# Patient Record
Sex: MALE | Race: WHITE | NOT HISPANIC OR LATINO | Employment: OTHER | URBAN - NONMETROPOLITAN AREA
[De-identification: names, ages, dates, MRNs, and addresses within clinical notes are randomized per-mention and may not be internally consistent; named-entity substitution may affect disease eponyms.]

---

## 2020-06-27 ENCOUNTER — APPOINTMENT (EMERGENCY)
Dept: RADIOLOGY | Facility: HOSPITAL | Age: 80
DRG: 391 | End: 2020-06-27
Payer: MEDICARE

## 2020-06-27 ENCOUNTER — HOSPITAL ENCOUNTER (INPATIENT)
Facility: HOSPITAL | Age: 80
LOS: 12 days | Discharge: NURSING, REHAB OR LONG-TERM CARE | DRG: 391 | End: 2020-07-09
Attending: EMERGENCY MEDICINE | Admitting: FAMILY MEDICINE
Payer: MEDICARE

## 2020-06-27 DIAGNOSIS — E86.0 DEHYDRATION: Primary | ICD-10-CM

## 2020-06-27 DIAGNOSIS — T14.8XXA DEEP TISSUE INJURY: ICD-10-CM

## 2020-06-27 DIAGNOSIS — I42.9 CARDIOMYOPATHY, UNSPECIFIED TYPE (HCC): ICD-10-CM

## 2020-06-27 DIAGNOSIS — L89.301 PRESSURE INJURY OF BUTTOCK, STAGE 1, UNSPECIFIED LATERALITY: ICD-10-CM

## 2020-06-27 DIAGNOSIS — K59.00 CONSTIPATION, UNSPECIFIED CONSTIPATION TYPE: ICD-10-CM

## 2020-06-27 DIAGNOSIS — R62.7 FAILURE TO THRIVE IN ADULT: ICD-10-CM

## 2020-06-27 DIAGNOSIS — R45.851 SUICIDAL IDEATION: ICD-10-CM

## 2020-06-27 DIAGNOSIS — R63.0 ANOREXIA: ICD-10-CM

## 2020-06-27 DIAGNOSIS — E53.8 VITAMIN B12 DEFICIENCY: ICD-10-CM

## 2020-06-27 DIAGNOSIS — F41.9 ANXIETY: ICD-10-CM

## 2020-06-27 DIAGNOSIS — R13.12 OROPHARYNGEAL DYSPHAGIA: ICD-10-CM

## 2020-06-27 DIAGNOSIS — E43 PROTEIN-CALORIE MALNUTRITION, SEVERE (HCC): ICD-10-CM

## 2020-06-27 DIAGNOSIS — I51.3 LEFT VENTRICULAR APICAL THROMBUS: ICD-10-CM

## 2020-06-27 DIAGNOSIS — I48.21 PERMANENT ATRIAL FIBRILLATION (HCC): ICD-10-CM

## 2020-06-27 PROBLEM — R53.1 GENERALIZED WEAKNESS: Status: ACTIVE | Noted: 2020-06-27

## 2020-06-27 PROBLEM — F31.9 BIPOLAR 1 DISORDER, DEPRESSED (HCC): Status: ACTIVE | Noted: 2020-06-27

## 2020-06-27 PROBLEM — R53.81 PHYSICAL DECONDITIONING: Status: ACTIVE | Noted: 2020-06-27

## 2020-06-27 PROBLEM — R33.9 URINARY RETENTION: Status: ACTIVE | Noted: 2020-06-27

## 2020-06-27 PROBLEM — E11.40 TYPE 2 DIABETES MELLITUS WITH DIABETIC NEUROPATHY, WITHOUT LONG-TERM CURRENT USE OF INSULIN (HCC): Status: ACTIVE | Noted: 2020-06-27

## 2020-06-27 LAB
ALBUMIN SERPL BCP-MCNC: 3.3 G/DL (ref 3.5–5)
ALP SERPL-CCNC: 72 U/L (ref 46–116)
ALT SERPL W P-5'-P-CCNC: 14 U/L (ref 12–78)
ANION GAP SERPL CALCULATED.3IONS-SCNC: 2 MMOL/L (ref 4–13)
AST SERPL W P-5'-P-CCNC: 18 U/L (ref 5–45)
BASOPHILS # BLD AUTO: 0.01 THOUSANDS/ΜL (ref 0–0.1)
BASOPHILS NFR BLD AUTO: 0 % (ref 0–1)
BILIRUB SERPL-MCNC: 0.3 MG/DL (ref 0.2–1)
BILIRUB UR QL STRIP: NEGATIVE
BUN SERPL-MCNC: 22 MG/DL (ref 5–25)
CALCIUM SERPL-MCNC: 8.5 MG/DL (ref 8.3–10.1)
CHLORIDE SERPL-SCNC: 111 MMOL/L (ref 100–108)
CK SERPL-CCNC: 33 U/L (ref 39–308)
CLARITY UR: CLEAR
CO2 SERPL-SCNC: 31 MMOL/L (ref 21–32)
COLOR UR: YELLOW
CREAT SERPL-MCNC: 1.08 MG/DL (ref 0.6–1.3)
EOSINOPHIL # BLD AUTO: 0.02 THOUSAND/ΜL (ref 0–0.61)
EOSINOPHIL NFR BLD AUTO: 1 % (ref 0–6)
ERYTHROCYTE [DISTWIDTH] IN BLOOD BY AUTOMATED COUNT: 15.9 % (ref 11.6–15.1)
GFR SERPL CREATININE-BSD FRML MDRD: 65 ML/MIN/1.73SQ M
GLUCOSE SERPL-MCNC: 106 MG/DL (ref 65–140)
GLUCOSE SERPL-MCNC: 96 MG/DL (ref 65–140)
GLUCOSE UR STRIP-MCNC: NEGATIVE MG/DL
HCT VFR BLD AUTO: 37.3 % (ref 36.5–49.3)
HGB BLD-MCNC: 12.8 G/DL (ref 12–17)
HGB UR QL STRIP.AUTO: NEGATIVE
IMM GRANULOCYTES # BLD AUTO: 0.01 THOUSAND/UL (ref 0–0.2)
IMM GRANULOCYTES NFR BLD AUTO: 0 % (ref 0–2)
INR PPP: 1.84 (ref 0.84–1.19)
KETONES UR STRIP-MCNC: NEGATIVE MG/DL
LEUKOCYTE ESTERASE UR QL STRIP: NEGATIVE
LIPASE SERPL-CCNC: 128 U/L (ref 73–393)
LYMPHOCYTES # BLD AUTO: 0.89 THOUSANDS/ΜL (ref 0.6–4.47)
LYMPHOCYTES NFR BLD AUTO: 26 % (ref 14–44)
MAGNESIUM SERPL-MCNC: 1.9 MG/DL (ref 1.6–2.6)
MCH RBC QN AUTO: 32.3 PG (ref 26.8–34.3)
MCHC RBC AUTO-ENTMCNC: 34.3 G/DL (ref 31.4–37.4)
MCV RBC AUTO: 94 FL (ref 82–98)
MONOCYTES # BLD AUTO: 0.62 THOUSAND/ΜL (ref 0.17–1.22)
MONOCYTES NFR BLD AUTO: 18 % (ref 4–12)
NEUTROPHILS # BLD AUTO: 1.84 THOUSANDS/ΜL (ref 1.85–7.62)
NEUTS SEG NFR BLD AUTO: 55 % (ref 43–75)
NITRITE UR QL STRIP: NEGATIVE
NRBC BLD AUTO-RTO: 0 /100 WBCS
PH UR STRIP.AUTO: 7.5 [PH]
PLATELET # BLD AUTO: 111 THOUSANDS/UL (ref 149–390)
PMV BLD AUTO: 11.2 FL (ref 8.9–12.7)
POTASSIUM SERPL-SCNC: 4.9 MMOL/L (ref 3.5–5.3)
PROT SERPL-MCNC: 6.1 G/DL (ref 6.4–8.2)
PROT UR STRIP-MCNC: NEGATIVE MG/DL
PROTHROMBIN TIME: 21.4 SECONDS (ref 11.6–14.5)
RBC # BLD AUTO: 3.96 MILLION/UL (ref 3.88–5.62)
SODIUM SERPL-SCNC: 144 MMOL/L (ref 136–145)
SP GR UR STRIP.AUTO: 1.01 (ref 1–1.03)
TROPONIN I SERPL-MCNC: 0.04 NG/ML
TSH SERPL DL<=0.05 MIU/L-ACNC: 0.55 UIU/ML (ref 0.36–3.74)
UROBILINOGEN UR QL STRIP.AUTO: 0.2 E.U./DL
WBC # BLD AUTO: 3.39 THOUSAND/UL (ref 4.31–10.16)

## 2020-06-27 PROCEDURE — 94760 N-INVAS EAR/PLS OXIMETRY 1: CPT

## 2020-06-27 PROCEDURE — 82550 ASSAY OF CK (CPK): CPT | Performed by: EMERGENCY MEDICINE

## 2020-06-27 PROCEDURE — 36415 COLL VENOUS BLD VENIPUNCTURE: CPT | Performed by: EMERGENCY MEDICINE

## 2020-06-27 PROCEDURE — 96361 HYDRATE IV INFUSION ADD-ON: CPT

## 2020-06-27 PROCEDURE — 84443 ASSAY THYROID STIM HORMONE: CPT | Performed by: EMERGENCY MEDICINE

## 2020-06-27 PROCEDURE — 85610 PROTHROMBIN TIME: CPT | Performed by: EMERGENCY MEDICINE

## 2020-06-27 PROCEDURE — 83735 ASSAY OF MAGNESIUM: CPT | Performed by: EMERGENCY MEDICINE

## 2020-06-27 PROCEDURE — 93005 ELECTROCARDIOGRAM TRACING: CPT

## 2020-06-27 PROCEDURE — 84484 ASSAY OF TROPONIN QUANT: CPT | Performed by: EMERGENCY MEDICINE

## 2020-06-27 PROCEDURE — 82948 REAGENT STRIP/BLOOD GLUCOSE: CPT

## 2020-06-27 PROCEDURE — 80053 COMPREHEN METABOLIC PANEL: CPT | Performed by: EMERGENCY MEDICINE

## 2020-06-27 PROCEDURE — 99285 EMERGENCY DEPT VISIT HI MDM: CPT

## 2020-06-27 PROCEDURE — 99223 1ST HOSP IP/OBS HIGH 75: CPT | Performed by: FAMILY MEDICINE

## 2020-06-27 PROCEDURE — 96360 HYDRATION IV INFUSION INIT: CPT

## 2020-06-27 PROCEDURE — 71045 X-RAY EXAM CHEST 1 VIEW: CPT

## 2020-06-27 PROCEDURE — 83690 ASSAY OF LIPASE: CPT | Performed by: EMERGENCY MEDICINE

## 2020-06-27 PROCEDURE — 99285 EMERGENCY DEPT VISIT HI MDM: CPT | Performed by: EMERGENCY MEDICINE

## 2020-06-27 PROCEDURE — 85025 COMPLETE CBC W/AUTO DIFF WBC: CPT | Performed by: EMERGENCY MEDICINE

## 2020-06-27 PROCEDURE — 87081 CULTURE SCREEN ONLY: CPT | Performed by: FAMILY MEDICINE

## 2020-06-27 PROCEDURE — 81003 URINALYSIS AUTO W/O SCOPE: CPT | Performed by: EMERGENCY MEDICINE

## 2020-06-27 RX ORDER — MAGNESIUM HYDROXIDE/ALUMINUM HYDROXICE/SIMETHICONE 120; 1200; 1200 MG/30ML; MG/30ML; MG/30ML
30 SUSPENSION ORAL EVERY 6 HOURS PRN
Status: DISCONTINUED | OUTPATIENT
Start: 2020-06-27 | End: 2020-07-09 | Stop reason: HOSPADM

## 2020-06-27 RX ORDER — POTASSIUM CHLORIDE 750 MG/1
10 CAPSULE, EXTENDED RELEASE ORAL 2 TIMES DAILY
COMMUNITY
End: 2020-09-25 | Stop reason: HOSPADM

## 2020-06-27 RX ORDER — MIRTAZAPINE 15 MG/1
15 TABLET, FILM COATED ORAL
Status: DISCONTINUED | OUTPATIENT
Start: 2020-06-27 | End: 2020-07-08

## 2020-06-27 RX ORDER — METOPROLOL TARTRATE 50 MG/1
50 TABLET, FILM COATED ORAL EVERY MORNING
COMMUNITY
End: 2020-07-09 | Stop reason: HOSPADM

## 2020-06-27 RX ORDER — ERGOCALCIFEROL 1.25 MG/1
50000 CAPSULE ORAL WEEKLY
Status: DISCONTINUED | OUTPATIENT
Start: 2020-06-27 | End: 2020-07-09 | Stop reason: HOSPADM

## 2020-06-27 RX ORDER — ROPINIROLE 0.25 MG/1
0.5 TABLET, FILM COATED ORAL
Status: DISCONTINUED | OUTPATIENT
Start: 2020-06-27 | End: 2020-07-09 | Stop reason: HOSPADM

## 2020-06-27 RX ORDER — MIRTAZAPINE 15 MG/1
15 TABLET, FILM COATED ORAL
COMMUNITY
End: 2020-07-09 | Stop reason: HOSPADM

## 2020-06-27 RX ORDER — SODIUM CHLORIDE 9 MG/ML
3 INJECTION INTRAVENOUS
Status: DISCONTINUED | OUTPATIENT
Start: 2020-06-27 | End: 2020-07-09 | Stop reason: HOSPADM

## 2020-06-27 RX ORDER — SERTRALINE HYDROCHLORIDE 25 MG/1
25 TABLET, FILM COATED ORAL EVERY MORNING
Status: DISCONTINUED | OUTPATIENT
Start: 2020-06-28 | End: 2020-07-09 | Stop reason: HOSPADM

## 2020-06-27 RX ORDER — ATORVASTATIN CALCIUM 10 MG/1
10 TABLET, FILM COATED ORAL DAILY
COMMUNITY
End: 2020-09-25 | Stop reason: HOSPADM

## 2020-06-27 RX ORDER — QUETIAPINE FUMARATE 50 MG/1
50 TABLET, FILM COATED ORAL
COMMUNITY
End: 2020-09-25 | Stop reason: HOSPADM

## 2020-06-27 RX ORDER — ACETAMINOPHEN 325 MG/1
650 TABLET ORAL EVERY 6 HOURS PRN
COMMUNITY
End: 2020-09-25 | Stop reason: HOSPADM

## 2020-06-27 RX ORDER — SUCRALFATE 1 G/1
1 TABLET ORAL 2 TIMES DAILY
Status: DISCONTINUED | OUTPATIENT
Start: 2020-06-27 | End: 2020-07-09 | Stop reason: HOSPADM

## 2020-06-27 RX ORDER — POTASSIUM CHLORIDE 20MEQ/15ML
20 LIQUID (ML) ORAL DAILY
Status: DISCONTINUED | OUTPATIENT
Start: 2020-06-28 | End: 2020-07-09 | Stop reason: HOSPADM

## 2020-06-27 RX ORDER — ROPINIROLE 0.5 MG/1
0.5 TABLET, FILM COATED ORAL
COMMUNITY
End: 2020-09-25 | Stop reason: HOSPADM

## 2020-06-27 RX ORDER — SUCRALFATE 1 G/1
1 TABLET ORAL 2 TIMES DAILY
COMMUNITY
End: 2020-09-25 | Stop reason: HOSPADM

## 2020-06-27 RX ORDER — ACETAMINOPHEN 325 MG/1
650 TABLET ORAL EVERY 6 HOURS PRN
Status: DISCONTINUED | OUTPATIENT
Start: 2020-06-27 | End: 2020-07-09 | Stop reason: HOSPADM

## 2020-06-27 RX ORDER — LEVOTHYROXINE SODIUM 0.05 MG/1
50 TABLET ORAL DAILY
COMMUNITY
End: 2020-09-25 | Stop reason: HOSPADM

## 2020-06-27 RX ORDER — WARFARIN SODIUM 4 MG/1
4 TABLET ORAL
Status: DISCONTINUED | OUTPATIENT
Start: 2020-06-27 | End: 2020-07-02

## 2020-06-27 RX ORDER — QUETIAPINE FUMARATE 25 MG/1
50 TABLET, FILM COATED ORAL
Status: DISCONTINUED | OUTPATIENT
Start: 2020-06-27 | End: 2020-07-09 | Stop reason: HOSPADM

## 2020-06-27 RX ORDER — NITROGLYCERIN 0.4 MG/1
0.4 TABLET SUBLINGUAL
COMMUNITY
End: 2020-09-25 | Stop reason: HOSPADM

## 2020-06-27 RX ORDER — RAMIPRIL 10 MG/1
10 CAPSULE ORAL DAILY
COMMUNITY
End: 2020-09-25 | Stop reason: HOSPADM

## 2020-06-27 RX ORDER — GABAPENTIN 100 MG/1
100 CAPSULE ORAL
COMMUNITY
End: 2020-09-25 | Stop reason: HOSPADM

## 2020-06-27 RX ORDER — PANTOPRAZOLE SODIUM 40 MG/1
40 TABLET, DELAYED RELEASE ORAL DAILY
COMMUNITY
End: 2020-09-25 | Stop reason: HOSPADM

## 2020-06-27 RX ORDER — DOCUSATE SODIUM 100 MG/1
100 CAPSULE, LIQUID FILLED ORAL 2 TIMES DAILY
Status: DISCONTINUED | OUTPATIENT
Start: 2020-06-27 | End: 2020-07-09 | Stop reason: HOSPADM

## 2020-06-27 RX ORDER — GABAPENTIN 100 MG/1
100 CAPSULE ORAL
Status: DISCONTINUED | OUTPATIENT
Start: 2020-06-27 | End: 2020-07-09 | Stop reason: HOSPADM

## 2020-06-27 RX ORDER — TAMSULOSIN HYDROCHLORIDE 0.4 MG/1
0.4 CAPSULE ORAL
Status: DISCONTINUED | OUTPATIENT
Start: 2020-06-27 | End: 2020-07-09 | Stop reason: HOSPADM

## 2020-06-27 RX ORDER — PANTOPRAZOLE SODIUM 40 MG/1
40 TABLET, DELAYED RELEASE ORAL DAILY
Status: DISCONTINUED | OUTPATIENT
Start: 2020-06-28 | End: 2020-07-09 | Stop reason: HOSPADM

## 2020-06-27 RX ORDER — ATORVASTATIN CALCIUM 10 MG/1
10 TABLET, FILM COATED ORAL DAILY
Status: DISCONTINUED | OUTPATIENT
Start: 2020-06-28 | End: 2020-07-09 | Stop reason: HOSPADM

## 2020-06-27 RX ORDER — ALPRAZOLAM 0.25 MG/1
TABLET ORAL 2 TIMES DAILY PRN
COMMUNITY
End: 2020-07-09 | Stop reason: HOSPADM

## 2020-06-27 RX ORDER — ALPRAZOLAM 0.25 MG/1
0.25 TABLET ORAL 2 TIMES DAILY PRN
Status: DISCONTINUED | OUTPATIENT
Start: 2020-06-27 | End: 2020-07-01

## 2020-06-27 RX ORDER — ERGOCALCIFEROL 1.25 MG/1
50000 CAPSULE ORAL WEEKLY
COMMUNITY
End: 2020-09-25 | Stop reason: HOSPADM

## 2020-06-27 RX ORDER — METOPROLOL TARTRATE 50 MG/1
50 TABLET, FILM COATED ORAL EVERY MORNING
Status: DISCONTINUED | OUTPATIENT
Start: 2020-06-28 | End: 2020-06-27

## 2020-06-27 RX ORDER — LEVOTHYROXINE SODIUM 0.05 MG/1
50 TABLET ORAL DAILY
Status: DISCONTINUED | OUTPATIENT
Start: 2020-06-28 | End: 2020-07-09 | Stop reason: HOSPADM

## 2020-06-27 RX ORDER — TAMSULOSIN HYDROCHLORIDE 0.4 MG/1
0.4 CAPSULE ORAL
COMMUNITY

## 2020-06-27 RX ORDER — ASPIRIN 81 MG/1
81 TABLET ORAL DAILY
Status: DISCONTINUED | OUTPATIENT
Start: 2020-06-28 | End: 2020-07-09 | Stop reason: HOSPADM

## 2020-06-27 RX ORDER — WARFARIN SODIUM 3 MG/1
TABLET ORAL
COMMUNITY
End: 2020-07-09 | Stop reason: HOSPADM

## 2020-06-27 RX ORDER — ONDANSETRON 2 MG/ML
4 INJECTION INTRAMUSCULAR; INTRAVENOUS EVERY 6 HOURS PRN
Status: DISCONTINUED | OUTPATIENT
Start: 2020-06-27 | End: 2020-07-09 | Stop reason: HOSPADM

## 2020-06-27 RX ORDER — HYDRALAZINE HYDROCHLORIDE 20 MG/ML
10 INJECTION INTRAMUSCULAR; INTRAVENOUS ONCE
Status: DISCONTINUED | OUTPATIENT
Start: 2020-06-27 | End: 2020-06-30

## 2020-06-27 RX ORDER — SERTRALINE HYDROCHLORIDE 25 MG/1
25 TABLET, FILM COATED ORAL EVERY MORNING
COMMUNITY
End: 2020-09-25 | Stop reason: HOSPADM

## 2020-06-27 RX ORDER — NITROGLYCERIN 0.4 MG/1
0.4 TABLET SUBLINGUAL
Status: DISCONTINUED | OUTPATIENT
Start: 2020-06-27 | End: 2020-07-09 | Stop reason: HOSPADM

## 2020-06-27 RX ORDER — ASPIRIN 81 MG/1
81 TABLET ORAL DAILY
COMMUNITY
End: 2020-09-25 | Stop reason: HOSPADM

## 2020-06-27 RX ORDER — SODIUM CHLORIDE 9 MG/ML
75 INJECTION, SOLUTION INTRAVENOUS CONTINUOUS
Status: DISCONTINUED | OUTPATIENT
Start: 2020-06-27 | End: 2020-06-28

## 2020-06-27 RX ORDER — LISINOPRIL 10 MG/1
10 TABLET ORAL DAILY
Status: DISCONTINUED | OUTPATIENT
Start: 2020-06-28 | End: 2020-07-01

## 2020-06-27 RX ADMIN — ROPINIROLE HYDROCHLORIDE 0.5 MG: 0.25 TABLET, FILM COATED ORAL at 21:45

## 2020-06-27 RX ADMIN — QUETIAPINE FUMARATE 50 MG: 25 TABLET ORAL at 21:45

## 2020-06-27 RX ADMIN — SUCRALFATE 1 G: 1 TABLET ORAL at 18:37

## 2020-06-27 RX ADMIN — ERGOCALCIFEROL 50000 UNITS: 1.25 CAPSULE ORAL at 18:38

## 2020-06-27 RX ADMIN — SODIUM CHLORIDE 75 ML/HR: 0.9 INJECTION, SOLUTION INTRAVENOUS at 18:34

## 2020-06-27 RX ADMIN — WARFARIN SODIUM 4 MG: 4 TABLET ORAL at 18:38

## 2020-06-27 RX ADMIN — GABAPENTIN 100 MG: 100 CAPSULE ORAL at 21:47

## 2020-06-27 RX ADMIN — SODIUM CHLORIDE 500 ML: 0.9 INJECTION, SOLUTION INTRAVENOUS at 18:34

## 2020-06-27 RX ADMIN — MIRTAZAPINE 15 MG: 15 TABLET, FILM COATED ORAL at 21:45

## 2020-06-27 RX ADMIN — TAMSULOSIN HYDROCHLORIDE 0.4 MG: 0.4 CAPSULE ORAL at 18:38

## 2020-06-27 RX ADMIN — DOCUSATE SODIUM 100 MG: 100 CAPSULE, LIQUID FILLED ORAL at 18:38

## 2020-06-27 RX ADMIN — SODIUM CHLORIDE 1000 ML: 0.9 INJECTION, SOLUTION INTRAVENOUS at 13:37

## 2020-06-27 NOTE — ED NOTES
Attempted to contact floor to notify pt transport to floor, no answer by telephone      Laila Barbosa, PAULINA  06/27/20 R Kwabena Weinstein, PAULINA  06/27/20 [id number]

## 2020-06-27 NOTE — ASSESSMENT & PLAN NOTE
Rate control  Continue metoprolol  Continue Coumadin-patient was taking 3 mg, will increase the dose to 4 mg  INR is 1 84

## 2020-06-27 NOTE — H&P
H&P- Adelina Prasad 1940, 68 y o  male MRN: [id number]    Unit/Bed#: -01 Encounter: [id number]    Primary Care Provider: Dilma Jackson MD   Date and time admitted to hospital: 6/27/2020 12:53 PM        Permanent atrial fibrillation ([place] [address] )  Assessment & Plan  Rate control  Continue metoprolol  Continue Coumadin-patient was taking 3 mg, will increase the dose to 4 mg  INR is 1 84      * Oropharyngeal dysphagia  Assessment & Plan  Unknown etiology  Follow dysphagia diet  Follow aspiration precaution  Will consider GI consult    Cardiomyopathy [facility])  Assessment & Plan  As per chart review  Status post pacemaker placement  Continue home medication    Bipolar 1 disorder, depressed ([place] [address] )  Assessment & Plan  Continue home medication      Type 2 diabetes mellitus with diabetic neuropathy, without long-term current use of insulin ([place] [address] )  Assessment & Plan  No results found for: HGBA1C    No results for input(s): POCGLU in the last 72 hours  Blood Sugar Average: Last 72 hrs:   sliding scale  Continue gabapentin      Urinary retention  Assessment & Plan  History of urinary retention  Follow urinary retention protocol    Anxiety  Assessment & Plan  Continue home medication  Monitoring mental status    Generalized weakness  Assessment & Plan  Secondary to multiple factors  Continue nutritional supplement  Continue done  Will bolus 1 time  Monitor vital  Follow PT OT, speech evaluation    Failure to thrive in adult  Assessment & Plan  TSH is normal  Continue nutritional supplement  Continue diet  Follow nutritional consult  Follow speech and swallow  Follow-up PT OT recommendation        VTE Prophylaxis: Warfarin (Coumadin)  / sequential compression device   Code Status:  DNR DNI  POLST: There is no POLST form on file for this patient (pre-hospital)  Discussion with family:  None    Anticipated Length of Stay:  Patient will be admitted on an Inpatient basis with an anticipated length of stay of  > 2 midnights  Justification for Hospital Stay:  Above condition    Total Time for Visit, including Counseling / Coordination of Care: 45 minutes  Greater than 50% of this total time spent on direct patient counseling and coordination of care  Chief Complaint:   Failure to thrive, unable to eat    History of Present Illness:    Nayeli Moralez is a 68 y o  male who presents with failure to thrive, difficulty swallowing  Patient is from [facility]  Patient was seen by his primary care in nursing home and found the patient was having dehydration, anxiety and dysphagia for that reason patient was sent to the hospital   Patient reports he is unable to eat something whenever he tried to eat it seems something stuck in the throat  Denies any chest pain, nausea, vomiting, diarrhea  Patient also reports he thinks he is going to die and this thinking process is coming back and back in his mind  But denies any suicidal ideation  Denies any hallucination, any auditory or visual hallucination  Review of Systems:    Review of Systems   Constitutional: Positive for activity change, appetite change, fatigue and unexpected weight change  Negative for chills, diaphoresis and fever  HENT: Positive for dental problem and trouble swallowing  Negative for congestion, drooling, hearing loss and voice change  Eyes: Negative for pain, discharge and itching  Respiratory: Negative for apnea, chest tightness, shortness of breath and stridor  Cardiovascular: Negative for chest pain, palpitations and leg swelling  Gastrointestinal: Negative for abdominal distention, abdominal pain, anal bleeding, diarrhea and nausea  Difficulty in swallowing   Genitourinary: Negative for dysuria and enuresis  Musculoskeletal: Negative for arthralgias, back pain and gait problem  Skin: Negative for color change and pallor  Neurological: Negative for dizziness, facial asymmetry, light-headedness and headaches     Hematological: Negative for adenopathy  Psychiatric/Behavioral: Negative for agitation  All other systems reviewed and are negative  Past Medical and Surgical History:     Past Medical History:   Diagnosis Date    A-fib ([place] [address] )     Anemia     Bipolar 1 disorder ([place] [address] )     Cardiomegaly     Depression     Diabetes mellitus ([place] [address] )     Disease of thyroid gland     Epigastric pain     MI (myocardial infarction) ([place] [address] )     Panic disorder (episodic paroxysmal anxiety)     [facility]     Urinary retention     Vertigo        History reviewed  No pertinent surgical history  Meds/Allergies:    Prior to Admission medications    Medication Sig Start Date End Date Taking?  Authorizing Provider   acetaminophen (TYLENOL) 325 mg tablet Take 650 mg by mouth every 6 (six) hours as needed for mild pain   Yes Historical Provider, MD   ALPRAZolam (XANAX) 0 25 mg tablet Take by mouth 2 (two) times a day as needed for anxiety   Yes Historical Provider, MD   aspirin (ECOTRIN LOW STRENGTH) 81 mg EC tablet Take 81 mg by mouth daily   Yes Historical Provider, MD   atorvastatin (LIPITOR) 10 mg tablet Take 10 mg by mouth daily   Yes Historical Provider, MD   ergocalciferol (VITAMIN D2) 50,000 units Take 50,000 Units by mouth once a week TUESDAY   Yes Historical Provider, MD   gabapentin (NEURONTIN) 100 mg capsule Take 100 mg by mouth daily at bedtime   Yes Historical Provider, MD   levothyroxine 50 mcg tablet Take 50 mcg by mouth daily   Yes Historical Provider, MD   metoprolol tartrate (LOPRESSOR) 50 mg tablet Take 50 mg by mouth every morning   Yes Historical Provider, MD   mirtazapine (REMERON) 15 mg tablet Take 15 mg by mouth daily at bedtime   Yes Historical Provider, MD   nitroglycerin (NITROSTAT) 0 4 mg SL tablet Place 0 4 mg under the tongue every 5 (five) minutes as needed for chest pain   Yes Historical Provider, MD   pantoprazole (PROTONIX) 40 mg tablet Take 40 mg by mouth daily   Yes Historical Provider, MD   potassium chloride (MICRO-K) 10 MEQ CR capsule Take 10 mEq by mouth 2 (two) times a day   Yes Historical Provider, MD   QUEtiapine (SEROquel) 50 mg tablet Take 50 mg by mouth daily at bedtime   Yes Historical Provider, MD   ramipril (ALTACE) 10 MG capsule Take 10 mg by mouth daily   Yes Historical Provider, MD   rOPINIRole (REQUIP) 0 5 mg tablet Take 0 5 mg by mouth daily at bedtime   Yes Historical Provider, MD   sertraline (ZOLOFT) 25 mg tablet Take 25 mg by mouth every morning   Yes Historical Provider, MD   sucralfate (CARAFATE) 1 g tablet Take 1 g by mouth 2 (two) times a day   Yes Historical Provider, MD   tamsulosin (FLOMAX) 0 4 mg Take 0 4 mg by mouth daily with dinner   Yes Historical Provider, MD   warfarin (COUMADIN) 3 mg tablet Take by mouth daily   Yes Historical Provider, MD     I have reviewed home medications with patient personally  Allergies: No Known Allergies    Social History:     Marital Status: Single   Occupation:  Unknown  Patient Pre-hospital Living Situation: In nursing  Patient Pre-hospital Level of Mobility:  With assistance  Patient Pre-hospital Diet Restrictions:  Dysphagia diet  Substance Use History:   Social History     Substance and Sexual Activity   Alcohol Use Never    Frequency: Never    Binge frequency: Never     Social History     Tobacco Use   Smoking Status Never Smoker   Smokeless Tobacco Never Used     Social History     Substance and Sexual Activity   Drug Use Never       Family History:    non-contributory    Physical Exam:     Vitals:   Blood Pressure: 139/68 (06/27/20 1720)  Pulse: (!) 50 (06/27/20 1720)  Temperature: 98 1 °F (36 7 °C) (06/27/20 1720)  Respirations: 16 (06/27/20 1720)  Height: 5' 10" (177 8 cm) (06/27/20 1630)  Weight - Scale: 54 7 kg (120 lb 9 5 oz) (06/27/20 1254)  SpO2: 99 % (06/27/20 1720)    Physical Exam   Constitutional: He is oriented to person, place, and time  He appears cachectic     Patient is malnourished and very frail HENT:   Head: Atraumatic  Mouth/Throat: Oropharynx is clear and moist  No oropharyngeal exudate  Eyes: Pupils are equal, round, and reactive to light  Conjunctivae and EOM are normal  No scleral icterus  Neck: Normal range of motion  Neck supple  No JVD present  Cardiovascular: S2 normal and normal heart sounds  Bradycardia present  Pulmonary/Chest: Effort normal and breath sounds normal  No stridor  He has no wheezes  Abdominal: Soft  Bowel sounds are normal  He exhibits no distension and no mass  There is no tenderness  There is no guarding  Musculoskeletal: He exhibits no edema  Neurological: He is alert and oriented to person, place, and time  He displays normal reflexes  No cranial nerve deficit  He exhibits normal muscle tone  Coordination normal    Skin: Skin is warm  Capillary refill takes less than 2 seconds  Psychiatric: He is withdrawn  He exhibits a depressed mood  Nursing note and vitals reviewed  Additional Data:     Lab Results: I have personally reviewed pertinent reports  Results from last 7 days   Lab Units 06/27/20  1324   WBC Thousand/uL 3 39*   HEMOGLOBIN g/dL 12 8   HEMATOCRIT % 37 3   PLATELETS Thousands/uL 111*   NEUTROS PCT % 55   LYMPHS PCT % 26   MONOS PCT % 18*   EOS PCT % 1     Results from last 7 days   Lab Units 06/27/20  1520   SODIUM mmol/L 144   POTASSIUM mmol/L 4 9   CHLORIDE mmol/L 111*   CO2 mmol/L 31   BUN mg/dL 22   CREATININE mg/dL 1 08   ANION GAP mmol/L 2*   CALCIUM mg/dL 8 5   ALBUMIN g/dL 3 3*   TOTAL BILIRUBIN mg/dL 0 30   ALK PHOS U/L 72   ALT U/L 14   AST U/L 18   GLUCOSE RANDOM mg/dL 96     Results from last 7 days   Lab Units 06/27/20  1520   INR  1 84*                   Imaging: I have personally reviewed pertinent reports  X-ray chest 1 view portable   Final Result by Arty Dandy, MD (06/27 1027)      No acute cardiopulmonary disease              Workstation performed: [id number]             EKG, Pathology, and Other Studies Reviewed on Admission:   · EKG:  Reviewed    Allscripts / Epic Records Reviewed: Yes     ** Please Note: This note has been constructed using a voice recognition system   **

## 2020-06-27 NOTE — RESPIRATORY THERAPY NOTE
RT Protocol Note  Carlito Painter 68 y o  male MRN: [id number]  Unit/Bed#: -01 Encounter: [id number]    Assessment    Principal Problem:    Oropharyngeal dysphagia  Active Problems:    Failure to thrive in adult    Generalized weakness    Permanent atrial fibrillation (HCC)    Anxiety    Urinary retention    Type 2 diabetes mellitus with diabetic neuropathy, without long-term current use of insulin (HCC)    Bipolar 1 disorder, depressed ([facility])    Cardiomyopathy ([facility])    Physical deconditioning      Home Pulmonary Medications:  None       Past Medical History:   Diagnosis Date    A-fib ([place] [address] )     Anemia     Bipolar 1 disorder ([place] [address] )     Cardiomegaly     Depression     Diabetes mellitus ([place] [address] )     Disease of thyroid gland     Epigastric pain     MI (myocardial infarction) ([place] [address] )     Panic disorder (episodic paroxysmal anxiety)     SV[facility]ra[facility] tachycardia) ([facility])     Urinary retention     Vertigo      Social History     Socioeconomic History    Marital status: Single     Spouse name: None    Number of children: None    Years of education: None    Highest education level: None   Occupational History    None   Social Needs    Financial resource strain: None    Food insecurity:     Worry: None     Inability: None    Transportation needs:     Medical: None     Non-medical: None   Tobacco Use    Smoking status: Never Smoker    Smokeless tobacco: Never Used   Substance and Sexual Activity    Alcohol use: Never     Frequency: Never     Binge frequency: Never    Drug use: Never    Sexual activity: Not Currently   Lifestyle    Physical activity:     Days per week: None     Minutes per session: None    Stress: None   Relationships    Social connections:     Talks on phone: None     Gets together: None     Attends Scientologist service: None     Active member of club or organization: None     Attends meetings of clubs or organizations: None     Relationship status: None    Intimate partner violence:     Fear of current or ex partner: None     Emotionally abused: None     Physically abused: None     Forced sexual activity: None   Other Topics Concern    None   Social History Narrative    None       Subjective         Objective    Physical Exam:   Assessment Type: Assess only  General Appearance: Alert, Awake  Respiratory Pattern: Normal  Chest Assessment: Chest expansion symmetrical  Bilateral Breath Sounds: Diminished  Cough: None  O2 Device: RA    Vitals:  Blood pressure 139/68, pulse (!) 50, temperature 98 1 °F (36 7 °C), resp  rate 16, height 5' 10" (1 778 m), weight 54 7 kg (120 lb 9 5 oz), SpO2 99 %  Imaging and other studies: I have personally reviewed pertinent reports  O2 Device: RA     Plan    Respiratory Plan: Discontinue Protocol        Resp Comments: Pt here for dysphagia, no respiratory distress, no SOB, lungs are CTA B/L and CXR shows clear lungs  Pt never a smoker, does not take any respiratory meds at home  Protocol D/C at this time

## 2020-06-27 NOTE — ASSESSMENT & PLAN NOTE
TSH is normal  Continue nutritional supplement  Continue diet  Follow nutritional consult  Follow speech and swallow  Follow-up PT OT recommendation

## 2020-06-27 NOTE — ASSESSMENT & PLAN NOTE
Secondary to multiple factors  Continue nutritional supplement  Continue done  Will bolus 1 time  Monitor vital  Follow PT OT, speech evaluation

## 2020-06-27 NOTE — ED NOTES
Attempted to contact floor to notify pt is being transported to floor and requires one to one observation, no answer on floor      Peter Smith RN  06/27/20 [address] Minor Santos  06/27/20 3881

## 2020-06-27 NOTE — ED PROVIDER NOTES
History  Chief Complaint   Patient presents with    Dehydration     Patient sent from [address] [facility] because patient is anxious and not eating PCP wanted him evaluated for dehydration  77-year-old male with a past medical history of anemia, panic disorder, bipolar disorder, thyroid disease, vertigo, atrial fibrillation, SVT, urinary retention, myocardial infarction, depression, diabetes, cardiomegaly presents from [facility] with anorexia and dehydration  Patient is alert and oriented and appears very anxious and is stating that he is anxious about eating food because of the big parts and the little parts  Patient denies nausea or abdominal pain  Patient is on warfarin  Review of hospital transfer she states that patient is not eating and has increased anxiety  Physician is concerned for dehydration  Patient states that food does not taste good but denies loss of sense of smell or taste  Patient denies difficulty swallowing  Denies headache, neck pain, neck stiffness, fever, chills, nausea, vomiting, chest pain, shortness of breath, rash, back pain, urinary symptoms or diarrhea  Patient also states he is worried about changing his own depends because he might urinate on himself  Patient also admits that he has been having thoughts of dying and wishing that he was dead  Denies specific plan  Patient denies previous history of suicidal ideation  Patient denies homicidal ideation or hallucinations  Patient is a DNR        History provided by:  Nursing home and patient   used: No    Malaise - 7 years or greater   Severity:  Mild  Onset quality:  Unable to specify  Timing:  Unable to specify  Progression:  Unchanged  Context: dehydration    Context: not alcohol use, not allergies, not change in medication, not decreased sleep, not drug use, not increased activity, not pinched nerve, not recent infection, not stress and not urinary tract infection    Relieved by:  Nothing  Worsened by:  Nothing  Ineffective treatments:  None tried  Associated symptoms: anorexia    Associated symptoms: no abdominal pain, no aphasia, no arthralgias, no ataxia, no chest pain, no cough, no diarrhea, no dizziness, no drooling, no dysuria, no numbness in extremities, no falls, no fever, no foul-smelling urine, no frequency, no headaches, no hematochezia, no myalgias, no nausea, no near-syncope, no seizures, no sensory-motor deficit, no shortness of breath, no stroke symptoms, no syncope, no urgency and no vomiting        None       Past Medical History:   Diagnosis Date    A-fib ([place] [address] )     Anemia     Bipolar 1 disorder ([place] [address] )     Cardiomegaly     Depression     Diabetes mellitus ([place] [address] )     Disease of thyroid gland     Epigastric pain     MI (myocardial infarction) ([place] [address] )     Panic disorder (episodic paroxysmal anxiety)     [facility]     Urinary retention     Vertigo        History reviewed  No pertinent surgical history  History reviewed  No pertinent family history  I have reviewed and agree with the history as documented  E-Cigarette/Vaping    E-Cigarette Use Never User      E-Cigarette/Vaping Substances     Social History     Tobacco Use    Smoking status: Never Smoker    Smokeless tobacco: Never Used   Substance Use Topics    Alcohol use: Never     Frequency: Never    Drug use: Never       Review of Systems   Constitutional: Positive for appetite change  Negative for chills, diaphoresis, fatigue, fever and unexpected weight change  HENT: Negative for congestion, drooling, facial swelling, nosebleeds, postnasal drip, rhinorrhea, sinus pressure, sinus pain, sneezing, sore throat, tinnitus, trouble swallowing and voice change  Eyes: Negative for photophobia, pain and visual disturbance  Respiratory: Negative for cough, chest tightness, shortness of breath and wheezing      Cardiovascular: Negative for chest pain, palpitations, leg swelling, syncope and near-syncope  Gastrointestinal: Positive for anorexia  Negative for abdominal pain, blood in stool, constipation, diarrhea, hematochezia, nausea and vomiting  Genitourinary: Negative for decreased urine volume, difficulty urinating, discharge, dysuria, flank pain, frequency, hematuria, penile pain, penile swelling, scrotal swelling, testicular pain and urgency  Musculoskeletal: Negative for arthralgias, back pain, falls, joint swelling, myalgias, neck pain and neck stiffness  Skin: Negative for color change, pallor, rash and wound  Allergic/Immunologic: Negative for immunocompromised state  Neurological: Negative for dizziness, tremors, seizures, syncope, facial asymmetry, speech difficulty, weakness, light-headedness, numbness and headaches  Hematological: Negative for adenopathy  Psychiatric/Behavioral: Positive for suicidal ideas  Negative for agitation, behavioral problems, confusion, decreased concentration, dysphoric mood, hallucinations, self-injury and sleep disturbance  The patient is nervous/anxious  The patient is not hyperactive  Physical Exam  Physical Exam   Constitutional: He is oriented to person, place, and time  He appears well-developed and well-nourished  He appears cachectic  He is cooperative  Non-toxic appearance  He does not have a sickly appearance  He appears ill (Chronically ill-appearing, frail and elderly)  No distress  HENT:   Head: Normocephalic and atraumatic  Right Ear: Hearing, tympanic membrane, external ear and ear canal normal    Left Ear: Hearing, tympanic membrane, external ear and ear canal normal    Nose: Nose normal  Right sinus exhibits no maxillary sinus tenderness and no frontal sinus tenderness  Left sinus exhibits no maxillary sinus tenderness and no frontal sinus tenderness     Mouth/Throat: Uvula is midline, oropharynx is clear and moist and mucous membranes are normal  No oropharyngeal exudate, posterior oropharyngeal edema, posterior oropharyngeal erythema or tonsillar abscesses  Eyes: Pupils are equal, round, and reactive to light  Conjunctivae, EOM and lids are normal    Neck: Trachea normal, normal range of motion, full passive range of motion without pain and phonation normal  Neck supple  No thyroid mass and no thyromegaly present  Cardiovascular: Normal rate, regular rhythm, S1 normal, S2 normal, normal heart sounds, intact distal pulses and normal pulses  Pulses:       Radial pulses are 2+ on the right side, and 2+ on the left side  Dorsalis pedis pulses are 2+ on the right side, and 2+ on the left side  Pulmonary/Chest: Effort normal and breath sounds normal  No accessory muscle usage or stridor  No tachypnea  No respiratory distress  He has no decreased breath sounds  He has no wheezes  He has no rhonchi  He has no rales  He exhibits no mass, no tenderness, no deformity and no retraction  Abdominal: Soft  Bowel sounds are normal  He exhibits no distension, no ascites and no mass  There is no hepatosplenomegaly  There is no tenderness  There is no rigidity, no rebound, no guarding, no CVA tenderness, no tenderness at McBurney's point and negative Braswell's sign  No hernia  Genitourinary: Penis normal  No penile tenderness  Musculoskeletal: Normal range of motion  He exhibits no edema, tenderness or deformity  Lymphadenopathy:     He has no cervical adenopathy  Neurological: He is alert and oriented to person, place, and time  He has normal strength  He is not disoriented  He displays no atrophy and no tremor  No cranial nerve deficit or sensory deficit  He exhibits normal muscle tone  He displays a negative Romberg sign  He displays no seizure activity  Coordination and gait normal  GCS eye subscore is 4  GCS verbal subscore is 5  GCS motor subscore is 6     Patient is AAOx4, GCS 15; speaking clearly and appropriately; motor and sensation intact; visual fields intact; cranial nerves II-XII grossly intact; no facial droop, slurred speech or arm drift   Skin: Skin is warm, dry and intact  Capillary refill takes less than 2 seconds  No abrasion, no bruising, no burn, no ecchymosis, no laceration, no lesion, no petechiae and no rash noted  Rash is not maculopapular  He is not diaphoretic  No erythema  There is pallor  Psychiatric: His speech is normal  His mood appears anxious  He is slowed and withdrawn  He is not actively hallucinating  Thought content is not paranoid and not delusional  Cognition and memory are normal  He expresses inappropriate judgment  He expresses suicidal ideation  He expresses no homicidal ideation  He expresses no suicidal plans and no homicidal plans  He is attentive  Nursing note and vitals reviewed  Vital Signs  ED Triage Vitals [06/27/20 1254]   Temperature Pulse Respirations Blood Pressure SpO2   (!) 97 1 °F (36 2 °C) 67 20 (!) 174/84 98 %      Temp src Heart Rate Source Patient Position - Orthostatic VS BP Location FiO2 (%)   -- Monitor Lying Left arm --      Pain Score       No Pain           Vitals:    06/27/20 1254 06/27/20 1521 06/27/20 1630   BP: (!) 174/84 134/79 144/78   Pulse: 67 (!) 49 (!) 54   Patient Position - Orthostatic VS: Lying Lying Lying         Visual Acuity      ED Medications  Medications   sodium chloride (PF) 0 9 % injection 3 mL (has no administration in time range)   hydrALAZINE (APRESOLINE) injection 10 mg (0 mg Intravenous Hold 6/27/20 1528)   sodium chloride 0 9 % bolus 1,000 mL (0 mL Intravenous Stopped 6/27/20 1519)       Diagnostic Studies  Results Reviewed     Procedure Component Value Units Date/Time    TSH, 3rd generation [[id number]]  (Normal) Collected:  06/27/20 1520    Lab Status:  Final result Specimen:  Blood from Arm, Right Updated:  06/27/20 1558     TSH 3RD GENERATON 0 552 uIU/mL     Narrative:       Patients undergoing fluorescein dye angiography may retain small amounts of fluorescein in the body for 48-72 hours post procedure  Samples containing fluorescein can produce falsely depressed TSH values  If the patient had this procedure,a specimen should be resubmitted post fluorescein clearance        Comprehensive metabolic panel [[id number]]  (Abnormal) Collected:  06/27/20 1520    Lab Status:  Final result Specimen:  Blood from Arm, Right Updated:  06/27/20 1547     Sodium 144 mmol/L      Potassium 4 9 mmol/L      Chloride 111 mmol/L      CO2 31 mmol/L      ANION GAP 2 mmol/L      BUN 22 mg/dL      Creatinine 1 08 mg/dL      Glucose 96 mg/dL      Calcium 8 5 mg/dL      AST 18 U/L      ALT 14 U/L      Alkaline Phosphatase 72 U/L      Total Protein 6 1 g/dL      Albumin 3 3 g/dL      Total Bilirubin 0 30 mg/dL      eGFR 65 ml/min/1 73sq m     Narrative:       [place] guidelines for Chronic Kidney Disease (CKD):     Stage 1 with normal or high GFR (GFR > 90 mL/min/1 73 square meters)    Stage 2 Mild CKD (GFR = 60-89 mL/min/1 73 square meters)    Stage 3A Moderate CKD (GFR = 45-59 mL/min/1 73 square meters)    Stage 3B Moderate CKD (GFR = 30-44 mL/min/1 73 square meters)    Stage 4 Severe CKD (GFR = 15-29 mL/min/1 73 square meters)    Stage 5 End Stage CKD (GFR <15 mL/min/1 73 square meters)  Note: GFR calculation is accurate only with a steady state creatinine    Lipase [[id number]]  (Normal) Collected:  06/27/20 1520    Lab Status:  Final result Specimen:  Blood from Arm, Right Updated:  06/27/20 1547     Lipase 128 u/L     Magnesium [[id number]]  (Normal) Collected:  06/27/20 1520    Lab Status:  Final result Specimen:  Blood from Arm, Right Updated:  06/27/20 1547     Magnesium 1 9 mg/dL     CK (with reflex to MB) [[id number]]  (Abnormal) Collected:  06/27/20 1520    Lab Status:  Final result Specimen:  Blood from Arm, Right Updated:  06/27/20 1547     Total CK 33 U/L     Protime-INR [[id number]]  (Abnormal) Collected:  06/27/20 1520    Lab Status:  Final result Specimen:  Blood from Arm, Right Updated:  06/27/20 1546     Protime 21 4 seconds      INR 1 84    UA w Reflex to Microscopic w Reflex to Culture [[id number]] Collected:  06/27/20 1520    Lab Status:  Final result Specimen:  Urine, Clean Catch Updated:  06/27/20 1531     Color, UA Yellow     Clarity, UA Clear     Specific Gravity, UA 1 015     pH, UA 7 5     Leukocytes, UA Negative     Nitrite, UA Negative     Protein, UA Negative mg/dl      Glucose, UA Negative mg/dl      Ketones, UA Negative mg/dl      Urobilinogen, UA 0 2 E U /dl      Bilirubin, UA Negative     Blood, UA Negative    Troponin I [[id number]]  (Normal) Collected:  06/27/20 1324    Lab Status:  Final result Specimen:  Blood from Arm, Right Updated:  06/27/20 1457     Troponin I 0 04 ng/mL     CBC and differential [[id number]]  (Abnormal) Collected:  06/27/20 1324    Lab Status:  Final result Specimen:  Blood from Arm, Right Updated:  06/27/20 1437     WBC 3 39 Thousand/uL      RBC 3 96 Million/uL      Hemoglobin 12 8 g/dL      Hematocrit 37 3 %      MCV 94 fL      MCH 32 3 pg      MCHC 34 3 g/dL      RDW 15 9 %      MPV 11 2 fL      Platelets 060 Thousands/uL      nRBC 0 /100 WBCs      Neutrophils Relative 55 %      Immat GRANS % 0 %      Lymphocytes Relative 26 %      Monocytes Relative 18 %      Eosinophils Relative 1 %      Basophils Relative 0 %      Neutrophils Absolute 1 84 Thousands/µL      Immature Grans Absolute 0 01 Thousand/uL      Lymphocytes Absolute 0 89 Thousands/µL      Monocytes Absolute 0 62 Thousand/µL      Eosinophils Absolute 0 02 Thousand/µL      Basophils Absolute 0 01 Thousands/µL                  X-ray chest 1 view portable   Final Result by Johanny Montana MD (06/27 1436)      No acute cardiopulmonary disease  Workstation performed: [id number]                    Procedures  ECG 12 Lead Documentation Only  Date/Time: 6/27/2020 1:45 PM  Performed by: Mik Grier MD  Authorized by:  Mik Grier MD     ECG reviewed by me, the ED Provider: yes    Patient location:  ED  Previous ECG:     Comparison to cardiac monitor: Yes    Interpretation:     Interpretation: abnormal    Rate:     ECG rate:  60bpm    ECG rate assessment: normal    Rhythm:     Rhythm: sinus rhythm    Ectopy:     Ectopy: none    QRS:     QRS axis:  Normal  Conduction:     Conduction: normal    ST segments:     ST segments:  Abnormal    Elevation:  V2    Depression:  V6  T waves:     T waves: flattening and inverted      Flattening:  I, aVR, V4, V3 and V5    Inverted:  AVL  Comments:      No STEMI  CT 162ms  QRS 94ms  QT 436ms             ED Course  ED Course as of Jun 27 1703   Sat Jun 27, 2020   1354 CXR:  Where read interpreted by me  Negative for pneumothorax, mediastinal widening, focal consolidation or pleural effusion  [address] with Tete Randolphyer at nursing home who knows patient well  She states that patient is able to eat without difficulty but the patient thinks that he has difficulty and that his anxiety makes him very anxious when he is about to eat  He had a swallow study a year ago which was normal   No stroke history  He eats PBJ sandwiches, Ensure and cookies usually  Tete Mata states that patient's primary care physician, Dr Josefina Gallagher was at nursing home this morning and instructed her to send patient to the ER for dehydration after he evaluated patient  She states that patient has had significant weight loss over the last three months due to his anorexia and failure to thrive  She admits that patient stated to her that he wanted to die this morning which was the first time he has ever said that  When she stated to patient when he was leaving with EMS that she would "see him when he came back, "the patient turned around and said "no you won't, I am going to die now "      [phone] Reassessed patient  He is requesting something to eat  Updated him on his labs and plan for admission for failure to thrive and suicidal ideation   Requested nurse to have sitter due to [place]       [phone]  Margy Dhaliwal accepts patient to [facility] inpatient for failure to thrive, dehydration, decreased oral intake and suicidal ideation  Patient will require psychiatric evaluation during hospital admission  US AUDIT      Most Recent Value   Initial Alcohol Screen: US AUDIT-C    1  How often do you have a drink containing alcohol?  0 Filed at: 06/27/2020 1337   2  How many drinks containing alcohol do you have on a typical day you are drinking? 0 Filed at: 06/27/2020 1337   3a  Male UNDER 65: How often do you have five or more drinks on one occasion? 0 Filed at: 06/27/2020 1337   3b  FEMALE Any Age, or MALE 65+: How often do you have 4 or more drinks on one occassion? 0 Filed at: 06/27/2020 1337   Audit-C Score  0 Filed at: 06/27/2020 1337                  GLENN/DAST-10      Most Recent Value   How many times in the past year have you    Used an illegal drug or used a prescription medication for non-medical reasons?   Never Filed at: 06/27/2020 1256        MDM  Number of Diagnoses or Management Options  Anorexia:   Anxiety:   Dehydration:   Failure to thrive in adult:   Suicidal ideation:      Amount and/or Complexity of Data Reviewed  Clinical lab tests: reviewed and ordered  Tests in the radiology section of CPT®: reviewed and ordered  Tests in the medicine section of CPT®: reviewed and ordered  Obtain history from someone other than the patient: yes (Staff at nursing home)  Review and summarize past medical records: yes  Discuss the patient with other providers: yes (Hospitalist, Dr Barrera)  Independent visualization of images, tracings, or specimens: yes (Chest x-ray, EKG)    Risk of Complications, Morbidity, and/or Mortality  Presenting problems: moderate  Diagnostic procedures: moderate  Management options: moderate    Patient Progress  Patient progress: stable        Disposition  Final diagnoses:   Dehydration   Failure to thrive in adult   Anorexia   Anxiety   Suicidal ideation     Time reflects when diagnosis was documented in both MDM as applicable and the Disposition within this note     Time User Action Codes Description Comment    6/27/2020  1:47 PM Wylene Bina Add [E86 0] Dehydration     6/27/2020  1:48 PM Wylene Bina Add [R62 7] Failure to thrive in adult     6/27/2020  1:48 PM Wylene Bina Add [R63 0] Anorexia     6/27/2020  1:48 PM Wylene Bina Add [F41 9] Anxiety     6/27/2020  1:48 PM Wylene Bina Add [X44 594] Suicidal ideation       ED Disposition     ED Disposition Condition Date/Time Comment    Admit Stable Sat Jun 27, 2020  3:51 PM Case was discussed with Dr Ernestina Falk and the patient's admission status was agreed to be Admission Status: inpatient status to the service of Dr Ernestina Falk    Follow-up Information    None         Patient's Medications    No medications on file     No discharge procedures on file      PDMP Review     None          ED Provider  Electronically Signed by    MD Alma Welch MD  06/27/20 10 [facility] MD Aurelia  06/27/20 4039

## 2020-06-27 NOTE — ASSESSMENT & PLAN NOTE
Patient is very malnourished and friable  High risk for pressure ulcer  Follow-up PT OT  Change position every 2 hours  Follow dietitian recommendation

## 2020-06-27 NOTE — ASSESSMENT & PLAN NOTE
No results found for: HGBA1C    No results for input(s): POCGLU in the last 72 hours      Blood Sugar Average: Last 72 hrs:   sliding scale  Continue gabapentin

## 2020-06-28 PROBLEM — F42.2 MIXED OBSESSIONAL THOUGHTS AND ACTS: Status: ACTIVE | Noted: 2020-06-28

## 2020-06-28 LAB
ALBUMIN SERPL BCP-MCNC: 3 G/DL (ref 3.5–5)
ALP SERPL-CCNC: 62 U/L (ref 46–116)
ALT SERPL W P-5'-P-CCNC: 11 U/L (ref 12–78)
ANION GAP SERPL CALCULATED.3IONS-SCNC: 4 MMOL/L (ref 4–13)
AST SERPL W P-5'-P-CCNC: 16 U/L (ref 5–45)
BILIRUB SERPL-MCNC: 0.2 MG/DL (ref 0.2–1)
BUN SERPL-MCNC: 20 MG/DL (ref 5–25)
CALCIUM SERPL-MCNC: 8.6 MG/DL (ref 8.3–10.1)
CHLORIDE SERPL-SCNC: 112 MMOL/L (ref 100–108)
CO2 SERPL-SCNC: 28 MMOL/L (ref 21–32)
CREAT SERPL-MCNC: 0.95 MG/DL (ref 0.6–1.3)
GFR SERPL CREATININE-BSD FRML MDRD: 76 ML/MIN/1.73SQ M
GLUCOSE SERPL-MCNC: 100 MG/DL (ref 65–140)
GLUCOSE SERPL-MCNC: 113 MG/DL (ref 65–140)
GLUCOSE SERPL-MCNC: 83 MG/DL (ref 65–140)
GLUCOSE SERPL-MCNC: 83 MG/DL (ref 65–140)
GLUCOSE SERPL-MCNC: 94 MG/DL (ref 65–140)
INR PPP: 1.73 (ref 0.84–1.19)
MAGNESIUM SERPL-MCNC: 1.8 MG/DL (ref 1.6–2.6)
PHOSPHATE SERPL-MCNC: 3.2 MG/DL (ref 2.3–4.1)
POTASSIUM SERPL-SCNC: 4.3 MMOL/L (ref 3.5–5.3)
PROT SERPL-MCNC: 5.5 G/DL (ref 6.4–8.2)
PROTHROMBIN TIME: 20.4 SECONDS (ref 11.6–14.5)
SODIUM SERPL-SCNC: 144 MMOL/L (ref 136–145)

## 2020-06-28 PROCEDURE — 85610 PROTHROMBIN TIME: CPT | Performed by: FAMILY MEDICINE

## 2020-06-28 PROCEDURE — 83735 ASSAY OF MAGNESIUM: CPT | Performed by: FAMILY MEDICINE

## 2020-06-28 PROCEDURE — 99232 SBSQ HOSP IP/OBS MODERATE 35: CPT | Performed by: FAMILY MEDICINE

## 2020-06-28 PROCEDURE — 82948 REAGENT STRIP/BLOOD GLUCOSE: CPT

## 2020-06-28 PROCEDURE — 80053 COMPREHEN METABOLIC PANEL: CPT | Performed by: FAMILY MEDICINE

## 2020-06-28 PROCEDURE — 84100 ASSAY OF PHOSPHORUS: CPT | Performed by: FAMILY MEDICINE

## 2020-06-28 RX ADMIN — SUCRALFATE 1 G: 1 TABLET ORAL at 16:54

## 2020-06-28 RX ADMIN — ROPINIROLE HYDROCHLORIDE 0.5 MG: 0.25 TABLET, FILM COATED ORAL at 22:20

## 2020-06-28 RX ADMIN — METOPROLOL TARTRATE 25 MG: 25 TABLET, FILM COATED ORAL at 08:11

## 2020-06-28 RX ADMIN — SODIUM CHLORIDE 75 ML/HR: 0.9 INJECTION, SOLUTION INTRAVENOUS at 08:11

## 2020-06-28 RX ADMIN — MIRTAZAPINE 15 MG: 15 TABLET, FILM COATED ORAL at 22:20

## 2020-06-28 RX ADMIN — WARFARIN SODIUM 4 MG: 4 TABLET ORAL at 16:54

## 2020-06-28 RX ADMIN — QUETIAPINE FUMARATE 50 MG: 25 TABLET ORAL at 22:20

## 2020-06-28 RX ADMIN — TAMSULOSIN HYDROCHLORIDE 0.4 MG: 0.4 CAPSULE ORAL at 16:53

## 2020-06-28 RX ADMIN — GABAPENTIN 100 MG: 100 CAPSULE ORAL at 22:20

## 2020-06-28 NOTE — NURSING NOTE
Patient refused morning medication stating the medication provided is his "old medication" and that he doesn't take them anymore  I explained each medication and what they help with  Patient stated  " I don't think any of you know what you're doing  I don't take these medications"  Then continued to explain that he doesn't know what he takes at the nursing home  He opted to not take any medication and refused his breakfast because he feels like he is unable to eat d/t the feeling in his throat

## 2020-06-28 NOTE — ASSESSMENT & PLAN NOTE
As per chart review  Status post pacemaker placement  Continue home medication  As per son, patient had history of triple bypass surgery in [facility] long time ago as well as recently change pacemaker battery in [place] few months ago

## 2020-06-28 NOTE — ASSESSMENT & PLAN NOTE
Patient was running bradycardic  Continue metoprolol-dose decreased to 25 mg  Continue Coumadin-patient was taking 3 mg, will increase the dose to 4 mg  INR is 1 73  Recheck INR  Stress test in 2008: EF 43%

## 2020-06-28 NOTE — ASSESSMENT & PLAN NOTE
Unknown etiology  Follow dysphagia diet  Follow aspiration precaution  Follow GI consult  Follow a speech and swallow recommendation  S/P esophagogram on 11/19:Limited examination revealing no gross hypopharyngeal or esophageal  abnormalities

## 2020-06-28 NOTE — PLAN OF CARE
Problem: Potential for Falls  Goal: Patient will remain free of falls  Description  INTERVENTIONS:  - Assess patient frequently for physical needs  -  Identify cognitive and physical deficits and behaviors that affect risk of falls  -  [place] fall precautions as indicated by assessment   - Educate patient/family on patient safety including physical limitations  - Instruct patient to call for assistance with activity based on assessment  - Modify environment to reduce risk of injury  - Consider OT/PT consult to assist with strengthening/mobility  Outcome: Not Progressing     Problem: Prexisting or High Potential for Compromised Skin Integrity  Goal: Skin integrity is maintained or improved  Description  INTERVENTIONS:  - Identify patients at risk for skin breakdown  - Assess and monitor skin integrity  - Assess and monitor nutrition and hydration status  - Monitor labs   - Assess for incontinence   - Turn and reposition patient  - Assist with mobility/ambulation  - Relieve pressure over bony prominences  - Avoid friction and shearing  - Provide appropriate hygiene as needed including keeping skin clean and dry  - Evaluate need for skin moisturizer/barrier cream  - Collaborate with interdisciplinary team   - Patient/family teaching  - Consider wound care consult   Outcome: Not Progressing     Problem: Nutrition/Hydration-ADULT  Goal: Nutrient/Hydration intake appropriate for improving, restoring or maintaining nutritional needs  Description  Monitor and assess patient's nutrition/hydration status for malnutrition  Collaborate with interdisciplinary team and initiate plan and interventions as ordered  Monitor patient's weight and dietary intake as ordered or per policy  Utilize nutrition screening tool and intervene as necessary  Determine patient's food preferences and provide high-protein, high-caloric foods as appropriate       INTERVENTIONS:  - Monitor oral intake, urinary output, labs, and treatment plans  - Assess nutrition and hydration status and recommend course of action  - Evaluate amount of meals eaten  - Assist patient with eating if necessary   - Allow adequate time for meals  - Recommend/ encourage appropriate diets, oral nutritional supplements, and vitamin/mineral supplements  - Order, calculate, and assess calorie counts as needed  - Recommend, monitor, and adjust tube feedings and TPN/PPN based on assessed needs  - Assess need for intravenous fluids  - Provide specific nutrition/hydration education as appropriate  - Include patient/family/caregiver in decisions related to nutrition  Outcome: Not Progressing

## 2020-06-28 NOTE — ASSESSMENT & PLAN NOTE
No results found for: HGBA1C    Recent Labs     06/27/20  1831 06/28/20  0739 06/28/20  1032   POCGLU 106 83 113       Blood Sugar Average: Last 72 hrs:  (P) 285 [id number] sliding scale  Continue gabapentin

## 2020-06-28 NOTE — PROGRESS NOTES
Progress Note - Kyara Chino 1940, 68 y o  male MRN: [id number]    Unit/Bed#: -[facility] Encounter: [id number]    Primary Care Provider: Chantelle Packer MD   Date and time admitted to hospital: 6/27/2020 12:53 PM        Permanent atrial fibrillation [facility])  Assessment & Plan  Patient was running bradycardic  Continue metoprolol-dose decreased to 25 mg  Continue Coumadin-patient was taking 3 mg, will increase the dose to 4 mg  INR is 1 73  Recheck INR  Stress test in 2008: EF 43%      * Oropharyngeal dysphagia  Assessment & Plan  Unknown etiology  Follow dysphagia diet  Follow aspiration precaution  Follow GI consult  Follow a speech and swallow recommendation  S/P esophagogram on 11/19:Limited examination revealing no gross hypopharyngeal or esophageal  abnormalities      Mixed obsessional thoughts and acts  Assessment & Plan  Severe nature  Continue current treatment and management    Physical deconditioning  Assessment & Plan  Patient is very malnourished and friable  High risk for pressure ulcer  Follow-up PT OT  Change position every 2 hours  Follow dietitian recommendation    Cardiomyopathy [facility])  Assessment & Plan  As per chart review  Status post pacemaker placement  Continue home medication  As per son, patient had history of triple bypass surgery in [facility] long time ago as well as recently change pacemaker battery in [place] few months ago      Bipolar 1 disorder, depressed ([address] )  Assessment & Plan  Continue home medication      Type 2 diabetes mellitus with diabetic neuropathy, without long-term current use of insulin [facility])  Assessment & Plan  No results found for: HGBA1C    Recent Labs     06/27/20  1831 06/28/20  0739 06/28/20  1032   POCGLU 106 83 113       Blood Sugar Average: Last 72 hrs:  (P) 214 [id number] sliding scale  Continue gabapentin      Urinary retention  Assessment & Plan  History of urinary retention  Follow urinary retention protocol    Anxiety  Assessment & Plan  Continue home medication  Monitoring mental status    Generalized weakness  Assessment & Plan  Secondary to multiple factors  Continue nutritional supplement  Continue done  Will bolus 1 time  Monitor vital  Follow PT OT, speech evaluation    Failure to thrive in adult  Assessment & Plan  TSH is normal  Continue nutritional supplement  Continue diet  Follow nutritional consult  Follow speech and swallow  Follow-up PT OT recommendation        VTE Pharmacologic Prophylaxis:   Pharmacologic: Warfarin (Coumadin)  Mechanical VTE Prophylaxis in Place: Yes    Patient Centered Rounds: I have performed bedside rounds with nursing staff today  Education and Discussions with Family / Patient:  Yes with patient and his son on bedside    Time Spent for Care: 30 minutes  More than 50% of total time spent on counseling and coordination of care as described above  Current Length of Stay: 1 day(s)    Current Patient Status: Inpatient   Certification Statement: The patient will continue to require additional inpatient hospital stay due to Dysphagia, AFib    Discharge Plan / Estimated Discharge Date: To be determined      Code Status: Level 3 - DNAR and DNI      Subjective:   Seen and evaluated during  No overnight issues  As per nurse, patient was refusing the medications  Patient's son, POA on bedside  As per son, patient had history of triple bypass surgery long time ago in [facility] and recently patient had evaluated by cardiologist at [facility] where the battery was changed few months ago  As per son, patient was evaluated for dysphagia before and was told there was no problem  As per son, patient has severe OCD especially regarding patient seems everything started    As per patient, he things that we are not giving him medication correctly and we not following the up-to-date medication list   Denies any suicidal or homicidal ideation    Objective:     Vitals:   Temp (24hrs), Av 4 °F (36 9 °C), Min:97 1 °F (36 2 °C), Max:99 3 °F (37 4 °C)    Temp:  [97 1 °F (36 2 °C)-99 3 °F (37 4 °C)] 99 3 °F (37 4 °C)  HR:  [47-76] 66  Resp:  [12-20] 12  BP: ()/(50-89) 148/89  SpO2:  [97 %-100 %] 97 %  Body mass index is 17 65 kg/m²  Input and Output Summary (last 24 hours): Intake/Output Summary (Last 24 hours) at 6/28/2020 1122  Last data filed at 6/28/2020 7548  Gross per 24 hour   Intake 2141 25 ml   Output 1025 ml   Net 1116 25 ml       Physical Exam:     Physical Exam   Constitutional: He is oriented to person, place, and time  He appears cachectic  HENT:   Mouth/Throat: Oropharynx is clear and moist  No oropharyngeal exudate  Eyes: Pupils are equal, round, and reactive to light  Conjunctivae and EOM are normal  No scleral icterus  Neck: Normal range of motion  Neck supple  No JVD present  Cardiovascular: Normal rate  Exam reveals no gallop and no friction rub  Pulmonary/Chest: Effort normal and breath sounds normal  No stridor  No respiratory distress  Abdominal: Soft  Bowel sounds are normal  He exhibits no distension  There is no tenderness  There is no guarding  Musculoskeletal: Normal range of motion  Neurological: He is alert and oriented to person, place, and time  No cranial nerve deficit  Coordination normal    Skin: Skin is warm  Psychiatric: His behavior is normal  His mood appears anxious  Thought content is not paranoid  He expresses no homicidal and no suicidal ideation  He expresses no suicidal plans  Vitals reviewed        Additional Data:     Labs:    Results from last 7 days   Lab Units 06/27/20  1324   WBC Thousand/uL 3 39*   HEMOGLOBIN g/dL 12 8   HEMATOCRIT % 37 3   PLATELETS Thousands/uL 111*   NEUTROS PCT % 55   LYMPHS PCT % 26   MONOS PCT % 18*   EOS PCT % 1     Results from last 7 days   Lab Units 06/28/20  0520   POTASSIUM mmol/L 4 3   CHLORIDE mmol/L 112*   CO2 mmol/L 28   BUN mg/dL 20   CREATININE mg/dL 0 95   CALCIUM mg/dL 8 6   ALK PHOS U/L 62 ALT U/L 11*   AST U/L 16     Results from last 7 days   Lab Units 06/28/20  0520   INR  1 73*       * I Have Reviewed All Lab Data Listed Above  * Additional Pertinent Lab Tests Reviewed:  All Labs Within Last 24 Hours Reviewed      Last 24 Hours Medication List:     Current Facility-Administered Medications:  acetaminophen 650 mg Oral Q6H PRN Fredrick Mckinnon MD    ALPRAZolam 0 25 mg Oral BID PRN Yoly Mims MD    aluminum-magnesium hydroxide-simethicone 30 mL Oral Q6H PRN Yoly Mims MD    aspirin 81 mg Oral Daily Yoly Mims MD    atorvastatin 10 mg Oral Daily Genevieve Mckinnon MD    docusate sodium 100 mg Oral BID Yoly Mims MD    ergocalciferol 50,000 Units Oral Weekly Yoly Mims MD    gabapentin 100 mg Oral HS Yoly Mims MD    hydrALAZINE 10 mg Intravenous Once Ayush Wing MD    insulin lispro 1-5 Units Subcutaneous TID AC Yoly Mims MD    insulin lispro 1-5 Units Subcutaneous HS Yloy Mims MD    levothyroxine 50 mcg Oral Daily Yoly Mims MD    lisinopril 10 mg Oral Daily Yoly Mims MD    metoprolol tartrate 25 mg Oral QAM Genevieve Mckinnon MD    mirtazapine 15 mg Oral HS Genevieve Mckinnon MD    nitroglycerin 0 4 mg Sublingual Q5 Min PRN Genevieve Mckinnon MD    ondansetron 4 mg Intravenous Q6H PRN Yoly Mims MD    pantoprazole 40 mg Oral Daily Yoly Mims MD    potassium chloride 20 mEq Oral Daily Yoly Mims MD    QUEtiapine 50 mg Oral HS Yoly iMms MD    rOPINIRole 0 5 mg Oral HS Yoly Mims MD    sertraline 25 mg Oral QAM Yoly Mism MD    sodium chloride (PF) 3 mL Intravenous Q1H PRN Ayush Wing MD    sodium chloride 75 mL/hr Intravenous Continuous Ayush Wing MD Last Rate: 75 mL/hr (06/28/20 0811)   sucralfate 1 g Oral BID Yoly Mims MD    tamsulosin 0 4 mg Oral Daily With Linden Griggs MD    warfarin 4 mg Oral Daily (warfarin) Yoly Mims MD Today, Patient Was Seen By: Андрей Parisi MD    ** Please Note: Dragon 360 Dictation voice to text software may have been used in the creation of this document   **

## 2020-06-29 ENCOUNTER — APPOINTMENT (INPATIENT)
Dept: NON INVASIVE DIAGNOSTICS | Facility: HOSPITAL | Age: 80
DRG: 391 | End: 2020-06-29
Payer: MEDICARE

## 2020-06-29 PROBLEM — E43 SEVERE PROTEIN-CALORIE MALNUTRITION (HCC): Status: ACTIVE | Noted: 2020-06-29

## 2020-06-29 PROBLEM — I51.3 LEFT VENTRICULAR APICAL THROMBUS: Status: ACTIVE | Noted: 2020-06-29

## 2020-06-29 LAB
ALBUMIN SERPL BCP-MCNC: 3.5 G/DL (ref 3.5–5)
ALP SERPL-CCNC: 82 U/L (ref 46–116)
ALT SERPL W P-5'-P-CCNC: 13 U/L (ref 12–78)
ANION GAP SERPL CALCULATED.3IONS-SCNC: 6 MMOL/L (ref 4–13)
APTT PPP: 29 SECONDS (ref 23–37)
AST SERPL W P-5'-P-CCNC: 18 U/L (ref 5–45)
ATRIAL RATE: 60 BPM
BILIRUB SERPL-MCNC: 0.46 MG/DL (ref 0.2–1)
BUN SERPL-MCNC: 17 MG/DL (ref 5–25)
CALCIUM SERPL-MCNC: 9.1 MG/DL (ref 8.3–10.1)
CHLORIDE SERPL-SCNC: 109 MMOL/L (ref 100–108)
CO2 SERPL-SCNC: 29 MMOL/L (ref 21–32)
CREAT SERPL-MCNC: 1.05 MG/DL (ref 0.6–1.3)
ERYTHROCYTE [DISTWIDTH] IN BLOOD BY AUTOMATED COUNT: 15.7 % (ref 11.6–15.1)
GFR SERPL CREATININE-BSD FRML MDRD: 68 ML/MIN/1.73SQ M
GLUCOSE SERPL-MCNC: 100 MG/DL (ref 65–140)
GLUCOSE SERPL-MCNC: 108 MG/DL (ref 65–140)
GLUCOSE SERPL-MCNC: 84 MG/DL (ref 65–140)
GLUCOSE SERPL-MCNC: 85 MG/DL (ref 65–140)
GLUCOSE SERPL-MCNC: 97 MG/DL (ref 65–140)
HCT VFR BLD AUTO: 37.3 % (ref 36.5–49.3)
HGB BLD-MCNC: 12.6 G/DL (ref 12–17)
INR PPP: 1.75 (ref 0.84–1.19)
INR PPP: 1.79 (ref 0.84–1.19)
MCH RBC QN AUTO: 32.4 PG (ref 26.8–34.3)
MCHC RBC AUTO-ENTMCNC: 33.8 G/DL (ref 31.4–37.4)
MCV RBC AUTO: 96 FL (ref 82–98)
MRSA NOSE QL CULT: NORMAL
P AXIS: 85 DEGREES
PLATELET # BLD AUTO: 115 THOUSANDS/UL (ref 149–390)
PMV BLD AUTO: 9.2 FL (ref 8.9–12.7)
POTASSIUM SERPL-SCNC: 3.9 MMOL/L (ref 3.5–5.3)
PR INTERVAL: 162 MS
PROT SERPL-MCNC: 6.6 G/DL (ref 6.4–8.2)
PROTHROMBIN TIME: 20.6 SECONDS (ref 11.6–14.5)
PROTHROMBIN TIME: 20.9 SECONDS (ref 11.6–14.5)
QRS AXIS: 76 DEGREES
QRSD INTERVAL: 94 MS
QT INTERVAL: 436 MS
QTC INTERVAL: 436 MS
RBC # BLD AUTO: 3.89 MILLION/UL (ref 3.88–5.62)
SODIUM SERPL-SCNC: 144 MMOL/L (ref 136–145)
T WAVE AXIS: 11 DEGREES
VENTRICULAR RATE: 60 BPM
WBC # BLD AUTO: 5.93 THOUSAND/UL (ref 4.31–10.16)

## 2020-06-29 PROCEDURE — 92610 EVALUATE SWALLOWING FUNCTION: CPT

## 2020-06-29 PROCEDURE — 97116 GAIT TRAINING THERAPY: CPT

## 2020-06-29 PROCEDURE — 97163 PT EVAL HIGH COMPLEX 45 MIN: CPT

## 2020-06-29 PROCEDURE — 85027 COMPLETE CBC AUTOMATED: CPT | Performed by: FAMILY MEDICINE

## 2020-06-29 PROCEDURE — C8929 TTE W OR WO FOL WCON,DOPPLER: HCPCS

## 2020-06-29 PROCEDURE — 97167 OT EVAL HIGH COMPLEX 60 MIN: CPT

## 2020-06-29 PROCEDURE — 85730 THROMBOPLASTIN TIME PARTIAL: CPT | Performed by: FAMILY MEDICINE

## 2020-06-29 PROCEDURE — 82948 REAGENT STRIP/BLOOD GLUCOSE: CPT

## 2020-06-29 PROCEDURE — 97535 SELF CARE MNGMENT TRAINING: CPT

## 2020-06-29 PROCEDURE — 99233 SBSQ HOSP IP/OBS HIGH 50: CPT | Performed by: FAMILY MEDICINE

## 2020-06-29 PROCEDURE — 85610 PROTHROMBIN TIME: CPT | Performed by: FAMILY MEDICINE

## 2020-06-29 PROCEDURE — 80053 COMPREHEN METABOLIC PANEL: CPT | Performed by: FAMILY MEDICINE

## 2020-06-29 RX ORDER — HEPARIN SODIUM 10000 [USP'U]/100ML
3-20 INJECTION, SOLUTION INTRAVENOUS
Status: DISCONTINUED | OUTPATIENT
Start: 2020-06-29 | End: 2020-07-04

## 2020-06-29 RX ORDER — HEPARIN SODIUM 1000 [USP'U]/ML
1500 INJECTION, SOLUTION INTRAVENOUS; SUBCUTANEOUS
Status: DISCONTINUED | OUTPATIENT
Start: 2020-06-29 | End: 2020-07-04

## 2020-06-29 RX ORDER — HEPARIN SODIUM 1000 [USP'U]/ML
3000 INJECTION, SOLUTION INTRAVENOUS; SUBCUTANEOUS ONCE
Status: COMPLETED | OUTPATIENT
Start: 2020-06-29 | End: 2020-06-29

## 2020-06-29 RX ORDER — HEPARIN SODIUM 1000 [USP'U]/ML
3000 INJECTION, SOLUTION INTRAVENOUS; SUBCUTANEOUS
Status: DISCONTINUED | OUTPATIENT
Start: 2020-06-29 | End: 2020-07-04

## 2020-06-29 RX ADMIN — ALPRAZOLAM 0.25 MG: 0.25 TABLET ORAL at 17:41

## 2020-06-29 RX ADMIN — SERTRALINE HYDROCHLORIDE 25 MG: 25 TABLET ORAL at 09:03

## 2020-06-29 RX ADMIN — HEPARIN SODIUM AND DEXTROSE 12 UNITS/KG/HR: 10000; 5 INJECTION INTRAVENOUS at 18:11

## 2020-06-29 RX ADMIN — ATORVASTATIN CALCIUM 10 MG: 10 TABLET, FILM COATED ORAL at 09:03

## 2020-06-29 RX ADMIN — GABAPENTIN 100 MG: 100 CAPSULE ORAL at 21:55

## 2020-06-29 RX ADMIN — MIRTAZAPINE 15 MG: 15 TABLET, FILM COATED ORAL at 21:54

## 2020-06-29 RX ADMIN — LISINOPRIL 10 MG: 10 TABLET ORAL at 09:03

## 2020-06-29 RX ADMIN — ASPIRIN 81 MG: 81 TABLET, COATED ORAL at 09:03

## 2020-06-29 RX ADMIN — TAMSULOSIN HYDROCHLORIDE 0.4 MG: 0.4 CAPSULE ORAL at 17:33

## 2020-06-29 RX ADMIN — METOPROLOL TARTRATE 25 MG: 25 TABLET, FILM COATED ORAL at 09:03

## 2020-06-29 RX ADMIN — PERFLUTREN 0.6 ML/MIN: 6.52 INJECTION, SUSPENSION INTRAVENOUS at 09:19

## 2020-06-29 RX ADMIN — PANTOPRAZOLE SODIUM 40 MG: 40 TABLET, DELAYED RELEASE ORAL at 09:03

## 2020-06-29 RX ADMIN — SUCRALFATE 1 G: 1 TABLET ORAL at 17:33

## 2020-06-29 RX ADMIN — DOCUSATE SODIUM 100 MG: 100 CAPSULE, LIQUID FILLED ORAL at 09:03

## 2020-06-29 RX ADMIN — SUCRALFATE 1 G: 1 TABLET ORAL at 09:03

## 2020-06-29 RX ADMIN — LEVOTHYROXINE SODIUM 50 MCG: 50 TABLET ORAL at 09:03

## 2020-06-29 RX ADMIN — POTASSIUM CHLORIDE 20 MEQ: 20 SOLUTION ORAL at 09:02

## 2020-06-29 RX ADMIN — ROPINIROLE HYDROCHLORIDE 0.5 MG: 0.25 TABLET, FILM COATED ORAL at 21:55

## 2020-06-29 RX ADMIN — QUETIAPINE FUMARATE 50 MG: 25 TABLET ORAL at 21:54

## 2020-06-29 RX ADMIN — WARFARIN SODIUM 4 MG: 4 TABLET ORAL at 17:33

## 2020-06-29 RX ADMIN — HEPARIN SODIUM 3000 UNITS: 1000 INJECTION INTRAVENOUS; SUBCUTANEOUS at 18:01

## 2020-06-29 NOTE — ASSESSMENT & PLAN NOTE
As per echocardiogram:  LEFT VENTRICLE:  There is a large size, 1x1 cm, apical LV thrombus  There is large size anterior, inferior, lateral and septal apical wall motion abnormality with dyskinesis of the LV segments  Size was at the upper limits of normal   Ejection fraction was estimated in the range of 35 % to 40 %  There was mild diffuse hypokinesis with regional variations  There was dyskinesis of the apical wall(s)  Doppler parameters were consistent with a reversible restrictive pattern, indicative of decreased left ventricular diastolic compliance and/or increased left atrial pressure (grade 3 diastolic dysfunction)      RIGHT VENTRICLE:  There is a pacemaker wire in the RV  The size was normal   Systolic function was normal      MITRAL VALVE:  There was mild regurgitation      TRICUSPID VALVE:  There was mild regurgitation      Follow cardiology consult  Continue Coumadin  Patient is high risk for embolic event

## 2020-06-29 NOTE — ASSESSMENT & PLAN NOTE
No results found for: HGBA1C    Recent Labs     06/28/20  2124 06/29/20  0729 06/29/20  1047 06/29/20  1619   POCGLU 94 97 100 85       Blood Sugar Average: Last 72 hrs:  (P) 97 25 sliding scale  Continue gabapentin

## 2020-06-29 NOTE — PHYSICAL THERAPY NOTE
PHYSICAL THERAPY EVALUATION  NAME:  Koffi Albrecht  DATE: 06/29/20    AGE:   68 y o  Mrn:   [id number]  ADMIT DX:  Anorexia [R63 0]  Dehydration [E86 0]  Suicidal ideation [R45 851]  Anxiety [F41 9]  Weakness [R53 1]  Failure to thrive in adult [R62 7]    Past Medical History:   Diagnosis Date    A-fib ([place] [address] )     Anemia     Bipolar 1 disorder ([place] [address] )     Cardiomegaly     Depression     Diabetes mellitus ([place] [address] )     Disease of thyroid gland     Epigastric pain     MI (myocardial infarction) ([place] [address] )     Panic disorder (episodic paroxysmal anxiety)     SVT (supraventricular tachycardia) ([place] [address] )     Urinary retention     Vertigo      Length Of Stay: 2  Performed at least 2 patient identifiers during session: Name and Birthday  PHYSICAL THERAPY EVALUATION :      06/29/20 1100   Note Type   Note type Eval/Treat   Pain Assessment   Pain Assessment Tool 0-10   Pain Score No Pain   Home Living   Type of Home Assisted living   [address]   Prior Function   Level of [place] Needs assistance with ADLs and functional mobility   Lives With Facility staff   Receives Help From Personal care attendant   ADL Assistance Needs assistance   IADLs Needs assistance   Falls in the last 6 months 0   Comments Pt offering inconsistent information regarding PLOF  Reported he was independent wiht wheelchair and with taking himself to the bathroom at times, then reported he required assistance  Questionable historian  Restrictions/Precautions   Other Precautions Cognitive; Chair Alarm; Bed Alarm; Fall Risk   Cognition   Orientation Level Oriented to person;Oriented to place;Oriented to time;Disoriented to situation   Following Commands Follows one step commands with increased time or repetition   RLE Assessment   RLE Assessment WFL  (3+/5)   LLE Assessment   LLE Assessment WFL  (3+/5)   Bed Mobility   Supine to Sit 5  Supervision   Additional items Increased time required;Verbal cues   Additional Comments HOB flat without bedrail  increased time to complete  Transfers   Sit to Stand 4  Minimal assistance   Additional items Assist x 1; Increased time required;Verbal cues   Stand to Sit 4  Minimal assistance   Additional items Assist x 1; Increased time required;Verbal cues   Stand pivot 4  Minimal assistance   Additional items Assist x 1;Verbal cues; Impulsive   Additional Comments completed sit<>stand with minAx1 wiht min cues for hand placement and safety with mobility  spt with min/steadying assistance with verbal cues for safety and impulsiveness  stand to sit wiht minAx1 with cues for controlled descent  Ambulation/Elevation   Gait pattern Narrow KATLYN; Decreased foot clearance   Gait Assistance   (min to steadying assistance)   Additional items Verbal cues   Assistive Device Rolling walker   Distance 12'x1 with RW with steadying to minAx1 with NBOS, decreased step length and min cues to stay wihtin KATLYN of RW and increase step length  Balance   Static Sitting Good   Dynamic Sitting Fair +   Static Standing Fair   Dynamic Standing Fair -   Ambulatory Fair -   Activity Tolerance   Activity Tolerance Patient tolerated treatment well   Medical Staff Made Aware Stephanie REID   Nurse Made Aware RNJean Pierre   Assessment   Prognosis Good   Problem List Decreased strength;Decreased endurance; Impaired balance;Decreased mobility; Impaired judgement;Decreased safety awareness;Decreased cognition   Goals   Patient Goals "Go poop "   STG Expiration Date 07/09/20   PT Treatment Day 1   Plan   Treatment/Interventions Functional transfer training;LE strengthening/ROM; Therapeutic exercise; Endurance training;Patient/family training;Equipment eval/education; Bed mobility;Gait training; Compensatory technique education;Spoke to nursing;Spoke to case management;OT   PT Frequency Other (Comment)  (3-5x/week)   Recommendation   PT Discharge Recommendation   (return to [facility] with skilled therapy)   Equipment Recommended (walker/wheelchair-pt has)   Additional Comments [facility] 6 clicks 83/85     (Please find full objective findings from PT assessment regarding body systems outlined above)  Assessment: Pt is a 68 y o  male seen for PT evaluation s/p admission to [address] [place] [address] on 6/27/2020 with Oropharyngeal dysphagia  Order placed for PT services  Upon evaluation: Pt is presenting with impaired functional mobility due to decreased strength, decreased endurance, impaired balance, gait deviations, impaired cognition, decreased safety awareness, impaired judgment and fall risk requiring supervision assistance for bed mobility, minimal assistance for transfers and minimal assistance for ambulation with RW  Pt's clinical presentation is currently unstable/unpredictable given the functional mobility deficits above, especially weakness, decreased endurance, gait deviations, decreased activity tolerance, decreased functional mobility tolerance, decreased safety awareness, impaired judgement and decreased cognition, coupled with fall risks as indicated by -PAC 6-Clicks: 47/87 as well as impulsivity, impaired balance, polypharmacy, impaired judgement, decreased safety awareness and decreased cognition and combined with medical complications of abnormal WBCs, impulsivity during admission and need for input for mobility technique/safety  Pt's PMHx and comorbidities that may affect physical performance and progress include: A fib and bipolar, anemia, cardiomegaly, h/o MI, panic d/o, vertigo  Personal factors affecting pt at time of IE include: anxiety, impulsivity, inability to perform ADLs, inability to navigate level surfaces without external assistance, inability to ambulate household distances and limited insight into impairments  Pt will benefit from continued skilled PT services to address deficits as defined above and to maximize level of functional mobility to facilitate return toward PLOF and improved QOL   From PT/mobility standpoint, recommendation at time of d/c would be with walker and return to intermediate with therapy pending progress in order to reduce fall risk and maximize pt's functional independence and consistency with mobility in order to facilitate return to PLOF  Recommend ther ex next 1-2 sessions  Goals: Pt will: Perform bed mobility tasks with modified I to reposition in bed and prepare for transfers  Pt will perform transfers with modified I to increase Indep in home environment, decrease burden of care, decrease risk for falls and improve activity tolerance and prepare for ambulation  Pt will ambulate with RW for >/=  with  Supervision  to increase Indep in home environment, decrease burden of care, decrease risk for falls, improve activity tolerance and improve gait quality and to access home environment  Pt will increase B LE strength >/= 1/2 MMT grade to facilitate functional mobility  Ledy Ahn PT,DPT         PHYSICAL THERAPY TREATMENT NOTE  Time In:1115  Time AJS:3210  Total Time: 5'      S:  "What happens if I poop?"  O:  Sit<>stand with RW with steadying assistance  Min cues for safety with use of RW  Spt with RW with steadying assistance  Standing reaching outside KATLYN without UE support steadying assistance  Ambulated 45'x1 with RW with steadying assistance with decreased step length, foot clearance and min cues for increased step length and safety with use of RW   A:  Pt requires minimal cues for safety with mobility due to impulsiveness  He was able to ambulate increased distance with slight decreased assistance, but requires cues for safety  He is limited by decreased strength, balance, endurance  He will continue to benefit from PT services to maximize LOF     P:  Recommend addition of therapeutic exercises to current functional mobility program      Ledy Ahn PT, DPT

## 2020-06-29 NOTE — ASSESSMENT & PLAN NOTE
Malnutrition Findings:   Malnutrition type: Chronic illness  Degree of Malnutrition: Other severe protein calorie malnutrition(related to oropharyngeal dysphagia as evidenced by < 75% energy intake > 1 mo, severe muscle wasting noted at pectoralis, deltoid, interosseous muscles, moderate fat loss at orbitals and thoracic region, 16 1% wt loss x 8 mo (10/1/19 143#, 6/27/20 120#))    BMI Findings:  BMI Classifications: Underweight < 18 5     Body mass index is 17 11 kg/m²

## 2020-06-29 NOTE — PLAN OF CARE
Problem: PHYSICAL THERAPY ADULT  Goal: Performs mobility at highest level of function for planned discharge setting  See evaluation for individualized goals  Description  Treatment/Interventions: Functional transfer training, LE strengthening/ROM, Therapeutic exercise, Endurance training, Patient/family training, Equipment eval/education, Bed mobility, Gait training, Compensatory technique education, Spoke to nursing, Spoke to case management, OT  Equipment Recommended: (walker/wheelchair-pt has)       See flowsheet documentation for full assessment, interventions and recommendations  Note:   Prognosis: Good  Problem List: Decreased strength, Decreased endurance, Impaired balance, Decreased mobility, Impaired judgement, Decreased safety awareness, Decreased cognition  Assessment: Pt is a 68 y o  male seen for PT evaluation s/p admission to [address] [place] [address] on 6/27/2020 with Oropharyngeal dysphagia  Order placed for PT services    Upon evaluation: Pt is presenting with impaired functional mobility due to decreased strength, decreased endurance, impaired balance, gait deviations, impaired cognition, decreased safety awareness, impaired judgment and fall risk requiring supervision assistance for bed mobility, minimal assistance for transfers and minimal assistance for ambulation with RW  Pt's clinical presentation is currently unstable/unpredictable given the functional mobility deficits above, especially weakness, decreased endurance, gait deviations, decreased activity tolerance, decreased functional mobility tolerance, decreased safety awareness, impaired judgement and decreased cognition, coupled with fall risks as indicated by AM-PAC 6-Clicks: 36/39 as well as impulsivity, impaired balance, polypharmacy, impaired judgement, decreased safety awareness and decreased cognition and combined with medical complications of abnormal WBCs, impulsivity during admission and need for input for mobility technique/safety  Pt's PMHx and comorbidities that may affect physical performance and progress include: A fib and bipolar, anemia, cardiomegaly, h/o MI, panic d/o, vertigo  Personal factors affecting pt at time of IE include: anxiety, impulsivity, inability to perform ADLs, inability to navigate level surfaces without external assistance, inability to ambulate household distances and limited insight into impairments  Pt will benefit from continued skilled PT services to address deficits as defined above and to maximize level of functional mobility to facilitate return toward PLOF and improved QOL  From PT/mobility standpoint, recommendation at time of d/c would be with walker and return to long term with therapy pending progress in order to reduce fall risk and maximize pt's functional independence and consistency with mobility in order to facilitate return to PLOF  Recommend ther ex next 1-2 sessions  PT Discharge Recommendation: (return to [facility] with skilled therapy)          See flowsheet documentation for full assessment

## 2020-06-29 NOTE — PLAN OF CARE
Problem: PHYSICAL THERAPY ADULT  Goal: Performs mobility at highest level of function for planned discharge setting  See evaluation for individualized goals  Description  Treatment/Interventions: Functional transfer training, LE strengthening/ROM, Therapeutic exercise, Endurance training, Patient/family training, Equipment eval/education, Bed mobility, Gait training, Compensatory technique education, Spoke to nursing, Spoke to case management, OT  Equipment Recommended: (walker/wheelchair-pt has)       See flowsheet documentation for full assessment, interventions and recommendations  3/78/3482 1890 by Remigio Hodges PT  Outcome: Progressing  Note:   Prognosis: Good  Problem List: Decreased strength, Decreased endurance, Impaired balance, Decreased mobility, Impaired judgement, Decreased safety awareness, Decreased cognition  Pt requires minimal cues for safety with mobility due to impulsiveness  He was able to ambulate increased distance with slight decreased assistance, but requires cues for safety  He is limited by decreased strength, balance, endurance  He will continue to benefit from PT services to maximize LOF  PT [facility]charge [facility]ation[facility]          See flowsheet documentation for full assessment

## 2020-06-29 NOTE — OCCUPATIONAL THERAPY NOTE
Occupational Therapy Evaluation     Patient Name: Dave Hung  LJUMR'N Date: 6/29/2020  Problem List  Principal Problem:    Oropharyngeal dysphagia  Active Problems:    Failure to thrive in adult    Generalized weakness    Permanent atrial fibrillation ([facility])    Anxiety    Urinary retention    Type 2 diabetes mellitus with diabetic neuropathy, without long-term current use of insulin ([facility])    Bipolar 1 disorder, depressed ([facility])    Cardiomyopathy ([place] [address] )    Physical deconditioning    Mixed obsessional thoughts and acts    Past Medical History  Past Medical History:   Diagnosis Date    A-fib ([place] [address] )     Anemia     Bipolar 1 disorder ([place] [address] )     Cardiomegaly     Depression     Diabetes mellitus ([place] [address] )     Disease of thyroid gland     Epigastric pain     MI (myocardial infarction) ([facility])     Panic disorder (episodic paroxysmal anxiety)     SVT (supraventricular tachycardia) ([facility])     Urinary retention     Vertigo      Past Surgical History  History reviewed  No pertinent surgical history  06/29/20 1101   Note Type   Note type Eval/Treat   Restrictions/Precautions   Other Precautions Cognitive; Chair Alarm; Bed Alarm; Fall Risk   Pain Assessment   Pain Assessment Tool 0-10   Pain Score No Pain   Home Living   Type of Home Assisted living   Prior Function   Level of [place] Needs assistance with ADLs and functional mobility   Lives With Facility staff   Receives Help From Personal care attendant   ADL Assistance Needs assistance   IADLs Needs assistance   Falls in the last 6 months 0   Comments Pt with inconsistant reporting, unable to provide accurate information at this time  Pt hyperfocused on "what happens if I poop" throughout entirity of session  ADL   Where Assessed Edge of bed   Grooming Assistance   (Steadying Assist - standing at sinkside)   Grooming Deficit Steadying;Verbal cueing;Supervision/safety; Increased time to complete;Wash/dry hands   UB Dressing Assistance 5  Supervision/Setup UB Dressing Deficit Verbal cueing;Supervision/safety; Increased time to complete   LB Dressing Assistance 4  Minimal Assistance   LB Dressing Deficit Steadying; Requires assistive device for steadying;Verbal cueing;Supervision/safety; Increased time to complete; Don/doff R sock; Don/doff L sock; Thread RLE into pants; Thread LLE into pants;Pull up over hips   Toileting Assistance  4  Minimal Assistance   Toileting Deficit Steadying;Supervison/safety; Increased time to complete;Grab bar use;Clothing management up;Clothing management down;Perineal hygiene   Additional Comments Pt completing ADL tasks with use of RW for UB support PRN  Pt completing LB dressing @ Min A for donning around feet and CM around waist  Toileting tasks @ Min A for CM and pericare (Mod A for thorough posterior pericare, Min A for frontal pericare with Max encouragement for Pt to complete on own)  Bed Mobility   Supine to Sit 5  Supervision   Additional items Increased time required;Verbal cues   Transfers   Sit to Stand 4  Minimal assistance   Additional items Assist x 1; Increased time required;Verbal cues   Stand to Sit 4  Minimal assistance   Additional items Assist x 1; Increased time required;Verbal cues   Stand pivot 4  Minimal assistance  (Steadying-Min A for safety and impulsiveness)   Additional items Assist x 1; Increased time required;Verbal cues   Additional Comments Pt completing functional transfers with use of RW for UB support  vc'ing for safety and balance, Pt with occasional impulsiveness  consistant redirection required to remain attentive to task     Balance   Static Sitting Good   Dynamic Sitting Fair +   Static Standing Fair   Dynamic Standing Fair -   Activity Tolerance   Activity Tolerance Patient tolerated treatment well   Medical Staff Made Aware Spoke with PT, Prasanth Anton   Nurse Made Aware Spoke with RN, Taya [place] Assessment   RUE Assessment WFL   LUE Assessment   LUE Assessment WFL   Hand Function   Gross Motor Coordination Functional   Fine Motor Coordination Functional   Sensation   Light Touch No apparent deficits   Cognition   Overall Cognitive Status Impaired   Arousal/Participation Alert; Responsive; Cooperative   Attention Difficulty attending to directions   Orientation Level Oriented to person;Oriented to place;Oriented to time;Disoriented to situation   Following Commands Follows one step commands with increased time or repetition   Comments Pt with inconsistant reporting regarding PLOF and home set-up  Pt hyperfocused on BM's and requiring consistant redirection  Assessment   Limitation Decreased ADL status; Decreased UE strength;Decreased Safe judgement during ADL;Decreased cognition;Decreased endurance;Decreased self-care trans;Decreased high-level ADLs   Prognosis Good;Fair   Assessment Pt is a 67 y/o M admitted [facility]/2020 d/t experiencing difficulty swallowing and failure to thrive  Dx: oropharyngeal dysphagia  Pt with significant PMHx impacting performance during functional tasks including: a-fib, anemia, bipolar 1 disorder, cardiomegaly, depression, DM II, MI, panic disorder, urinary retention, vertigo  Pt with inconstant reporting regarding PLOF and home set-up  Pt reprots he lives at an [facility] and gets around via manual w/c  Uncertain of Pt's performance during ADLs at this time  Will consult with CM regarding PLOF and home set-up as able  On evaluation, A&Ox3  Pt completing supine>sit @ S  Functional transfers @ Min A with use of RW for UB support  Decreased safety awareness and impulsiveness noted  UB dressing @ S after set-up  LB Dressing @ Min A with increased time  BUE ROM and MS WFL  Pt scoring 40/100 on Barthel Index  Pt presents with decrease activity tolerance, decrease standing balance, decrease performance during ADL tasks, decrease cognition, decrease safety awareness , decrease UB MS, decrease generalized strength, decrease activity engagement and decrease performance during functional transfers   Pt would benefit from continued acute OT services to address deficits as well as HHOT and return to skilled nursing with continued assistance/support upon d/c from [address] [place] [address]  Plan   Treatment Interventions ADL retraining;Functional transfer training;UE strengthening/ROM; Endurance training;Cognitive reorientation;Patient/family training;Equipment evaluation/education; Neuromuscular reeducation; Compensatory technique education;Continued evaluation; Energy conservation; Activityengagement   Goal Expiration Date 07/09/20   OT Treatment Day 1   OT Frequency 3-5x/wk   Additional Treatment Session   Start Time 9809   End Time 4339   Treatment Assessment Pt seen for treatment session #1 this date  Pt alert and agreeable to participate at this time  Pt completing functional transfers with use of RW @ Min A  Short distance ambulation with RW @ Steadying-Min A with vc'ing for safety and RW management  Toileting tasks @ Min A for balance, CM and thoroughness  Pt standing at sinkside while completing hand hygiene @ Steadying Assist  Pt returning to recliner chair with call bell in reach, chair alarm intact and all needs met at this time  Additional Treatment Day 1   Recommendation   Recommendation   (return to skilled nursing with continued assistance)   OT Discharge Recommendation Home with skilled therapy   Barthel Index   Feeding 10   Bathing 0   Grooming Score 5   Dressing Score 5   Bladder Score 0   Bowels Score 5   Toilet Use Score 5   Transfers (Bed/Chair) Score 10   Mobility (Level Surface) Score 0   Stairs Score 0   Barthel Index Score 40     Pt goals to be met by 7/9/2020    1  Pt will demonstrate ability to complete LB dressing @ S in order to increase safety and independence during meaningful tasks  2  Pt will demonstrate ability to complete toileting tasks including CM and pericare @ S in order to increase safety and independence during meaningful tasks    3  Pt will demonstrate ability to complete EOB, chair, toilet/commode transfers @ S in order to increase performance and participation during functional tasks  4  Pt will demonstrate ability to stand for 5-6 minutes while maintaining G balance with use of RW for UB support PRN  5  Pt will demonstrate ability to tolerate 30-35 minute OT session with no vc'ing for deep breathing or use of energy conservation techniques in order to increase activity tolerance during functional tasks  6  Pt will demonstrate Good carryover of use of energy conservation/compensatory strategies during ADLs and functional tasks in order to increase safety and reduce risk for falls  7  Pt will demonstrate Good attention and participation in continued evaluation of functional ambulation house hold distances in order to assist with safe d/c planning  8  Pt will attend to continued cognitive assessments 100% of the time in order to provide most appropriate d/c recommendations  9  Pt will follow 100% simple 1-step commands and be A&O x4 consistently with environmental cues to increase participation in functional activities  10  Pt will identify 3 areas of interest/hobbies and 1 intervention on how to incorporate into daily life in order to increase interaction with environment and peers as well as increase participation in meaningful tasks  11  Pt will demonstrate 100% carryover of BUE HEP in order to increase BUE MS and increase performance during functional tasks upon d/c home      Anny Corral, OTR/L

## 2020-06-29 NOTE — CONSULTS
Consultation - GI   Jacquie Jiménez 68 y o  male MRN: [id number]  Unit/Bed#: -01 Encounter: [id number]      Assessment/Plan     Assessment:  Dysphagia; most likely oropharyngeal dysphagia, possibly related to poor mastication because of lack of teeth and dentures, compounded by underlying anxiety disorder  Underlying stricture less likely, but need to be ruled out as well as oropharyngeal muscular weakness and aspiration also need to be ruled  Plan:  EGD; patient declined to have an EGD as he does not want to undergo any procedure with anesthesia  Modified barium swallow  Barium esophagogram    Further management based on the results of above ordered tests  Also need control of his anxiety disorder  Need to get dentures  Otherwise only pureed diet  May benefit from speech therapy if abnormalities shown on modified barium swallow  May benefit from EGD if he changes mind in future  All of the above discussed and he agreed with above  History of Present Illness   Physician Requesting Consult: Bang Davis MD  Reason for Consult / Principal Problem:  Oropharyngeal dysphagia  Hx and PE limited by:   HPI: Jacquie Jiménez is a 68y o  year old male who presents with malnutrition dysphagia  Symptoms are chronic and had underwent investigation in November last year, when he had barium swallow but the study was incomplete because of poor cooperation from the patient and the study was terminated within less than a minute  Denies odynophagia  No vomiting after eating, no nasal regurgitation, no cough or choking on eating  Consults    Review of Systems     All ten systems are negative except GI as discussed under HPI        Historical Information   Past Medical History:   Diagnosis Date    [facility] )     Anemia     Bipolar 1 disorder ([facility] )     Cardiomegaly     Depression     Diabetes mellitus ([facility] )     Disease of thyroid gland     Epigastric pain     MI (myocardial infarction) ([facility] )     Panic disorder (episodic paroxysmal anxiety)     SVT (supraventricular tachycardia) ([facility])     Urinary retention     Vertigo      History reviewed  No pertinent surgical history  Social History   Social History     Substance and Sexual Activity   Alcohol Use Never    Frequency: Never    Binge frequency: Never     Social History     Substance and Sexual Activity   Drug Use Never     E-Cigarette/Vaping    E-Cigarette Use Never User      E-Cigarette/Vaping Substances    Nicotine No     THC No     CBD No     Flavoring No     Other No     Unknown No      Social History     Tobacco Use   Smoking Status Never Smoker   Smokeless Tobacco Never Used     Family History:  Noncontributory    Meds/Allergies   all current active meds have been reviewed    No Known Allergies    Objective       Intake/Output Summary (Last 24 hours) at 6/29/2020 1419  Last data filed at 6/29/2020 1226  Gross per 24 hour   Intake 240 ml   Output 575 ml   Net -335 ml       Invasive Devices:   Peripheral IV 06/27/20 Right Antecubital (Active)   Site Assessment Clean;Dry; Intact 6/28/2020  9:00 AM   Dressing Type Transparent 6/28/2020  9:00 AM   Line Status Infusing 6/28/2020  9:00 AM   Dressing Status Intact;Dry;Clean 6/28/2020  9:00 AM   Dressing Change Due 07/01/20 6/28/2020  9:00 AM   Reason Not Rotated Not due 6/28/2020  9:00 AM       Physical Exam    Alert , pale , normal BP and R and HR  Skin; pale, no rash  HEENT: normal pinna  Normal hearing  No stridor  PUL; lungs clear bilaterally  Heart: S1S2  Abdomen: soft BS +, slight tenderness at epigastrium  Legs: no edema  Joints: no swelling  Neuro: normal speech, face symmetrical   Psych: normal affect  Normal memory  Eyes; normal sclera  Normal lids  Lab Results: I have personally reviewed pertinent reports  Imaging Studies: I have personally reviewed pertinent reports  EKG, Pathology, and Other Studies: I have personally reviewed pertinent reports        VTE Prophylaxis: Sequential compression device (Venodyne)     Counseling / Coordination of Care  Total floor / unit time spent today 30 minutes  Greater than 50% of total time was spent with the patient and / or family counseling and / or coordination of care

## 2020-06-29 NOTE — PROGRESS NOTES
Progress Note - Marilee Costello 1940, 68 y o  male MRN: [id number]    Unit/Bed#: -01 Encounter: [id number]    Primary Care Provider: Laura Washington MD   Date and time admitted to hospital: 6/27/2020 12:53 PM        Left ventricular apical thrombus  Assessment & Plan  As per echocardiogram:  LEFT VENTRICLE:  There is a large size, 1x1 cm, apical LV thrombus  There is large size anterior, inferior, lateral and septal apical wall motion abnormality with dyskinesis of the LV segments  Size was at the upper limits of normal   Ejection fraction was estimated in the range of 35 % to 40 %  There was mild diffuse hypokinesis with regional variations  There was dyskinesis of the apical wall(s)  Doppler parameters were consistent with a reversible restrictive pattern, indicative of decreased left ventricular diastolic compliance and/or increased left atrial pressure (grade 3 diastolic dysfunction)      RIGHT VENTRICLE:  There is a pacemaker wire in the RV  The size was normal   Systolic function was normal      MITRAL VALVE:  There was mild regurgitation      TRICUSPID VALVE:  There was mild regurgitation  Follow cardiology consult  Continue Coumadin  Patient is high risk for embolic event    Permanent atrial fibrillation [facility])  Assessment & Plan  Patient was running bradycardic  Continue metoprolol-dose decreased to 25 mg  Continue Coumadin-patient was taking 3 mg, will increase the dose to 4 mg  INR is 1 73  Recheck INR  Stress test in 2008: EF 43%  As per recent echo:  Patient has apical thrombous  LEFT VENTRICLE:  There is a large size, 1x1 cm, apical LV thrombus  There is large size anterior, inferior, lateral and septal apical wall motion abnormality with dyskinesis of the LV segments  Size was at the upper limits of normal   Ejection fraction was estimated in the range of 35 % to 40 %  There was mild diffuse hypokinesis with regional variations    There was dyskinesis of the apical wall(s)  Doppler parameters were consistent with a reversible restrictive pattern, indicative of decreased left ventricular diastolic compliance and/or increased left atrial pressure (grade 3 diastolic dysfunction)      RIGHT VENTRICLE:  There is a pacemaker wire in the RV  The size was normal   Systolic function was normal      MITRAL VALVE:  There was mild regurgitation      TRICUSPID VALVE:  There was mild regurgitation  * Oropharyngeal dysphagia  Assessment & Plan  Unknown etiology  Follow dysphagia diet  Follow aspiration precaution  GI, speech and swallow recommendation appreciated-follow video swallow evaluation  As per GI note, patient does not want EGD  S/P esophagogram on 11/19:Limited examination revealing no gross hypopharyngeal or esophageal  abnormalities  Severe protein-calorie malnutrition (HCC)  Assessment & Plan  Malnutrition Findings:   Malnutrition type: Chronic illness  Degree of Malnutrition: Other severe protein calorie malnutrition(related to oropharyngeal dysphagia as evidenced by < 75% energy intake > 1 mo, severe muscle wasting noted at pectoralis, deltoid, interosseous muscles, moderate fat loss at orbitals and thoracic region, 16 1% wt loss x 8 mo (10/1/19 143#, 6/27/20 120#))    BMI Findings:  BMI Classifications: Underweight < 18 5     Body mass index is 17 11 kg/m²         Mixed obsessional thoughts and acts  Assessment & Plan  Severe nature  Continue current treatment and management    Physical deconditioning  Assessment & Plan  Patient is very malnourished and friable  High risk for pressure ulcer  Follow-up PT OT  Change position every 2 hours  Follow dietitian recommendation    Cardiomyopathy [facility])  Assessment & Plan  As per chart review  Status post pacemaker placement  Continue home medication  As per son, patient had history of triple bypass surgery in [facility] long time ago as well as recently change pacemaker battery in [place] few months ago  ECHO:  LEFT VENTRICLE:  There is a large size, 1x1 cm, apical LV thrombus  There is large size anterior, inferior, lateral and septal apical wall motion abnormality with dyskinesis of the LV segments  Size was at the upper limits of normal   Ejection fraction was estimated in the range of 35 % to 40 %  There was mild diffuse hypokinesis with regional variations  There was dyskinesis of the apical wall(s)  Doppler parameters were consistent with a reversible restrictive pattern, indicative of decreased left ventricular diastolic compliance and/or increased left atrial pressure (grade 3 diastolic dysfunction)      RIGHT VENTRICLE:  There is a pacemaker wire in the RV  The size was normal   Systolic function was normal      MITRAL VALVE:  There was mild regurgitation      TRICUSPID VALVE:  There was mild regurgitation      Bipolar 1 disorder, depressed ([address] )  Assessment & Plan  Continue home medication      Type 2 diabetes mellitus with diabetic neuropathy, without long-term current use of insulin [facility])  Assessment & Plan  No results found for: HGBA1C    Recent Labs     06/28/20  2124 06/29/20  0729 06/29/20  1047 06/29/20  1619   POCGLU 94 97 100 85       Blood Sugar Average: Last 72 hrs:  (P) 97 25 sliding scale  Continue gabapentin      Urinary retention  Assessment & Plan  History of urinary retention  Follow urinary retention protocol    Anxiety  Assessment & Plan  Continue home medication  Monitoring mental status    Generalized weakness  Assessment & Plan  Secondary to multiple factors  Continue nutritional supplement  Continue done  Will bolus 1 time  Monitor vital  Follow PT OT, speech evaluation    Failure to thrive in adult  Assessment & Plan  TSH is normal  Continue nutritional supplement  Continue diet  Speech and swallow, nutritional consult appreciated  Follow-up PT OT recommendation        VTE Pharmacologic Prophylaxis:   Pharmacologic: Warfarin (Coumadin)  Mechanical VTE Prophylaxis in Place: Yes    Patient Centered Rounds: I have performed bedside rounds with nursing staff today  Discussions with Specialists or Other Care Team Provider:  Yes with gastroenterology    Education and Discussions with Family / Patient:  Yes with patient    Time Spent for Care: 30 minutes  More than 50% of total time spent on counseling and coordination of care as described above  Current Length of Stay: 2 day(s)    Current Patient Status: Inpatient   Certification Statement: The patient will continue to require additional inpatient hospital stay due to Dysphagia, apical thrombus, AFib, cardiomyopathy    Discharge Plan / Estimated Discharge Date: To be determined      Code Status: Level 3 - DNAR and DNI      Subjective:   Seen and evaluated during the round  Patient denies any significant complaint  Objective:     Vitals:   Temp (24hrs), Av 7 °F (37 1 °C), Min:97 8 °F (36 6 °C), Max:99 4 °F (37 4 °C)    Temp:  [97 8 °F (36 6 °C)-99 4 °F (37 4 °C)] 99 3 °F (37 4 °C)  HR:  [64-91] 64  Resp:  [16-20] 20  BP: (125-160)/(71-85) 125/71  SpO2:  [97 %-99 %] 99 %  Body mass index is 17 11 kg/m²  Input and Output Summary (last 24 hours): Intake/Output Summary (Last 24 hours) at 2020 1653  Last data filed at 2020 1226  Gross per 24 hour   Intake 240 ml   Output 575 ml   Net -335 ml       Physical Exam:     Physical Exam   Constitutional: He is oriented to person, place, and time  He appears well-developed  No distress  HENT:   Mouth/Throat: Oropharynx is clear and moist  No oropharyngeal exudate  Eyes: Pupils are equal, round, and reactive to light  Conjunctivae and EOM are normal  No scleral icterus  Neck: Normal range of motion  Neck supple  No JVD present  Cardiovascular: Normal rate  Exam reveals no friction rub  Pulmonary/Chest: Effort normal and breath sounds normal  No stridor  No respiratory distress  He has no wheezes  Abdominal: Soft  Bowel sounds are normal  He exhibits no distension   There is no tenderness  Musculoskeletal: Normal range of motion  He exhibits no edema  Neurological: He is alert and oriented to person, place, and time  He displays normal reflexes  No cranial nerve deficit  Coordination normal    Skin: Skin is warm  Nursing note and vitals reviewed  Additional Data:     Labs:    Results from last 7 days   Lab Units 06/27/20  1324   WBC Thousand/uL 3 39*   HEMOGLOBIN g/dL 12 8   HEMATOCRIT % 37 3   PLATELETS Thousands/uL 111*   NEUTROS PCT % 55   LYMPHS PCT % 26   MONOS PCT % 18*   EOS PCT % 1     Results from last 7 days   Lab Units 06/29/20  1002   POTASSIUM mmol/L 3 9   CHLORIDE mmol/L 109*   CO2 mmol/L 29   BUN mg/dL 17   CREATININE mg/dL 1 05   CALCIUM mg/dL 9 1   ALK PHOS U/L 82   ALT U/L 13   AST U/L 18     Results from last 7 days   Lab Units 06/29/20  1002   INR  1 75*       * I Have Reviewed All Lab Data Listed Above  * Additional Pertinent Lab Tests Reviewed:  All Labs Within Last 24 Hours Reviewed      Last 24 Hours Medication List:     Current Facility-Administered Medications:  acetaminophen 650 mg Oral Q6H PRN Ag Mckinnon MD   ALPRAZolam 0 25 mg Oral BID PRN Miguel Rainey MD   aluminum-magnesium hydroxide-simethicone 30 mL Oral Q6H PRN Miguel Rainey MD   aspirin 81 mg Oral Daily Miguel Rainey MD   atorvastatin 10 mg Oral Daily Genevieve Mckinnon MD   docusate sodium 100 mg Oral BID Miguel Rainey MD   ergocalciferol 50,000 Units Oral Weekly Miguel Rainey MD   gabapentin 100 mg Oral HS Miguel Rainey MD   hydrALAZINE 10 mg Intravenous Once Maximilian Montemayor MD   insulin lispro 1-5 Units Subcutaneous TID Rob Stephenson MD   insulin lispro 1-5 Units Subcutaneous HS Miguel Rainey MD   levothyroxine 50 mcg Oral Daily Miguel Rainey MD   lisinopril 10 mg Oral Daily Miguel Rainey MD   metoprolol tartrate 25 mg Oral QAM Miguel Rainey MD   mirtazapine 15 mg Oral HS Genevieve Mckinnon MD   nitroglycerin 0 4 mg Sublingual Q5 Min PRN Evangelista Currie MD   ondansetron 4 mg Intravenous Q6H PRN Evangelista Currie MD   pantoprazole 40 mg Oral Daily Genevieve Mckinnon MD   potassium chloride 20 mEq Oral Daily Evangelista Currie MD   QUEtiapine 50 mg Oral HS Evangelista Currie MD   rOPINIRole 0 5 mg Oral HS Evangelista Currie MD   sertraline 25 mg Oral QAM Genevieve Mckinnon MD   sodium chloride (PF) 3 mL Intravenous Q1H PRN Cheryle Gutting, MD   sucralfate 1 g Oral BID Evangelista Currie MD   tamsulosin 0 4 mg Oral Daily With Dedra Valle MD   warfarin 4 mg Oral Daily (warfarin) Evangelista Currie MD        Today, Patient Was Seen By: Evangelista Currie MD    ** Please Note: Dragon 360 Dictation voice to text software may have been used in the creation of this document   **

## 2020-06-29 NOTE — ASSESSMENT & PLAN NOTE
TSH is normal  Continue nutritional supplement  Continue diet  Speech and swallow, nutritional consult appreciated  Follow-up PT OT recommendation

## 2020-06-29 NOTE — MALNUTRITION/BMI
This medical record reflects one or more clinical indicators suggestive of malnutrition  Malnutrition Findings:   Malnutrition type: Chronic illness  Degree of Malnutrition: Other severe protein calorie malnutrition(related to oropharyngeal dysphagia as evidenced by < 75% energy intake > 1 mo, severe muscle wasting noted at pectoralis, deltoid, interosseous muscles, moderate fat loss at orbitals and thoracic region, 16 1% wt loss x 8 mo (10/1/19 143#, 6/27/20 120#))  Malnutrition Characteristics: Fat loss, Muscle loss, Inadequate energy, Weight loss(Treated with: continuation of current diet order, adjust ensure enlive TID to vanilla per pt preference  Will provide diet ed to increase kcal and PRO intake as appropriate, pending ST evaluation )    BMI Findings:  BMI Classifications: Underweight < 18 5     Body mass index is 17 11 kg/m²  See Nutrition note dated 6/29/20 for additional details  Completed nutrition assessment is viewable in the nutrition documentation

## 2020-06-29 NOTE — DISCHARGE INSTR - OTHER ORDERS
Skin care plans:  1-Allevyn [employer] silicone bordered foam to the sacrum, uyen with P, date, peel back daily for skin assessment, reapply, change every 3 days or PRN with soilage or dislodgement  2-Elevate heels to offload pressure  3-Ehob cushion in chair when out of bed    4-Moisturize skin daily with skin nourishing cream   5-Turn/reposition q2h or when medically stable for pressure re-distribution on skin

## 2020-06-29 NOTE — SPEECH THERAPY NOTE
Speech-Language Pathology Bedside Swallow Evaluation    Patient Name: Dominic De La Torre    [id number] Date: 6/29/2020     Problem List  Principal Problem:    Oropharyngeal dysphagia  Active Problems:    Failure to thrive in adult    Generalized weakness    Permanent atrial fibrillation ([facility])    Anxiety    Urinary retention    Type 2 diabetes mellitus with diabetic neuropathy, without long-term current use of insulin ([facility])    Bipolar 1 disorder, depressed ([facility])    Cardiomyopathy ([facility])    Physical deconditioning    Mixed obsessional thoughts and acts      Past Medical History  Past Medical History:   Diagnosis Date    A-fib ([place] [address] )     Anemia     Bipolar 1 disorder ([place] [address] )     Cardiomegaly     Depression     Diabetes mellitus ([place] [address] )     Disease of thyroid gland     Epigastric pain     MI (myocardial infarction) ([facility]     Panic disorder (episodic paroxysmal anxiety)     SVT (supraventricular tachycardia) ([facility])     Urinary retention     Vertigo        Summary   Pt presented with s/s suggestive of mild-moderate oropharyngeal dysphagia  Symptoms or concerns included decreased bolus acceptance and decreased bolus propulsion as well as suspected pharyngeal swallow delay  Pt reports fear of choking, as well as fear of "taking in too much " He stated "It feels like the opening isn't big enough " Somewhat limited assessment due to pt participation  He had ample reasons to not want to eat/drink (not wanting to go to the bathroom, not liking water, not wanting to spoil his appetite), and question psychological component to his dysphagia  GI in to see pt, and following discussion pt declined EGD but agreed to video barium swallow study with speech, which can be arranged for tomorrow morning       Risk/s for Aspiration: Suspect low    Recommended Diet: puree/level 1 diet and thin liquids   Recommended Form of Meds: as tolerated   Aspiration precautions and swallowing strategies: upright posture, small bites/sips and alternating bites and sips      Current Medical Status per Dr Bj Vera H&P 6/27/2020  Gerardo Menendez is a 68 y o  male who presents with failure to thrive, difficulty swallowing  Patient is from [facility]  Patient was seen by his primary care in nursing home and found the patient was having dehydration, anxiety and dysphagia for that reason patient was sent to the hospital   Patient reports he is unable to eat something whenever he tried to eat it seems something stuck in the throat  Denies any chest pain, nausea, vomiting, diarrhea  Patient also reports he thinks he is going to die and this thinking process is coming back and back in his mind  But denies any suicidal ideation  Denies any hallucination, any auditory or visual hallucination  Current Precautions:  Fall, Aspiration     Special Studies:  CXR 6/27/2020 IMPRESSION:  No acute cardiopulmonary disease  Social/Education/Vocational Hx:  Pt lives in SNF/ECF    Swallow Information   Current Risks for Dysphagia & Aspiration: report of dysphagia  Current Diet: puree/level 1 diet and thin liquids   Baseline Diet: puree/level 1 diet and thin liquids      Baseline Assessment   Behavior/Cognition: alert, anxious with PO trials  Speech/Language Status: able to participate in conversation and able to follow commands  Patient Positioning: upright in bed  Pain Status/Interventions/Response to Interventions:  No report of or nonverbal indications of pain  Swallow Mechanism Exam  Facial: symmetrical  Labial: WFL  Lingual: WFL  Velum: limited elevation  Mandible: adequate ROM  Dentition: edentulous  Vocal quality:clear/adequate   Volitional Cough: strong/productive     Consistencies Assessed and Performance   Consistencies Administered: thin liquids, nectar thick and puree    Oral Stage: mild  Reduced bolus acceptance, prolonged A-P transfer  Suspect mildly reduced control of thin liquids      Pharyngeal Stage: mild  Swallow Mechanics: Swallowing initiation appeared mildly delayed  Laryngeal rise was palpated and judged to be within functional limits  No coughing, throat clearing, or change in respiratory status noted today  Transient vocal wetness noted with thin trials  Esophageal Concerns: question UES tightness    Summary and Recommendations (see above)    Results Reviewed with: patient, RN, MD and GI service       Treatment Recommended: to be determined following VBS    Patient Stated Goal:    Dysphagia LTG per SLP  -Patient will demonstrate optimum safety and efficacy of oral intake and swallowing function without overt s/sx of penetration or aspiration for the highest appropriate diet level  Short Term Goals:  -Pt will tolerate Dysphagia 1/pureed diet and thin liquid with no significant s/s oral or pharyngeal dysphagia across 1-3 diagnostic sessions     -Patient will comply with a Video/Modified Barium Swallow /instrumental swallow study for more complete assessment of anatomy and physiology of the swallowing skills, to assess efficacy of treatment techniques so as to best guide treatment plan

## 2020-06-29 NOTE — SOCIAL WORK
FRANCIS placed call to [facility]  to inquire of pts functional status prior to admission  As per [facility], pt occasionally needs assistance with ADL's or incontinence issues but pt is "stubborn" and often refuses help  Pt has received rehab services while at their facility in the past   As per [facility], they will accept pt back at time of d/c     Pt will need a referral for OP PT at time of d/c      CM left message for pts son, Vitaly Simental, to discuss discharge plan

## 2020-06-29 NOTE — ASSESSMENT & PLAN NOTE
Unknown etiology  Follow dysphagia diet  Follow aspiration precaution  GI, speech and swallow recommendation appreciated-follow video swallow evaluation  As per GI note, patient does not want EGD  S/P esophagogram on 11/19:Limited examination revealing no gross hypopharyngeal or esophageal  abnormalities

## 2020-06-29 NOTE — PLAN OF CARE
Problem: Potential for Falls  Goal: Patient will remain free of falls  Description  INTERVENTIONS:  - Assess patient frequently for physical needs  -  Identify cognitive and physical deficits and behaviors that affect risk of falls  -  [place] fall precautions as indicated by assessment   - Educate patient/family on patient safety including physical limitations  - Instruct patient to call for assistance with activity based on assessment  - Modify environment to reduce risk of injury  - Consider OT/PT consult to assist with strengthening/mobility  Outcome: Not Progressing     Problem: Prexisting or High Potential for Compromised Skin Integrity  Goal: Skin integrity is maintained or improved  Description  INTERVENTIONS:  - Identify patients at risk for skin breakdown  - Assess and monitor skin integrity  - Assess and monitor nutrition and hydration status  - Monitor labs   - Assess for incontinence   - Turn and reposition patient  - Assist with mobility/ambulation  - Relieve pressure over bony prominences  - Avoid friction and shearing  - Provide appropriate hygiene as needed including keeping skin clean and dry  - Evaluate need for skin moisturizer/barrier cream  - Collaborate with interdisciplinary team   - Patient/family teaching  - Consider wound care consult   Outcome: Not Progressing     Problem: Nutrition/Hydration-ADULT  Goal: Nutrient/Hydration intake appropriate for improving, restoring or maintaining nutritional needs  Description  Monitor and assess patient's nutrition/hydration status for malnutrition  Collaborate with interdisciplinary team and initiate plan and interventions as ordered  Monitor patient's weight and dietary intake as ordered or per policy  Utilize nutrition screening tool and intervene as necessary  Determine patient's food preferences and provide high-protein, high-caloric foods as appropriate       INTERVENTIONS:  - Monitor oral intake, urinary output, labs, and treatment plans  - Assess nutrition and hydration status and recommend course of action  - Evaluate amount of meals eaten  - Assist patient with eating if necessary   - Allow adequate time for meals  - Recommend/ encourage appropriate diets, oral nutritional supplements, and vitamin/mineral supplements  - Order, calculate, and assess calorie counts as needed  - Recommend, monitor, and adjust tube feedings and TPN/PPN based on assessed needs  - Assess need for intravenous fluids  - Provide specific nutrition/hydration education as appropriate  - Include patient/family/caregiver in decisions related to nutrition  Outcome: Not Progressing

## 2020-06-29 NOTE — ASSESSMENT & PLAN NOTE
Patient was running bradycardic  Continue metoprolol-dose decreased to 25 mg  Continue Coumadin-patient was taking 3 mg, will increase the dose to 4 mg  INR is 1 73  Recheck INR  Stress test in 2008: EF 43%  As per recent echo:  Patient has apical thrombous  LEFT VENTRICLE:  There is a large size, 1x1 cm, apical LV thrombus  There is large size anterior, inferior, lateral and septal apical wall motion abnormality with dyskinesis of the LV segments  Size was at the upper limits of normal   Ejection fraction was estimated in the range of 35 % to 40 %  There was mild diffuse hypokinesis with regional variations  There was dyskinesis of the apical wall(s)  Doppler parameters were consistent with a reversible restrictive pattern, indicative of decreased left ventricular diastolic compliance and/or increased left atrial pressure (grade 3 diastolic dysfunction)      RIGHT VENTRICLE:  There is a pacemaker wire in the RV  The size was normal   Systolic function was normal      MITRAL VALVE:  There was mild regurgitation      TRICUSPID VALVE:  There was mild regurgitation

## 2020-06-29 NOTE — ASSESSMENT & PLAN NOTE
As per chart review  Status post pacemaker placement  Continue home medication  As per son, patient had history of triple bypass surgery in [facility] long time ago as well as recently change pacemaker battery in [place] few months ago  ECHO:  LEFT VENTRICLE:  There is a large size, 1x1 cm, apical LV thrombus  There is large size anterior, inferior, lateral and septal apical wall motion abnormality with dyskinesis of the LV segments  Size was at the upper limits of normal   Ejection fraction was estimated in the range of 35 % to 40 %  There was mild diffuse hypokinesis with regional variations  There was dyskinesis of the apical wall(s)  Doppler parameters were consistent with a reversible restrictive pattern, indicative of decreased left ventricular diastolic compliance and/or increased left atrial pressure (grade 3 diastolic dysfunction)      RIGHT VENTRICLE:  There is a pacemaker wire in the RV  The size was normal   Systolic function was normal      MITRAL VALVE:  There was mild regurgitation      TRICUSPID VALVE:  There was mild regurgitation

## 2020-06-29 NOTE — CONSULTS
Consult Note - Wound   Edward Sox 68 y o  male MRN: [id number]  Unit/Bed#: -01 Encounter: [id number]      History and Present Illness: Patient has noted stage 1 area on the sacral present on admission   Called and dicussed with the RN and he is incontinent at times   Bilateral heels dry and intact   Please note the area in media of area pictured on admission  Discussed the plan of care for the area and orders to be placed  No other noted wounds   Assessment Findings:     1  Stage 1 - present on admission as noted in the chart           Skin care plans:  1-Hydraguard to bilateral sacrum, buttock and heels BID and PRN  2-Elevate heels to offload pressure  3-Ehob cushion in chair when out of bed  4-Moisturize skin daily with skin nourishing cream   5-Turn/reposition q2h or when medically stable for pressure re-distribution on skin  Vitals: Blood pressure 148/80, pulse 80, temperature 98 8 °F (37 1 °C), resp  rate 16, height 5' 10" (1 778 m), weight 54 1 kg (119 lb 4 3 oz), SpO2 99 %  ,Body mass index is 17 11 kg/m²  Wound 06/27/20 Pressure Injury Sacrum (Active)   Wound Image   6/27/2020  5:34 PM   Wound Description Pink 6/29/2020  9:03 AM   Staging Stage I 6/29/2020  9:03 AM   Carola-wound Assessment Clean;Dry; Intact 6/29/2020  9:03 AM   Wound Length (cm) 1 cm 6/27/2020  5:38 PM   Wound Width (cm) 1 cm 6/27/2020  5:38 PM   Calculated Wound Area (cm^2) 1 cm^2 6/27/2020  5:38 PM   Drainage Amount None 6/28/2020  9:00 AM   Dressing ( 6/29/2020  9:03 AM   Patient Tolerance Tolerated well 6/27/2020  5:38 PM   Dressing Status Clean;Dry; Intact 6/28/2020  9:00 PM     Wound care will follow weekly     Angelita Jaime RN BSN [facility] [place]

## 2020-06-29 NOTE — PLAN OF CARE
Problem: OCCUPATIONAL THERAPY ADULT  Goal: Performs self-care activities at highest level of function for planned discharge setting  See evaluation for individualized goals  Description  Treatment Interventions: ADL retraining, Functional transfer training, UE strengthening/ROM, Endurance training, Cognitive reorientation, Patient/family training, Equipment evaluation/education, Neuromuscular reeducation, Compensatory technique education, Continued evaluation, Energy conservation, Activityengagement          See flowsheet documentation for full assessment, interventions and recommendations  Note:   Limitation: Decreased ADL status, Decreased UE strength, Decreased Safe judgement during ADL, Decreased cognition, Decreased endurance, Decreased self-care trans, Decreased high-level ADLs  Prognosis: Good, Fair  Assessment: Pt is a 69 y/o M admitted to [address] [place] [address] 6/27/2020 d/t experiencing difficulty swallowing and failure to thrive  Dx: oropharyngeal dysphagia  Pt with significant PMHx impacting performance during functional tasks including: a-fib, anemia, bipolar 1 disorder, cardiomegaly, depression, DM II, MI, panic disorder, urinary retention, vertigo  Pt with inconstant reporting regarding PLOF and home set-up  Pt reprots he lives at an [facility] and gets around via manual w/c  Uncertain of Pt's performance during ADLs at this time  Will consult with CM regarding PLOF and home set-up as able  On evaluation, A&Ox3  Pt completing supine>sit @ S  Functional transfers @ Min A with use of RW for UB support  Decreased safety awareness and impulsiveness noted  UB dressing @ S after set-up  LB Dressing @ Min A with increased time  BUE ROM and MS WFL  Pt scoring 40/100 on Barthel Index   Pt presents with decrease activity tolerance, decrease standing balance, decrease performance during ADL tasks, decrease cognition, decrease safety awareness , decrease UB MS, decrease generalized strength, decrease activity engagement and decrease performance during functional transfers  Pt would benefit from continued acute OT services to address deficits as well as HHOT and return to [facility] with continued assistance/support upon d/c from [address] [place] [address]    Recommendation: (return to [facility] with continued assistance)  OT Discharge Recommendation: Home with skilled therapy

## 2020-06-30 ENCOUNTER — APPOINTMENT (INPATIENT)
Dept: RADIOLOGY | Facility: HOSPITAL | Age: 80
DRG: 391 | End: 2020-06-30
Payer: MEDICARE

## 2020-06-30 PROBLEM — I50.42 CHRONIC COMBINED SYSTOLIC AND DIASTOLIC CONGESTIVE HEART FAILURE (HCC): Status: ACTIVE | Noted: 2020-06-27

## 2020-06-30 LAB
APTT PPP: 124 SECONDS (ref 23–37)
APTT PPP: 58 SECONDS (ref 23–37)
APTT PPP: 61 SECONDS (ref 23–37)
APTT PPP: 91 SECONDS (ref 23–37)
GLUCOSE SERPL-MCNC: 100 MG/DL (ref 65–140)
GLUCOSE SERPL-MCNC: 103 MG/DL (ref 65–140)
GLUCOSE SERPL-MCNC: 119 MG/DL (ref 65–140)
GLUCOSE SERPL-MCNC: 83 MG/DL (ref 65–140)
INR PPP: 1.94 (ref 0.84–1.19)
PROTHROMBIN TIME: 22.3 SECONDS (ref 11.6–14.5)

## 2020-06-30 PROCEDURE — 85610 PROTHROMBIN TIME: CPT | Performed by: FAMILY MEDICINE

## 2020-06-30 PROCEDURE — 82948 REAGENT STRIP/BLOOD GLUCOSE: CPT

## 2020-06-30 PROCEDURE — 99232 SBSQ HOSP IP/OBS MODERATE 35: CPT | Performed by: FAMILY MEDICINE

## 2020-06-30 PROCEDURE — 74220 X-RAY XM ESOPHAGUS 1CNTRST: CPT

## 2020-06-30 PROCEDURE — 92611 MOTION FLUOROSCOPY/SWALLOW: CPT

## 2020-06-30 PROCEDURE — 85730 THROMBOPLASTIN TIME PARTIAL: CPT | Performed by: FAMILY MEDICINE

## 2020-06-30 PROCEDURE — 74230 X-RAY XM SWLNG FUNCJ C+: CPT

## 2020-06-30 RX ADMIN — ALPRAZOLAM 0.25 MG: 0.25 TABLET ORAL at 21:50

## 2020-06-30 RX ADMIN — DOCUSATE SODIUM 100 MG: 100 CAPSULE, LIQUID FILLED ORAL at 09:50

## 2020-06-30 RX ADMIN — HEPARIN SODIUM 1500 UNITS: 1000 INJECTION INTRAVENOUS; SUBCUTANEOUS at 09:42

## 2020-06-30 RX ADMIN — LISINOPRIL 10 MG: 10 TABLET ORAL at 09:53

## 2020-06-30 RX ADMIN — GABAPENTIN 100 MG: 100 CAPSULE ORAL at 21:50

## 2020-06-30 RX ADMIN — SERTRALINE HYDROCHLORIDE 25 MG: 25 TABLET ORAL at 09:50

## 2020-06-30 RX ADMIN — WARFARIN SODIUM 4 MG: 4 TABLET ORAL at 17:10

## 2020-06-30 RX ADMIN — ATORVASTATIN CALCIUM 10 MG: 10 TABLET, FILM COATED ORAL at 09:51

## 2020-06-30 RX ADMIN — TAMSULOSIN HYDROCHLORIDE 0.4 MG: 0.4 CAPSULE ORAL at 17:10

## 2020-06-30 RX ADMIN — ALPRAZOLAM 0.25 MG: 0.25 TABLET ORAL at 09:55

## 2020-06-30 RX ADMIN — SUCRALFATE 1 G: 1 TABLET ORAL at 09:50

## 2020-06-30 RX ADMIN — ROPINIROLE HYDROCHLORIDE 0.5 MG: 0.25 TABLET, FILM COATED ORAL at 21:50

## 2020-06-30 RX ADMIN — MIRTAZAPINE 15 MG: 15 TABLET, FILM COATED ORAL at 21:50

## 2020-06-30 RX ADMIN — PANTOPRAZOLE SODIUM 40 MG: 40 TABLET, DELAYED RELEASE ORAL at 09:50

## 2020-06-30 RX ADMIN — LEVOTHYROXINE SODIUM 50 MCG: 50 TABLET ORAL at 09:50

## 2020-06-30 RX ADMIN — POTASSIUM CHLORIDE 20 MEQ: 20 SOLUTION ORAL at 09:50

## 2020-06-30 RX ADMIN — QUETIAPINE FUMARATE 50 MG: 25 TABLET ORAL at 21:50

## 2020-06-30 RX ADMIN — SUCRALFATE 1 G: 1 TABLET ORAL at 17:10

## 2020-06-30 RX ADMIN — METOPROLOL TARTRATE 25 MG: 25 TABLET, FILM COATED ORAL at 09:52

## 2020-06-30 RX ADMIN — ASPIRIN 81 MG: 81 TABLET, COATED ORAL at 09:50

## 2020-06-30 NOTE — ASSESSMENT & PLAN NOTE
Secondary to multiple factors  Continue nutritional supplement  He did okay with breakfast he was supposed to get lunch when I saw him discussed with nursing there is no issues with dysphagia there is no physical findings a for now as he also refused EGD but the barium swallow esophagram was normal he is to continue puree diet  Suspect all this is secondary to his anxieties as he is scared to eat    He will need some rehab  Follow PT OT, speech evaluation

## 2020-06-30 NOTE — ASSESSMENT & PLAN NOTE
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

## 2020-06-30 NOTE — ASSESSMENT & PLAN NOTE
· I cannot find any records revealing that this is a chronic thrombus as discussed with Cardiology possibly had in the past   Had a 2D echo he does have an LV thrombus yesterday recommendation for at least for the thrombus 3 months anticoagulation but he does have atrial fibrillation  He is currently on heparin Coumadin bridge INR yesterday was 1 7 await INR today to see if echo discontinue heparin

## 2020-06-30 NOTE — PLAN OF CARE
Problem: Potential for Falls  Goal: Patient will remain free of falls  Description  INTERVENTIONS:  - Assess patient frequently for physical needs  -  Identify cognitive and physical deficits and behaviors that affect risk of falls  -  [place] fall precautions as indicated by assessment   - Educate patient/family on patient safety including physical limitations  - Instruct patient to call for assistance with activity based on assessment  - Modify environment to reduce risk of injury  - Consider OT/PT consult to assist with strengthening/mobility  Outcome: Progressing     Problem: Prexisting or High Potential for Compromised Skin Integrity  Goal: Skin integrity is maintained or improved  Description  INTERVENTIONS:  - Identify patients at risk for skin breakdown  - Assess and monitor skin integrity  - Assess and monitor nutrition and hydration status  - Monitor labs   - Assess for incontinence   - Turn and reposition patient  - Assist with mobility/ambulation  - Relieve pressure over bony prominences  - Avoid friction and shearing  - Provide appropriate hygiene as needed including keeping skin clean and dry  - Evaluate need for skin moisturizer/barrier cream  - Collaborate with interdisciplinary team   - Patient/family teaching  - Consider wound care consult   Outcome: Progressing     Problem: Nutrition/Hydration-ADULT  Goal: Nutrient/Hydration intake appropriate for improving, restoring or maintaining nutritional needs  Description  Monitor and assess patient's nutrition/hydration status for malnutrition  Collaborate with interdisciplinary team and initiate plan and interventions as ordered  Monitor patient's weight and dietary intake as ordered or per policy  Utilize nutrition screening tool and intervene as necessary  Determine patient's food preferences and provide high-protein, high-caloric foods as appropriate       INTERVENTIONS:  - Monitor oral intake, urinary output, labs, and treatment plans  - Assess nutrition and hydration status and recommend course of action  - Evaluate amount of meals eaten  - Assist patient with eating if necessary   - Allow adequate time for meals  - Recommend/ encourage appropriate diets, oral nutritional supplements, and vitamin/mineral supplements  - Order, calculate, and assess calorie counts as needed  - Recommend, monitor, and adjust tube feedings and TPN/PPN based on assessed needs  - Assess need for intravenous fluids  - Provide specific nutrition/hydration education as appropriate  - Include patient/family/caregiver in decisions related to nutrition  Outcome: Progressing

## 2020-06-30 NOTE — ASSESSMENT & PLAN NOTE
I suspect secondary to his increased anxiety as he is scared to eat but there is no with seeing of dysphagia he did okay with breakfast   Also he does not have any teeth so he will continue modified diet puree  I did ask Psychiatry to evaluate the patient as well  Follow dysphagia diet  Follow aspiration precaution  GI, speech and swallow recommendation appreciated-follow video swallow evaluation  As per GI note, patient does not want EGD  S/P esophagogram on 11/19:Limited examination revealing no gross hypopharyngeal or esophageal  abnormalities  He had another 1 today did not show any physical abnormalities

## 2020-06-30 NOTE — PROGRESS NOTES
Progress Note - [place] 1940, 68 y o  male MRN: [id number]    Unit/Bed#: -01 Encounter: [id number]    Primary Care Provider: Johanna Patel MD   Date and time admitted to hospital: 6/27/2020 12:53 PM        Left ventricular apical thrombus  Assessment & Plan  · I cannot find any records revealing that this is a chronic thrombus as discussed with Cardiology possibly had in the past   Had a 2D echo he does have an LV thrombus yesterday recommendation for at least for the thrombus 3 months anticoagulation but he does have atrial fibrillation  He is currently on heparin Coumadin bridge INR yesterday was 1 7 await INR today to see if echo discontinue heparin  Severe protein-calorie malnutrition (HCC)  Assessment & Plan  Malnutrition Findings:   Malnutrition type: Chronic illness  Degree of Malnutrition: Other severe protein calorie malnutrition(related to oropharyngeal dysphagia as evidenced by < 75% energy intake > 1 mo, severe muscle wasting noted at pectoralis, deltoid, interosseous muscles, moderate fat loss at orbitals and thoracic region, 16 1% wt loss x 8 mo (10/1/19 143#, 6/27/20 120#))    BMI Findings:  BMI Classifications: Underweight < 18 5     Body mass index is 17 94 kg/m²  Mixed obsessional thoughts and acts  Assessment & Plan  Severe nature  Continue current treatment and management    Physical deconditioning  Assessment & Plan  [facility] with skilled [facility] therapy  High risk for pressure ulcer  Follow-up PT OT  Change position every 2 hours  Follow dietitian recommendation    Chronic combined systolic and diastolic congestive heart failure ([address] )  Assessment & Plan  · 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)  Assessment & Plan  No results found for: HGBA1C    Recent Labs     06/29/20  1619 06/29/20  2153 06/30/20  0734 06/30/20  1139   POCGLU 85 84 83 119       Blood Sugar Average: Last 72 hrs:  (P) 96 [id number] sliding scale  Continue gabapentin      Urinary retention  Assessment & Plan  History of urinary retention  Follow urinary retention protocol    Anxiety  Assessment & Plan  Continue home medication  Uncontrolled that is causing him not T eat much as he is scared to eat  Consulted Psychiatry to see if any medication needs to be adjusted  Permanent atrial fibrillation 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  Secondary to LV thrombus he is currently on heparin to Coumadin bridge INR should be between 2-3 discussed with Cardiology as well  Await INR repeat today to see if echo discontinue the heparin currently Coumadin at 4 mg  Generalized weakness  Assessment & Plan  Secondary to multiple factors  Continue nutritional supplement  He did okay with breakfast he was supposed to get lunch when I saw him discussed with nursing there is no issues with dysphagia there is no physical findings a for now as he also refused EGD but the barium swallow esophagram was normal he is to continue puree diet  Suspect all this is secondary to his anxieties as he is scared to eat  He will need some rehab  Follow PT OT, speech evaluation    Failure to thrive in adult  Assessment & Plan  TSH is normal  Continue nutritional supplement  Continue diet  Speech and swallow, nutritional consult appreciated  Follow-up PT OT recommendation  Patient's poor p o   Intake with feeling of dysphagia suspect secondary to increased anxiety he did okay with breakfast today will continue to monitor    * Oropharyngeal dysphagia  Assessment & Plan  I suspect secondary to his increased anxiety as he is scared to eat but there is no with seeing of dysphagia he did okay with breakfast   Also he does not have any teeth so he will continue modified diet puree  I did ask Psychiatry to evaluate the patient as well  Follow dysphagia diet  Follow aspiration precaution  GI, speech and swallow recommendation appreciated-follow video swallow evaluation  As per GI note, patient does not want EGD  S/P esophagogram on :Limited examination revealing no gross hypopharyngeal or esophageal  abnormalities  He had another 1 today did not show any physical abnormalities  VTE Pharmacologic Prophylaxis:   Pharmacologic: Heparin Drip  Mechanical VTE Prophylaxis in Place: Yes    Patient Centered Rounds: I have performed bedside rounds with nursing staff today  Discussions with Specialists or Other Care Team Provider:  Discussed with Cardiology    Education and Discussions with Family / Patient:  Patient I will update family    Time Spent for Care: 30 minutes  More than 50% of total time spent on counseling and coordination of care as described above  Current Length of Stay: 3 day(s)    Current Patient Status: Inpatient   Certification Statement: The patient will continue to require additional inpatient hospital stay due to Therapeutic INR failure to thrive    Discharge Plan:  Rehab when medically cleared    Code Status: Level 3 - DNAR and DNI      Subjective:   Patient seen and examined he says he feels terrible today discussed with nursing he ate better today in the morning without any dysphagia  Also he stated he had some diarrhea on his left lower quadrant pain he does not have diarrhea he has loose stools  He does not have any dysuria  He states he is scared to eat       Objective:     Vitals:   Temp (24hrs), Av 2 °F (36 8 °C), Min:97 6 °F (36 4 °C), Max:99 3 °F (37 4 °C)    Temp:  [97 6 °F (36 4 °C)-99 3 °F (37 4 °C)] 98 1 °F (36 7 °C)  HR:  [57-72] 67  Resp: [16-20] 16  BP: (100-148)/(59-71) 139/62  SpO2:  [97 %-100 %] 100 %  Body mass index is 17 94 kg/m²  Input and Output Summary (last 24 hours): Intake/Output Summary (Last 24 hours) at 6/30/2020 1342  Last data filed at 6/30/2020 1235  Gross per 24 hour   Intake 640 ml   Output 325 ml   Net 315 ml       Physical Exam:     Physical Exam   Constitutional: He is oriented to person, place, and time  Malnourished   HENT:   Head: Normocephalic and atraumatic  Eyes: Pupils are equal, round, and reactive to light  EOM are normal    Neck: Normal range of motion  Cardiovascular: Normal rate, regular rhythm and normal heart sounds  Pulmonary/Chest: Effort normal and breath sounds normal  No stridor  No respiratory distress  He has no wheezes  Abdominal: Soft  Bowel sounds are normal  There is tenderness (Left pelvic area)  Musculoskeletal: Normal range of motion  He exhibits no edema  Neurological: He is alert and oriented to person, place, and time  He has normal reflexes  cranial nerve 2-12 are normal   Normal neurological exam   Skin: Skin is warm  Psychiatric: He has a normal mood and affect           Additional Data:     Labs:    Results from last 7 days   Lab Units 06/29/20  1752 06/27/20  1324   WBC Thousand/uL 5 93 3 39*   HEMOGLOBIN g/dL 12 6 12 8   HEMATOCRIT % 37 3 37 3   PLATELETS Thousands/uL 115* 111*   NEUTROS PCT %  --  55   LYMPHS PCT %  --  26   MONOS PCT %  --  18*   EOS PCT %  --  1     Results from last 7 days   Lab Units 06/29/20  1002   SODIUM mmol/L 144   POTASSIUM mmol/L 3 9   CHLORIDE mmol/L 109*   CO2 mmol/L 29   BUN mg/dL 17   CREATININE mg/dL 1 05   ANION GAP mmol/L 6   CALCIUM mg/dL 9 1   ALBUMIN g/dL 3 5   TOTAL BILIRUBIN mg/dL 0 46   ALK PHOS U/L 82   ALT U/L 13   AST U/L 18   GLUCOSE RANDOM mg/dL 108     Results from last 7 days   Lab Units 06/29/20  1752   INR  1 79*     Results from last 7 days   Lab Units 06/30/20  1139 06/30/20  0734 06/29/20  2153 06/29/20  1619 06/29/20  1047 06/29/20  0729 06/28/20  2124 06/28/20  1552 06/28/20  1032 06/28/20  0739 06/27/20  1831   POC GLUCOSE mg/dl 119 83 84 85 100 97 94 100 113 83 106                   * I Have Reviewed All Lab Data Listed Above    * Additional Pertinent Lab Tests Reviewed: No New Labs Available For Today    Imaging:    Imaging Reports Reviewed Today Include: esophagram   Imaging Personally Reviewed by Myself Includes:      Recent Cultures (last 7 days):           Last 24 Hours Medication List:     Current Facility-Administered Medications:  acetaminophen 650 mg Oral Q6H PRN Leoncio Mckinnon MD    ALPRAZolam 0 25 mg Oral BID PRN Madelon Pair, MD    aluminum-magnesium hydroxide-simethicone 30 mL Oral Q6H PRN Madelon Pair, MD    aspirin 81 mg Oral Daily Madelon Pair, MD    atorvastatin 10 mg Oral Daily Madelon Pair, MD    docusate sodium 100 mg Oral BID Madelon Pair, MD    ergocalciferol 50,000 Units Oral Weekly Madelon Pair, MD    gabapentin 100 mg Oral HS Madelon Pair, MD    heparin (porcine) 3-20 Units/kg/hr (Order-Specific) Intravenous Titrated Leandro Acosta MD Last Rate: 11 Units/kg/hr (06/30/20 0946)   heparin (porcine) 1,500 Units Intravenous Q1H PRN Madelon Pair, MD    heparin (porcine) 3,000 Units Intravenous Q1H PRN Madelon Pair, MD    insulin lispro 1-5 Units Subcutaneous HS Madelon MD Dave    insulin lispro 1-5 Units Subcutaneous TID AC Leandro Acosta, MD    levothyroxine 50 mcg Oral Daily Madelon Pair, MD    lisinopril 10 mg Oral Daily Madelon Pair, MD    metoprolol tartrate 25 mg Oral QAM Madelon Pair, MD    mirtazapine 15 mg Oral HS Madelon Pair, MD    nitroglycerin 0 4 mg Sublingual Q5 Min PRN Genevieve Mckinnon MD    ondansetron 4 mg Intravenous Q6H PRN Madelon Pair, MD    pantoprazole 40 mg Oral Daily Madelon Pair, MD    potassium chloride 20 mEq Oral Daily Madelon Pair, MD    QUEtiapine 50 mg Oral HS Madelon Pair, MD rOPINIRole 0 5 mg Oral HS Genevieve Mckinnon MD    sertraline 25 mg Oral QAM Genevieve Mckinnon MD    sodium chloride (PF) 3 mL Intravenous Q1H PRN Edward Lewis MD    sucralfate 1 g Oral BID Helder Grace MD    tamsulosin 0 4 mg Oral Daily With Thomas Hodge MD    warfarin 4 mg Oral Daily (warfarin) Helder Grace MD         Today, Patient Was Seen By: Kalli Restrepo MD    ** Please Note: Dictation voice to text software may have been used in the creation of this document   **

## 2020-06-30 NOTE — ASSESSMENT & PLAN NOTE
Continue home medication  Uncontrolled that is causing him not T eat much as he is scared to eat  Consulted Psychiatry to see if any medication needs to be adjusted

## 2020-06-30 NOTE — ASSESSMENT & PLAN NOTE
Malnutrition Findings:   Malnutrition type: Chronic illness  Degree of Malnutrition: Other severe protein calorie malnutrition(related to oropharyngeal dysphagia as evidenced by < 75% energy intake > 1 mo, severe muscle wasting noted at pectoralis, deltoid, interosseous muscles, moderate fat loss at orbitals and thoracic region, 16 1% wt loss x 8 mo (10/1/19 143#, 6/27/20 120#))    BMI Findings:  BMI Classifications: Underweight < 18 5     Body mass index is 17 94 kg/m²

## 2020-06-30 NOTE — PLAN OF CARE
Problem: Potential for Falls  Goal: Patient will remain free of falls  Description  INTERVENTIONS:  - Assess patient frequently for physical needs  -  Identify cognitive and physical deficits and behaviors that affect risk of falls  -  [place] fall precautions as indicated by assessment   - Educate patient/family on patient safety including physical limitations  - Instruct patient to call for assistance with activity based on assessment  - Modify environment to reduce risk of injury  - Consider OT/PT consult to assist with strengthening/mobility  Outcome: Progressing     Problem: Prexisting or High Potential for Compromised Skin Integrity  Goal: Skin integrity is maintained or improved  Description  INTERVENTIONS:  - Identify patients at risk for skin breakdown  - Assess and monitor skin integrity  - Assess and monitor nutrition and hydration status  - Monitor labs   - Assess for incontinence   - Turn and reposition patient  - Assist with mobility/ambulation  - Relieve pressure over bony prominences  - Avoid friction and shearing  - Provide appropriate hygiene as needed including keeping skin clean and dry  - Evaluate need for skin moisturizer/barrier cream  - Collaborate with interdisciplinary team   - Patient/family teaching  - Consider wound care consult   Outcome: Progressing     Problem: Nutrition/Hydration-ADULT  Goal: Nutrient/Hydration intake appropriate for improving, restoring or maintaining nutritional needs  Description  Monitor and assess patient's nutrition/hydration status for malnutrition  Collaborate with interdisciplinary team and initiate plan and interventions as ordered  Monitor patient's weight and dietary intake as ordered or per policy  Utilize nutrition screening tool and intervene as necessary  Determine patient's food preferences and provide high-protein, high-caloric foods as appropriate       INTERVENTIONS:  - Monitor oral intake, urinary output, labs, and treatment plans  - Assess nutrition and hydration status and recommend course of action  - Evaluate amount of meals eaten  - Assist patient with eating if necessary   - Allow adequate time for meals  - Recommend/ encourage appropriate diets, oral nutritional supplements, and vitamin/mineral supplements  - Order, calculate, and assess calorie counts as needed  - Recommend, monitor, and adjust tube feedings and TPN/PPN based on assessed needs  - Assess need for intravenous fluids  - Provide specific nutrition/hydration education as appropriate  - Include patient/family/caregiver in decisions related to nutrition  Outcome: Progressing

## 2020-06-30 NOTE — ASSESSMENT & PLAN NOTE
TSH is normal  Continue nutritional supplement  Continue diet  Speech and swallow, nutritional consult appreciated  Follow-up PT OT recommendation  Patient's poor p o   Intake with feeling of dysphagia suspect secondary to increased anxiety he did okay with breakfast today will continue to monitor

## 2020-06-30 NOTE — PROCEDURES
Speech Pathology Videofluoroscopic Swallow Study    Patient Name: Ofe ANGEL Date: 6/30/2020     Problem List  Principal Problem:    Oropharyngeal dysphagia  Active Problems:    Failure to thrive in adult    Generalized weakness    Permanent atrial fibrillation ([facility])    Anxiety    Urinary retention    Type 2 diabetes mellitus with diabetic neuropathy, without long-term current use of insulin ([facility])    Bipolar 1 disorder, depressed ([facility])    Cardiomyopathy ([facility])    Physical deconditioning    Mixed obsessional thoughts and acts    Severe protein-calorie malnutrition ([facility])    Left ventricular apical thrombus      Past Medical History  Past Medical History:   Diagnosis Date    A-fib ([facility] )     Anemia     Bipolar 1 disorder ([place] [address] )     Cardiomegaly     Depression     Diabetes mellitus ([place] [address] )     Disease of thyroid gland     Epigastric pain     MI (myocardial infarction) ([facility])     Panic disorder (episodic paroxysmal anxiety)     SVT (supraventricular tachycardia) ([facility])     Urinary retention     Vertigo        General Information;  Pt is a 68 y o  male with a PMH remarkable for failure to thrive, anxiety, dysphagia, Bipolar disorder, and malnutrition  Current concerns for dysphagia include pt report of fear of swallowing  He reported concern that his throat is too narrow and that it fills up too fast  He has concerns over lack of dentition and only eats pureed foods  Many of his complaints sounded esophageal, however following discussion with GI doctor, pt does not want to go under anesthesia for EGD  GI requested VBS to rule out oropharyngeal dysfunction  Barium swallow is also being considered  Pt was viewed in lateral position and was given trials of pureed, soft moist (apple sauce, mashed banana), and thin liquid  One half of a 13mm pill with puree was also administered  Oral stage:  Pt presented with moderate oral stage dysphagia    Pt has fear of swallowing soft solid foods and had difficulty even with soft/mashed banana due to lack of dentition  Bolus manipulation of purees was [facility], however A-P transfer was slow and mld-mod lingual residual was observed after swallow  Oral control appeared adequate with no gross premature spillage over the base of tongue  Pharyngeal stage:  Pt presented with mild-moderate pharyngeal dysphagia  Swallowing initiation was mildly delayed  Hyolaryngeal rise and anterior displacement were mildly reduced  Airway closure/protection appeared complete, with no penetration or aspiration present  Tongue base retraction appeared to be mildly reduced  Pharyngeal constriction appeared adequate  Management of food/liquid follows: All food and liquid passed through the pharynx with ease and safety with no penetration, aspiration or significant pharyngeal residue noted with materials administered today  Strategies and Efficacy: Double swallow encouraged which assisted with clearing mild pharyngeal residual     Aspiration Response and Efficacy: No aspiration seen on this study    Esophageal stage:  Esophageal screening was completed  Cricopharyngeal prominence observed but did not impede bolus flow  Assessment Summary:    Pt presents with mild-moderate oropharyngeal dysphagia characterized by decreased mastication, mildly delayed swallow initiation with mildly reduced laryngeal elevation  Cricopharyngeal prominence was observed, but did not obstruct bolus flow  Pt perseverated on stating he felt some thing "sticking right there" pointing to the base of his throat  Suspect heightened sensation to cricopharyngeal prominence, and increased fear of swallowing due to his anxiety       Recommendations:   Recommended Diet:  puree/level 1 diet and thin liquids   Recommended Form of Medications: crushed in puree   Aspiration precautions and compensatory swallowing strategies: upright posture, effortful swallow and alternating bites and sips, double swallow  Consider referral to: Pt was seen for a barium swallow as well, see report  SLP Dysphagia therapy recommended: ST will f/u briefly for patient education and strategy use to decrease fear of PO intake    Results Reviewed with: patient and RN   Pt Education: initiated  Pt and caregivers would benefit from/require continued education

## 2020-06-30 NOTE — ASSESSMENT & PLAN NOTE
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

## 2020-06-30 NOTE — ASSESSMENT & PLAN NOTE
No results found for: HGBA1C    Recent Labs     06/29/20  1619 06/29/20  2153 06/30/20  0734 06/30/20  1139   POCGLU 85 84 83 119       Blood Sugar Average: Last 72 hrs:  (P) 96 [id number] sliding scale  Continue gabapentin

## 2020-06-30 NOTE — NURSING NOTE
APTT 58, pt given 1500 units heparin IV & increased heparin gtt by 2 units/kg/hour to 11 units/kg/hour per orders  Next APTT scheduled for 1545  Verified with Mike GALLARDO

## 2020-06-30 NOTE — ASSESSMENT & PLAN NOTE
Return to [facility] with skilled physical therapy  High risk for pressure ulcer  Follow-up PT OT  Change position every 2 hours  Follow dietitian recommendation

## 2020-06-30 NOTE — NURSING NOTE
Psych consult placed to [facility]  They were not able to give me a physician name, stated that the  will assign the appropriate physician after consult placed

## 2020-06-30 NOTE — CONSULTS
Consultation - Cardiology   Kai Harry 68 y o  male MRN: [id number]  Unit/Bed#: -01 Encounter: [id number]    Assessment/Plan     Assessment:  Paroxysmal Afib- on coumadin  LV apical thrombus- INR 1 79 yesterday, noted previously last in 2016  CAD sp CABG in 2002, LAD stent in 2005  Dysphagia  Cardiomyopathy- noted to have EF 39% in 2010  Pacemaker with ICD- due to syncope in 2005  DM  Bipolar disorder  Failure to thrive  HTN    Plan:  - pt already on heparin drip  - INR goal 2-3  - no further cardiac work up from a cardiac standpoint    History of Present Illness   Physician Requesting Consult: You Teran MD  Reason for Consult / Principal Problem: cardiomyopathy, AFib  HPI: Kai Harry is a 68y o  year old male who presented to the ED for concerns with anorexia and dehydration  He currently lives in [address]  Due to patient underlying psychiatric condition pt is a poor historian  He cannot tell us much about his history  He does report his history of CABG but doesn't know who his cardiologist is  He doesn't remember if he has had significant work up since then  He reports no active chest pain or sob at this time  Per old records in 2016, patient has a history of ASCVD sp CABG in 2002  Pt also underwent stent to LAD in 2005  Pt had two Mis including an anterior wall with mural thomrbus with 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 requiring coumadin  EF in 2010 was apparently 39%  Patient also has a pacemaker with defibrillator in place due to syncopal episode in 2005  Inpatient consult to Cardiology  Consult performed by: Patria Gilman PA-C  Consult ordered by: Carlos Veloz MD          Review of Systems   Respiratory: Negative for chest tightness and shortness of breath  Cardiovascular: Negative for palpitations and leg swelling  Psychiatric/Behavioral: Positive for agitation  All other systems reviewed and are negative        Historical Information   Past Medical History:   Diagnosis Date    [facility]fib ([facility] )     Anemia     Bipolar 1 disorder ([place] [address] )     Cardiomegaly     Depression     Diabetes mellitus ([place] [address] )     Disease of thyroid gland     Epigastric pain     [facility])     Panic disorder (episodic paroxysmal anxiety)     SVT [facility]supraventricular tachycardia) ([facility])     Urinary retention     Vertigo      History reviewed  No pertinent surgical history  Social History     Substance and Sexual Activity   Alcohol Use Never    Frequency: Never    Binge frequency: Never     Social History     Substance and Sexual Activity   Drug Use Never     E-Cigarette/Vaping    E-Cigarette Use Never User      E-Cigarette/Vaping Substances    Nicotine No     THC No     CBD No     Flavoring No     Other No     Unknown No      Social History     Tobacco Use   Smoking Status Never Smoker   Smokeless Tobacco Never Used     Family History: unable to obtain    Meds/Allergies   all current active meds have been reviewed  No Known Allergies    Objective   Vitals: Blood pressure 100/59, pulse 68, temperature 97 7 °F (36 5 °C), resp  rate 16, height 5' 10" (1 778 m), weight 56 7 kg (125 lb), SpO2 100 %  Orthostatic Blood Pressures      Most Recent Value   Blood Pressure  100/59 filed at 06/30/2020 0002   Patient Position - Orthostatic VS  Lying - Orthostatic VS filed at 06/27/2020 1949            Intake/Output Summary (Last 24 hours) at 6/30/2020 0846  Last data filed at 6/30/2020 0501  Gross per 24 hour   Intake 560 ml   Output 625 ml   Net -65 ml       Invasive Devices     Peripheral Intravenous Line            Peripheral IV 06/27/20 Right Antecubital 2 days                Physical Exam   Constitutional: He appears well-developed  Appearing thin   Neck: Neck supple  No JVD present  Cardiovascular: Normal rate, regular rhythm and normal heart sounds  Exam reveals no friction rub  No murmur heard    Pulmonary/Chest: Effort normal and breath sounds normal  He has no wheezes  Abdominal: Soft  Musculoskeletal: He exhibits no edema  Neurological: He is alert  Skin: Skin is warm and dry  Capillary refill takes less than 2 seconds  Lab Results:   I have personally reviewed pertinent lab results  CBC with diff:   Results from last 7 days   Lab Units 06/29/20  1752   WBC Thousand/uL 5 93   RBC Million/uL 3 89   HEMOGLOBIN g/dL 12 6   HEMATOCRIT % 37 3   MCV fL 96   MCH pg 32 4   MCHC g/dL 33 8   RDW % 15 7*   MPV fL 9 2   PLATELETS Thousands/uL 115*     CMP:   Results from last 7 days   Lab Units 06/29/20  1002   SODIUM mmol/L 144   POTASSIUM mmol/L 3 9   CHLORIDE mmol/L 109*   CO2 mmol/L 29   BUN mg/dL 17   CREATININE mg/dL 1 05   CALCIUM mg/dL 9 1   AST U/L 18   ALT U/L 13   ALK PHOS U/L 82   EGFR ml/min/1 73sq m 68     Troponin:   0   Lab Value Date/Time    TROPONINI 0 04 06/27/2020 1324     BNP:   Results from last 7 days   Lab Units 06/29/20  1002   POTASSIUM mmol/L 3 9   CHLORIDE mmol/L 109*   CO2 mmol/L 29   BUN mg/dL 17   CREATININE mg/dL 1 05   CALCIUM mg/dL 9 1   EGFR ml/min/1 73sq m 68     Coags:   Results from last 7 days   Lab Units 06/30/20  0819  06/29/20  1752   PTT seconds 58*   < > 29   INR   --   --  1 79*    < > = values in this interval not displayed  TSH:   Results from last 7 days   Lab Units 06/27/20  1520   TSH 3RD GENERATON uIU/mL 0 552     Imaging: I have personally reviewed pertinent reports  Echo 6/29/20  LEFT VENTRICLE:  There is a large size, 1x1 cm, apical LV thrombus  There is large size anterior, inferior, lateral and septal apical wall motion abnormality with dyskinesis of the LV segments  Size was at the upper limits of normal   Ejection fraction was estimated in the range of 35 % to 40 %  There was mild diffuse hypokinesis with regional variations  There was dyskinesis of the apical wall(s)    Doppler parameters were consistent with a reversible restrictive pattern, indicative of decreased left ventricular diastolic compliance and/or increased left atrial pressure (grade 3 diastolic dysfunction  RIGHT VENTRICLE:  There is a pacemaker wire in the RV  The size was normal   Systolic function was normal   MITRAL VALVE:  There was mild regurgitation    TRICUSPID VALVE:  There was mild regurgitation    Per previous notes, EF in 2010 was 39%    EKG:   Normal sinus rhythm  Poor R wave progression  Abnormal ECG  No previous ECGs available  Confirmed by Bola Reynoso ([id number]) on 6/29/2020 9:27:22 AM

## 2020-07-01 LAB
APTT PPP: 61 SECONDS (ref 23–37)
GLUCOSE SERPL-MCNC: 102 MG/DL (ref 65–140)
GLUCOSE SERPL-MCNC: 118 MG/DL (ref 65–140)
GLUCOSE SERPL-MCNC: 92 MG/DL (ref 65–140)
INR PPP: 1.99 (ref 0.84–1.19)
PROTHROMBIN TIME: 22.8 SECONDS (ref 11.6–14.5)

## 2020-07-01 PROCEDURE — 85730 THROMBOPLASTIN TIME PARTIAL: CPT | Performed by: FAMILY MEDICINE

## 2020-07-01 PROCEDURE — 85610 PROTHROMBIN TIME: CPT | Performed by: FAMILY MEDICINE

## 2020-07-01 PROCEDURE — 82948 REAGENT STRIP/BLOOD GLUCOSE: CPT

## 2020-07-01 PROCEDURE — 99232 SBSQ HOSP IP/OBS MODERATE 35: CPT | Performed by: FAMILY MEDICINE

## 2020-07-01 PROCEDURE — NC001 PR NO CHARGE: Performed by: PSYCHIATRY & NEUROLOGY

## 2020-07-01 RX ORDER — ALPRAZOLAM 0.25 MG/1
0.25 TABLET ORAL 3 TIMES DAILY PRN
Status: DISCONTINUED | OUTPATIENT
Start: 2020-07-01 | End: 2020-07-09 | Stop reason: HOSPADM

## 2020-07-01 RX ADMIN — QUETIAPINE FUMARATE 50 MG: 25 TABLET ORAL at 21:25

## 2020-07-01 RX ADMIN — SERTRALINE HYDROCHLORIDE 25 MG: 25 TABLET ORAL at 08:51

## 2020-07-01 RX ADMIN — PANTOPRAZOLE SODIUM 40 MG: 40 TABLET, DELAYED RELEASE ORAL at 08:51

## 2020-07-01 RX ADMIN — MIRTAZAPINE 15 MG: 15 TABLET, FILM COATED ORAL at 21:25

## 2020-07-01 RX ADMIN — TAMSULOSIN HYDROCHLORIDE 0.4 MG: 0.4 CAPSULE ORAL at 17:06

## 2020-07-01 RX ADMIN — SUCRALFATE 1 G: 1 TABLET ORAL at 08:51

## 2020-07-01 RX ADMIN — ATORVASTATIN CALCIUM 10 MG: 10 TABLET, FILM COATED ORAL at 08:51

## 2020-07-01 RX ADMIN — GABAPENTIN 100 MG: 100 CAPSULE ORAL at 21:25

## 2020-07-01 RX ADMIN — HEPARIN SODIUM AND DEXTROSE 9 UNITS/KG/HR: 10000; 5 INJECTION INTRAVENOUS at 21:32

## 2020-07-01 RX ADMIN — ROPINIROLE HYDROCHLORIDE 0.5 MG: 0.25 TABLET, FILM COATED ORAL at 21:25

## 2020-07-01 RX ADMIN — DOCUSATE SODIUM 100 MG: 100 CAPSULE, LIQUID FILLED ORAL at 08:51

## 2020-07-01 RX ADMIN — LEVOTHYROXINE SODIUM 50 MCG: 50 TABLET ORAL at 08:51

## 2020-07-01 RX ADMIN — SUCRALFATE 1 G: 1 TABLET ORAL at 17:07

## 2020-07-01 RX ADMIN — DOCUSATE SODIUM 100 MG: 100 CAPSULE, LIQUID FILLED ORAL at 17:06

## 2020-07-01 RX ADMIN — WARFARIN SODIUM 4 MG: 4 TABLET ORAL at 17:06

## 2020-07-01 RX ADMIN — ASPIRIN 81 MG: 81 TABLET, COATED ORAL at 08:51

## 2020-07-01 RX ADMIN — ALPRAZOLAM 0.25 MG: 0.25 TABLET ORAL at 09:12

## 2020-07-01 RX ADMIN — POTASSIUM CHLORIDE 20 MEQ: 20 SOLUTION ORAL at 08:51

## 2020-07-01 NOTE — ASSESSMENT & PLAN NOTE
I suspect secondary to his increased anxiety as he is scared to eat but there is no with seeing of dysphagia he did okay with breakfast and he did okay with dinner without any issues and medications yesterday today he was complaining of after he is taking the med that he feeling this is taking sensation show discussed with nursing that will crush the meds     Also he does not have any teeth so he will continue modified diet puree  I did ask Psychiatry to evaluate the patient as well  Psychiatry evaluated yesterday I will not increase his nighttime Remeron secondary to some hypotension today  But I will add Xanax instead of p r n  Anxiety is p r n  30 minutes prior to food as he does feel anxious to eat  But he has been gaining weight while he is here continue pureed diet he is also does not have any dentures  Follow dysphagia diet  Follow aspiration precaution  GI, speech and swallow recommendation appreciated-follow video swallow evaluation  As per GI note, patient does not want EGD I discussed with patient again if he wants to have the EGD but he kind of went around the [place]  Will crushes pills  Will discuss with son will monitor for another day but there is no further workup warranted  S/P esophagogram on 11/19:Limited examination revealing no gross hypopharyngeal or esophageal  abnormalities  He had another 1 today did not show any physical abnormalities

## 2020-07-01 NOTE — PLAN OF CARE
Problem: Potential for Falls  Goal: Patient will remain free of falls  Description  INTERVENTIONS:  - Assess patient frequently for physical needs  -  Identify cognitive and physical deficits and behaviors that affect risk of falls  -  [place] fall precautions as indicated by assessment   - Educate patient/family on patient safety including physical limitations  - Instruct patient to call for assistance with activity based on assessment  - Modify environment to reduce risk of injury  - Consider OT/PT consult to assist with strengthening/mobility  Outcome: Progressing     Problem: Prexisting or High Potential for Compromised Skin Integrity  Goal: Skin integrity is maintained or improved  Description  INTERVENTIONS:  - Identify patients at risk for skin breakdown  - Assess and monitor skin integrity  - Assess and monitor nutrition and hydration status  - Monitor labs   - Assess for incontinence   - Turn and reposition patient  - Assist with mobility/ambulation  - Relieve pressure over bony prominences  - Avoid friction and shearing  - Provide appropriate hygiene as needed including keeping skin clean and dry  - Evaluate need for skin moisturizer/barrier cream  - Collaborate with interdisciplinary team   - Patient/family teaching  - Consider wound care consult   Outcome: Progressing     Problem: Nutrition/Hydration-ADULT  Goal: Nutrient/Hydration intake appropriate for improving, restoring or maintaining nutritional needs  Description  Monitor and assess patient's nutrition/hydration status for malnutrition  Collaborate with interdisciplinary team and initiate plan and interventions as ordered  Monitor patient's weight and dietary intake as ordered or per policy  Utilize nutrition screening tool and intervene as necessary  Determine patient's food preferences and provide high-protein, high-caloric foods as appropriate       INTERVENTIONS:  - Monitor oral intake, urinary output, labs, and treatment plans  - Assess nutrition and hydration status and recommend course of action  - Evaluate amount of meals eaten  - Assist patient with eating if necessary   - Allow adequate time for meals  - Recommend/ encourage appropriate diets, oral nutritional supplements, and vitamin/mineral supplements  - Order, calculate, and assess calorie counts as needed  - Recommend, monitor, and adjust tube feedings and TPN/PPN based on assessed needs  - Assess need for intravenous fluids  - Provide specific nutrition/hydration education as appropriate  - Include patient/family/caregiver in decisions related to nutrition  Outcome: Progressing

## 2020-07-01 NOTE — ASSESSMENT & PLAN NOTE
TSH is normal  Continue nutritional supplement  Continue diet  Speech and swallow, nutritional consult appreciated  Follow-up PT OT recommendation  Patient's poor p o  Intake with feeling of dysphagia suspect secondary to increased anxiety he did okay with breakfast and dinner yesterday that he was complaining that he feels that the pill is stuck sensation  Reviewed input done by Psychiatry  Will make some adjustments a but he did gain weight since he has been here

## 2020-07-01 NOTE — PROGRESS NOTES
Progress Note - Jessica Eller 1940, 68 y o  male MRN: [id number]    Unit/Bed#: -[facility] Encounter: [id number]    Primary Care Provider: Rickey Panda MD   Date and time admitted to hospital: 6/27/2020 12:53 PM        Left ventricular apical thrombus  Assessment & Plan  · I cannot find any records revealing that this is a chronic thrombus as discussed with Cardiology possibly had in the past   Had a 2D echo he does have an LV thrombus yesterday recommendation for at least for the thrombus 3 months anticoagulation but he does have atrial fibrillation  INR almost therapeutic at 1 99 continue heparin and Coumadin bridge till to or slightly above to keep INR 2-3 and then discontinue heparin continue Coumadin 4 mg p o  Daily  Severe protein-calorie malnutrition (HCC)  Assessment & Plan  Malnutrition Findings:   Malnutrition type: Chronic illness  Degree of Malnutrition: Other severe protein calorie malnutrition(related to oropharyngeal dysphagia as evidenced by < 75% energy intake > 1 mo, severe muscle wasting noted at pectoralis, deltoid, interosseous muscles, moderate fat loss at orbitals and thoracic region, 16 1% wt loss x 8 mo (10/1/19 143#, 6/27/20 120#))    BMI Findings:  BMI Classifications: Underweight < 18 5     Body mass index is 18 kg/m²  Mixed obsessional thoughts and acts  Assessment & Plan  Severe nature  Continue current treatment and management    Physical deconditioning  Assessment & Plan  Return to [facility] with skilled physical therapy  High risk for pressure ulcer  Follow-up PT OT  Change position every 2 hours  Follow dietitian recommendation await recommendation by physical therapy but he will need some rehab    Chronic combined systolic and diastolic congestive heart failure ([address] )  Assessment & Plan  · 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   As reviewed per records  · He is compensated  He is actually failure to thrive no need for any diuretics    He is EF is 35-40% with grade 3 diastolic dysfunction on the echocardiogram   Discontinue lisinopril secondary to hypotension continue metoprolol the decreased dose to 12 5 mg p o  Daily and that for atrial fibrillation  As well    Bipolar 1 disorder, depressed ([address] )  Assessment & Plan  Continue home medication      Type 2 diabetes mellitus with diabetic neuropathy, without long-term current use of insulin [facility])  Assessment & Plan  No results found for: HGBA1C    Recent Labs     06/30/20  1631 06/30/20  2105 07/01/20  0718 07/01/20  1100   POCGLU 103 100 92 118       Blood Sugar Average: Last 72 hrs:  (P) 37 [id number] sliding scale  Continue gabapentin  Sugars controlled discontinue Accu-Cheks discontinue sliding scale    Urinary retention  Assessment & Plan  History of urinary retention  Follow urinary retention protocol  No issues    Anxiety  Assessment & Plan  Continue home medication  Uncontrolled that is causing him not T eat much as he is scared to eat  Consulted Psychiatry to see if any medication needs to be adjusted  Reviewed recommendation all actually place Xanax 30 minutes prior to each meal to see if that helps    Permanent atrial fibrillation [facility])  Assessment & Plan    Secondary to LV thrombus he is currently on heparin to Coumadin bridge INR should be between 2-3 discussed with Cardiology as well  INR is coming up to 1 9 continue heparin and Coumadin bridge till it to and then I will discontinue heparin continue Coumadin 4 mg daily INR tomorrow  Generalized weakness  Assessment & Plan  Secondary to multiple factors  Continue nutritional supplement  He did okay with breakfast he was supposed to get lunch when I saw him discussed with nursing there is no issues with dysphagia there is no physical findings a for now as he also refused EGD but the barium swallow esophagram was normal he is to continue puree diet  Suspect all this is secondary to his anxieties as he is scared to eat    He will need some rehab  Follow PT OT, speech evaluation    Failure to thrive in adult  Assessment & Plan  TSH is normal  Continue nutritional supplement  Continue diet  Speech and swallow, nutritional consult appreciated  Follow-up PT OT recommendation  Patient's poor p o  Intake with feeling of dysphagia suspect secondary to increased anxiety he did okay with breakfast and dinner yesterday that he was complaining that he feels that the pill is stuck sensation  Reviewed input done by Psychiatry  Will make some adjustments a but he did gain weight since he has been here  * Oropharyngeal dysphagia  Assessment & Plan  I suspect secondary to his increased anxiety as he is scared to eat but there is no with seeing of dysphagia he did okay with breakfast and he did okay with dinner without any issues and medications yesterday today he was complaining of after he is taking the med that he feeling this is taking sensation show discussed with nursing that will crush the meds     Also he does not have any teeth so he will continue modified diet puree  I did ask Psychiatry to evaluate the patient as well  Psychiatry evaluated yesterday I will not increase his nighttime Remeron secondary to some hypotension today  But I will add Xanax instead of p r n  Anxiety is p r n  30 minutes prior to food as he does feel anxious to eat  But he has been gaining weight while he is here continue pureed diet he is also does not have any dentures  Follow dysphagia diet  Follow aspiration precaution  GI, speech and swallow recommendation appreciated-follow video swallow evaluation  As per GI note, patient does not want EGD I discussed with patient again if he wants to have the EGD but he kind of went around the [place]  Will crushes pills  Will discuss with son will monitor for another day but there is no further workup warranted  S/P esophagogram on 11/19:Limited examination revealing no gross hypopharyngeal or esophageal  abnormalities    He had another 1 today did not show any physical abnormalities  VTE Pharmacologic Prophylaxis:   Pharmacologic: Heparin Drip  Mechanical VTE Prophylaxis in Place: Yes    Patient Centered Rounds: I have performed bedside rounds with nursing staff today  Discussions with Specialists or Other Care Team Provider:  Discussed with nursing    Education and Discussions with Family / Patient:  I discussed with patient a left message for patient's son to call me back    Time Spent for Care: 30 minutes  More than 50% of total time spent on counseling and coordination of care as described above  Current Length of Stay: 4 day(s)    Current Patient Status: Inpatient   Certification Statement: The patient will continue to require additional inpatient hospital stay due to Subtherapeutic INR    Discharge Plan:  Patient is ready to be discharged to rehab in about 24-48 hours if nothing goes backwards    Code Status: Level 3 - DNAR and DNI      Subjective:   Patient seen and examined he is very focused that he still has the feeling of the pill being stuck but he refuses any further workup apparently has been eating breakfast without any issues reporting a did without any issues no issues reported taking any medications last night  He feels very anxious and he tries to go around conversation of EGD  No chest pain or shortness of breath he did have a bowel movement  No abdominal pain    Objective:     Vitals:   Temp (24hrs), Av 2 °F (36 8 °C), Min:97 5 °F (36 4 °C), Max:99 °F (37 2 °C)    Temp:  [97 5 °F (36 4 °C)-99 °F (37 2 °C)] 97 5 °F (36 4 °C)  HR:  [55-67] 58  Resp:  [16-18] 16  BP: ()/(44-62) 96/48  SpO2:  [97 %-100 %] 99 %  Body mass index is 18 kg/m²  Input and Output Summary (last 24 hours):        Intake/Output Summary (Last 24 hours) at 2020 1121  Last data filed at 2020 0900  Gross per 24 hour   Intake 535 56 ml   Output 100 ml   Net 435 56 ml       Physical Exam:     Physical Exam   Constitutional: He is oriented to person, place, and time  Cachectic   HENT:   Head: Normocephalic and atraumatic  Eyes: Pupils are equal, round, and reactive to light  EOM are normal    Neck: Normal range of motion  Cardiovascular: Normal rate, regular rhythm and normal heart sounds  Pulmonary/Chest: Effort normal and breath sounds normal  No stridor  No respiratory distress  He has no wheezes  Abdominal: Soft  Bowel sounds are normal  He exhibits no distension  There is no tenderness  There is no guarding  Musculoskeletal: Normal range of motion  He exhibits no edema  Neurological: He is alert and oriented to person, place, and time  He has normal reflexes  cranial nerve 2-12 are normal   Normal neurological exam   Skin: Skin is warm  Psychiatric: He has a normal mood and affect  Additional Data:     Labs:    Results from last 7 days   Lab Units 06/29/20  1752 06/27/20  1324   WBC Thousand/uL 5 93 3 39*   HEMOGLOBIN g/dL 12 6 12 8   HEMATOCRIT % 37 3 37 3   PLATELETS Thousands/uL 115* 111*   NEUTROS PCT %  --  55   LYMPHS PCT %  --  26   MONOS PCT %  --  18*   EOS PCT %  --  1     Results from last 7 days   Lab Units 06/29/20  1002   SODIUM mmol/L 144   POTASSIUM mmol/L 3 9   CHLORIDE mmol/L 109*   CO2 mmol/L 29   BUN mg/dL 17   CREATININE mg/dL 1 05   ANION GAP mmol/L 6   CALCIUM mg/dL 9 1   ALBUMIN g/dL 3 5   TOTAL BILIRUBIN mg/dL 0 46   ALK PHOS U/L 82   ALT U/L 13   AST U/L 18   GLUCOSE RANDOM mg/dL 108     Results from last 7 days   Lab Units 07/01/20  0434   INR  1 99*     Results from last 7 days   Lab Units 07/01/20  1100 07/01/20  0718 06/30/20  2105 06/30/20  1631 06/30/20  1139 06/30/20  0734 06/29/20  2153 06/29/20  1619 06/29/20  1047 06/29/20  0729 06/28/20  2124 06/28/20  1552   POC GLUCOSE mg/dl 118 92 100 103 119 83 84 85 100 97 94 100                   * I Have Reviewed All Lab Data Listed Above  * Additional Pertinent Lab Tests Reviewed:  All Labs Within Last 24 Hours Reviewed    Imaging:    Imaging Reports Reviewed Today Include: none today   Imaging Personally Reviewed by Myself Includes:      Recent Cultures (last 7 days):           Last 24 Hours Medication List:     Current Facility-Administered Medications:  acetaminophen 650 mg Oral Q6H PRN Juwan Sullivan MD    ALPRAZolam 0 25 mg Oral TID PRN Laura Cartagena MD    aluminum-magnesium hydroxide-simethicone 30 mL Oral Q6H PRN Juwan Sullivan MD    aspirin 81 mg Oral Daily Juwan Sullivan MD    atorvastatin 10 mg Oral Daily Juwan Sullivan MD    docusate sodium 100 mg Oral BID Juwan Sullivan MD    ergocalciferol 50,000 Units Oral Weekly Juwan Sullivan MD    gabapentin 100 mg Oral HS Juwan Sullivan MD    heparin (porcine) 3-20 Units/kg/hr (Order-Specific) Intravenous Titrated Juwan Sullivan MD Last Rate: 9 Units/kg/hr (07/01/20 0542)   heparin (porcine) 1,500 Units Intravenous Q1H PRN Juwan Sullivan MD    heparin (porcine) 3,000 Units Intravenous Q1H PRN Juwan Sullivan MD    levothyroxine 50 mcg Oral Daily Juwan Sullivan MD    [START ON 7/2/2020] metoprolol tartrate 12 5 mg Oral QAM Laura Cartagena MD    mirtazapine 15 mg Oral HS Genevieve Mckinnon MD    nitroglycerin 0 4 mg Sublingual Q5 Min PRN Genevieve Mckinnon MD    ondansetron 4 mg Intravenous Q6H PRN Aj Mckinnon MD    pantoprazole 40 mg Oral Daily Juwan Sullivan MD    potassium chloride 20 mEq Oral Daily Juwan Sullivan MD    QUEtiapine 50 mg Oral HS Juwan Sullivan MD    rOPINIRole 0 5 mg Oral HS Juwan Sullivan MD    sertraline 25 mg Oral QAM Juwan Sullivan MD    sodium chloride (PF) 3 mL Intravenous Q1H PRN John Young MD    sucralfate 1 g Oral BID Juwan Sullivan MD    tamsulosin 0 4 mg Oral Daily With Maurizio Graham MD    warfarin 4 mg Oral Daily (warfarin) Juwan Sullivan MD         Today, Patient Was Seen By: Laura Cartagena MD    ** Please Note: Dictation voice to text software may have been used in the creation of this document  **

## 2020-07-01 NOTE — ASSESSMENT & PLAN NOTE
Return to [facility] with skilled physical therapy  High risk for pressure ulcer  Follow-up PT OT  Change position every 2 hours  Follow dietitian recommendation await recommendation by physical therapy but he will need some rehab

## 2020-07-01 NOTE — ASSESSMENT & PLAN NOTE
Secondary to LV thrombus he is currently on heparin to Coumadin bridge INR should be between 2-3 discussed with Cardiology as well  INR is coming up to 1 9 continue heparin and Coumadin bridge till it to and then I will discontinue heparin continue Coumadin 4 mg daily INR tomorrow

## 2020-07-01 NOTE — CONSULTS
TELEPSYCHIATRY CONSULTATION NOTE    Date/Time:  07/01/2020 0149A  Name:   Randee Murguia  YOB: 1940  Patient Location: [address]  Provider Location: [place]  ___________________________________________________________________    This evaluation was conducted via telepsychiatry with the assistance of onsite staff    Discussed with staff: nurse Driss Castillo  SOURCES OF INFORMATION:  Patient, medical record, son    CHIEF COMPLAINT:  failure to thrive  HISTORY OF PRESENT ILLNESS:  79-year-old male with history of affective illness, obsessive thoughts, and psychiatric hospitalizations, presented on referral from PCP for failure to thrive  Patient admitted to medical unit 6/27/20, has exhibited dysphagia, GI pain, poor PO intake  Primary team concerned that patients anxiety may be contributing to presentation  Psychiatry consulted to determine psychiatric management  On my evaluation, patient was organized and without s/s of psychosis, though vague, impoverished, and minimally cooperative  Patient often answers question I cant say right now   Patient reports history of "depression"vaguely described syndrome consisting of dysphoria, anxiety, insomnia, passive suicidal ideation, and appetite fluctuationsfor many years  Patient denies associated suicidal ideation, homicidal ideation, auditory hallucinations, and visual hallucinations  He is unable to describe anxiety, obsessional thinking, and bipolar disorder that has been reported  Patient reports condition coexisted with heavy use of alcohol in the past, though he has not drunk for many years  Patient reports good adherence with medications though does not think they are working  Patient reports condition has been stably bad in the context of medical illness, reports he feels sick and wants to die a natural death, though denies all SI/HI  He does not see need for inpatient psychiatric hospitalization         COLLATERAL: I spoke with Claudetta Schilling, patient's son [phone])  Son did not wish to speak due to late hour  Suicidality/Self-harm: 2 reported past aborted suicide attemptspatient cannot describe  Homicidality/Violence: No reported past violence  Access to Weapons: Patient is in nursing home  Legal History: Unable to obtain information about legal history  PSYCHIATRIC HISTORY:  ?2 past hospitalizationspatient cannot describe  SUBSTANCE USE HISTORY:  Past heavy use of alcohol for many years  History of rehab  MEDICAL HISTORY:  Afib, L ventricular thrombosis, dysphagia, cardiomyopathy, DM2 with neuropathy, urinary retention, failure to thrive, s/p MI  S/p cABGx3, pacemaker placement  MEDICATIONS:    Mirtazapine 15 mg daily at bedtime  Quetiapine 50 mg daily at bedtime  Sertraline  25 mg daily  Alprazolam 0 25 mg 2 x daily PRN    Also: Aspirin, atorvastatin, Colace, D2, gabapentin, insulin, Synthroid, metoprolol, pantoprazole, KCl, Requip, carafate, Flomax, Coumadin     ALLERGIES:  NKDA  SOCIAL HISTORY:     Housing:  Domiciled in nursing home in recent years  FAMILY PSYCHIATRIC HISTORY:  No reported family psychiatric history        MENTAL STATUS EXAMINATION:   Appearance:  unkempt, thin, appears older than stated age   Behavior:  psychomotor retardation   Interrelatedness:  distant   Speech:  minimal, slow, latent, mumbling, soft   Thought Process:  impoverished, vague   Thought Content:  no suicidal ideation, no homicidal ideation, passive SI "want to die a natural death"   Perception:  no auditory hallucinations   Mood:  depressed   Affect:  subdued, constricted   Cognition:  grossly intact   Impulse Control:  adequate   Insight:  limited   Judgment:  impaired    EXAM/LABS/IMAGING:   Vital Signs: AVSS   Chemistry Panel:  Unremarkable   CBC:  Unremarkable   TSH:  WNL   Alcohol/UDS:  Unavailable    ASSESSMENT:  37-year-old male with history of affective illness, obsessive thoughts, and psychiatric hospitalizations, for failure to thrive  Exam is limited due to patients inability to provide history and lack of collateral   Patients depression (dysphoria, latent speech, psychomotor retardation, passive SI) appears prominent at this time beyond any concerns about obsessive thinking or anxiety  He is amenable to medication changes to optimize psychiatric management but does not see need for inpatient psychiatric hospitalization  He is fully oriented, accepting of recommended treatment, and demonstrates capacity to make his own medical decisions  DIAGNOSIS:  Depressive disorder, unspecified F32 9    RECOMMENDATIONS:  --Psych Medications: Given concerns about anxiety and depression impairing his motivation to eat, would recommend the followin) Increase mirtazpine to 30 mg QHS for depression/appetite increase  2) Give patient PRN Xanax 30 mins prior to mealtimes to test whether it improves his PO intake  3) Otherwise continue confirmed psychiatric medications as written  --Disposition: Should above strategies and medical management improve to the point where he is medically clear, there is no acute risk necessitating inpatient psychiatric treatment  This said, if failure to thrive persists, there may be indication for inpatient psychiatric/medical psychiatric hospitalization to further treat his depression  --Collateral Info: Obtain collateral information from family to further understand mental health history, establish risk and treatment goals  --Precautions: No active SI, no indication for 1:1        Please re-consult psychiatry in 48 hours to determine effect of above intervention and to advise on further management        Thank you for this consult      Harjinder Preciado MD  Psychiatrist

## 2020-07-01 NOTE — PLAN OF CARE
Problem: Potential for Falls  Goal: Patient will remain free of falls  Description  INTERVENTIONS:  - Assess patient frequently for physical needs  -  Identify cognitive and physical deficits and behaviors that affect risk of falls  -  [place] fall precautions as indicated by assessment   - Educate patient/family on patient safety including physical limitations  - Instruct patient to call for assistance with activity based on assessment  - Modify environment to reduce risk of injury  - Consider OT/PT consult to assist with strengthening/mobility  Outcome: Progressing     Problem: Prexisting or High Potential for Compromised Skin Integrity  Goal: Skin integrity is maintained or improved  Description  INTERVENTIONS:  - Identify patients at risk for skin breakdown  - Assess and monitor skin integrity  - Assess and monitor nutrition and hydration status  - Monitor labs   - Assess for incontinence   - Turn and reposition patient  - Assist with mobility/ambulation  - Relieve pressure over bony prominences  - Avoid friction and shearing  - Provide appropriate hygiene as needed including keeping skin clean and dry  - Evaluate need for skin moisturizer/barrier cream  - Collaborate with interdisciplinary team   - Patient/family teaching  - Consider wound care consult   Outcome: Progressing     Problem: Nutrition/Hydration-ADULT  Goal: Nutrient/Hydration intake appropriate for improving, restoring or maintaining nutritional needs  Description  Monitor and assess patient's nutrition/hydration status for malnutrition  Collaborate with interdisciplinary team and initiate plan and interventions as ordered  Monitor patient's weight and dietary intake as ordered or per policy  Utilize nutrition screening tool and intervene as necessary  Determine patient's food preferences and provide high-protein, high-caloric foods as appropriate       INTERVENTIONS:  - Monitor oral intake, urinary output, labs, and treatment plans  - Assess nutrition and hydration status and recommend course of action  - Evaluate amount of meals eaten  - Assist patient with eating if necessary   - Allow adequate time for meals  - Recommend/ encourage appropriate diets, oral nutritional supplements, and vitamin/mineral supplements  - Order, calculate, and assess calorie counts as needed  - Recommend, monitor, and adjust tube feedings and TPN/PPN based on assessed needs  - Assess need for intravenous fluids  - Provide specific nutrition/hydration education as appropriate  - Include patient/family/caregiver in decisions related to nutrition  Outcome: Progressing

## 2020-07-01 NOTE — CASE MANAGEMENT
MD anticipates patient will be ready for dc tomorrow  Remains on Heparin infusion/ bridging to Coumadin  CM called [place] [phone] Director of Nursing at [facility] in regards to plan dc tomorrow  Amina informed CM that they will not accept patient back until our provider Malorie Del Cid MD his PCP   [phone] as he sent Jelani Alvarado to the Hospital  Updated therapy notes were faxed to [facility]   - Currently on a dysphagia diet as he does not have dentures, [address] is aware, at the home he is on a regular diet and per [address] will eat what he wants  - They will need OP PT prescriptions for him upon dc  MD is aware of the above and she will reach out to PCP  Will continue to follow  Anticipate WC transport  2:28: Spoke to patient at the bedside, reviewed DC IMM, patient acknowledged, he does not feel he is ready to return to the facility as he is having a difficult time swallowing and eating  Assurance provided  Will continue to follow at this time CM, noting therapy recommendations are return to [facility] with OP PT prescription  CM will need to follow up with Director of Nursing 7/2 for ability to return back to [facility] after MD discusses his case with his PCP

## 2020-07-01 NOTE — ASSESSMENT & PLAN NOTE
Malnutrition Findings:   Malnutrition type: Chronic illness  Degree of Malnutrition: Other severe protein calorie malnutrition(related to oropharyngeal dysphagia as evidenced by < 75% energy intake > 1 mo, severe muscle wasting noted at pectoralis, deltoid, interosseous muscles, moderate fat loss at orbitals and thoracic region, 16 1% wt loss x 8 mo (10/1/19 143#, 6/27/20 120#))    BMI Findings:  BMI Classifications: Underweight < 18 5     Body mass index is 18 kg/m²

## 2020-07-01 NOTE — ASSESSMENT & PLAN NOTE
No results found for: HGBA1C    Recent Labs     06/30/20  1631 06/30/20  2105 07/01/20  0718 07/01/20  1100   POCGLU 103 100 92 118       Blood Sugar Average: Last 72 hrs:  (P) 97 [id number] sliding scale  Continue gabapentin  Sugars controlled discontinue Accu-Cheks discontinue sliding scale

## 2020-07-01 NOTE — ASSESSMENT & PLAN NOTE
· 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   As reviewed per records  · He is compensated  He is actually failure to thrive no need for any diuretics  He is EF is 35-40% with grade 3 diastolic dysfunction on the echocardiogram   Discontinue lisinopril secondary to hypotension continue metoprolol the decreased dose to 12 5 mg p o  Daily and that for atrial fibrillation    As well

## 2020-07-01 NOTE — ASSESSMENT & PLAN NOTE
Continue home medication  Uncontrolled that is causing him not T eat much as he is scared to eat  Consulted Psychiatry to see if any medication needs to be adjusted    Reviewed recommendation all actually place Xanax 30 minutes prior to each meal to see if that helps

## 2020-07-01 NOTE — ASSESSMENT & PLAN NOTE
· I cannot find any records revealing that this is a chronic thrombus as discussed with Cardiology possibly had in the past   Had a 2D echo he does have an LV thrombus yesterday recommendation for at least for the thrombus 3 months anticoagulation but he does have atrial fibrillation  INR almost therapeutic at 1 99 continue heparin and Coumadin bridge till to or slightly above to keep INR 2-3 and then discontinue heparin continue Coumadin 4 mg p o  Daily

## 2020-07-02 LAB
ANION GAP SERPL CALCULATED.3IONS-SCNC: 7 MMOL/L (ref 4–13)
APTT PPP: 51 SECONDS (ref 23–37)
APTT PPP: 56 SECONDS (ref 23–37)
APTT PPP: 87 SECONDS (ref 23–37)
BUN SERPL-MCNC: 32 MG/DL (ref 5–25)
CALCIUM SERPL-MCNC: 8.7 MG/DL (ref 8.3–10.1)
CHLORIDE SERPL-SCNC: 107 MMOL/L (ref 100–108)
CO2 SERPL-SCNC: 29 MMOL/L (ref 21–32)
CREAT SERPL-MCNC: 1.09 MG/DL (ref 0.6–1.3)
GFR SERPL CREATININE-BSD FRML MDRD: 65 ML/MIN/1.73SQ M
GLUCOSE SERPL-MCNC: 96 MG/DL (ref 65–140)
GLUCOSE SERPL-MCNC: 97 MG/DL (ref 65–140)
INR PPP: 1.75 (ref 0.84–1.19)
POTASSIUM SERPL-SCNC: 3.7 MMOL/L (ref 3.5–5.3)
PROTHROMBIN TIME: 20.6 SECONDS (ref 11.6–14.5)
SODIUM SERPL-SCNC: 143 MMOL/L (ref 136–145)

## 2020-07-02 PROCEDURE — 97116 GAIT TRAINING THERAPY: CPT

## 2020-07-02 PROCEDURE — 97530 THERAPEUTIC ACTIVITIES: CPT

## 2020-07-02 PROCEDURE — 99232 SBSQ HOSP IP/OBS MODERATE 35: CPT | Performed by: FAMILY MEDICINE

## 2020-07-02 PROCEDURE — 80048 BASIC METABOLIC PNL TOTAL CA: CPT | Performed by: FAMILY MEDICINE

## 2020-07-02 PROCEDURE — 85610 PROTHROMBIN TIME: CPT | Performed by: FAMILY MEDICINE

## 2020-07-02 PROCEDURE — 85730 THROMBOPLASTIN TIME PARTIAL: CPT | Performed by: FAMILY MEDICINE

## 2020-07-02 PROCEDURE — 82948 REAGENT STRIP/BLOOD GLUCOSE: CPT

## 2020-07-02 PROCEDURE — 97535 SELF CARE MNGMENT TRAINING: CPT

## 2020-07-02 RX ORDER — SODIUM CHLORIDE 9 MG/ML
500 INJECTION, SOLUTION INTRAVENOUS ONCE
Status: COMPLETED | OUTPATIENT
Start: 2020-07-02 | End: 2020-07-02

## 2020-07-02 RX ORDER — WARFARIN SODIUM 5 MG/1
5 TABLET ORAL
Status: DISCONTINUED | OUTPATIENT
Start: 2020-07-02 | End: 2020-07-04

## 2020-07-02 RX ORDER — LISINOPRIL 5 MG/1
5 TABLET ORAL DAILY
Status: DISCONTINUED | OUTPATIENT
Start: 2020-07-03 | End: 2020-07-09 | Stop reason: HOSPADM

## 2020-07-02 RX ADMIN — TAMSULOSIN HYDROCHLORIDE 0.4 MG: 0.4 CAPSULE ORAL at 17:03

## 2020-07-02 RX ADMIN — ATORVASTATIN CALCIUM 10 MG: 10 TABLET, FILM COATED ORAL at 08:36

## 2020-07-02 RX ADMIN — QUETIAPINE FUMARATE 50 MG: 25 TABLET ORAL at 21:28

## 2020-07-02 RX ADMIN — ROPINIROLE HYDROCHLORIDE 0.5 MG: 0.25 TABLET, FILM COATED ORAL at 21:28

## 2020-07-02 RX ADMIN — LEVOTHYROXINE SODIUM 50 MCG: 50 TABLET ORAL at 08:35

## 2020-07-02 RX ADMIN — SODIUM CHLORIDE 500 ML/HR: 0.9 INJECTION, SOLUTION INTRAVENOUS at 16:28

## 2020-07-02 RX ADMIN — WARFARIN SODIUM 5 MG: 5 TABLET ORAL at 17:02

## 2020-07-02 RX ADMIN — ASPIRIN 81 MG: 81 TABLET, COATED ORAL at 08:36

## 2020-07-02 RX ADMIN — SUCRALFATE 1 G: 1 TABLET ORAL at 17:02

## 2020-07-02 RX ADMIN — HEPARIN SODIUM 1500 UNITS: 1000 INJECTION INTRAVENOUS; SUBCUTANEOUS at 06:15

## 2020-07-02 RX ADMIN — PANTOPRAZOLE SODIUM 40 MG: 40 TABLET, DELAYED RELEASE ORAL at 08:35

## 2020-07-02 RX ADMIN — POTASSIUM CHLORIDE 20 MEQ: 20 SOLUTION ORAL at 08:36

## 2020-07-02 RX ADMIN — METOPROLOL TARTRATE 12.5 MG: 25 TABLET ORAL at 12:08

## 2020-07-02 RX ADMIN — SERTRALINE HYDROCHLORIDE 25 MG: 25 TABLET ORAL at 08:35

## 2020-07-02 RX ADMIN — DOCUSATE SODIUM 100 MG: 100 CAPSULE, LIQUID FILLED ORAL at 08:35

## 2020-07-02 RX ADMIN — DOCUSATE SODIUM 100 MG: 100 CAPSULE, LIQUID FILLED ORAL at 17:02

## 2020-07-02 RX ADMIN — GABAPENTIN 100 MG: 100 CAPSULE ORAL at 21:28

## 2020-07-02 RX ADMIN — SUCRALFATE 1 G: 1 TABLET ORAL at 08:35

## 2020-07-02 RX ADMIN — MIRTAZAPINE 15 MG: 15 TABLET, FILM COATED ORAL at 21:28

## 2020-07-02 RX ADMIN — HEPARIN SODIUM 1500 UNITS: 1000 INJECTION INTRAVENOUS; SUBCUTANEOUS at 21:31

## 2020-07-02 NOTE — PLAN OF CARE
Problem: Potential for Falls  Goal: Patient will remain free of falls  Description  INTERVENTIONS:  - Assess patient frequently for physical needs  -  Identify cognitive and physical deficits and behaviors that affect risk of falls  -  [place] fall precautions as indicated by assessment   - Educate patient/family on patient safety including physical limitations  - Instruct patient to call for assistance with activity based on assessment  - Modify environment to reduce risk of injury  - Consider OT/PT consult to assist with strengthening/mobility  Outcome: Progressing     Problem: Prexisting or High Potential for Compromised Skin Integrity  Goal: Skin integrity is maintained or improved  Description  INTERVENTIONS:  - Identify patients at risk for skin breakdown  - Assess and monitor skin integrity  - Assess and monitor nutrition and hydration status  - Monitor labs   - Assess for incontinence   - Turn and reposition patient  - Assist with mobility/ambulation  - Relieve pressure over bony prominences  - Avoid friction and shearing  - Provide appropriate hygiene as needed including keeping skin clean and dry  - Evaluate need for skin moisturizer/barrier cream  - Collaborate with interdisciplinary team   - Patient/family teaching  - Consider wound care consult   Outcome: Progressing     Problem: Nutrition/Hydration-ADULT  Goal: Nutrient/Hydration intake appropriate for improving, restoring or maintaining nutritional needs  Description  Monitor and assess patient's nutrition/hydration status for malnutrition  Collaborate with interdisciplinary team and initiate plan and interventions as ordered  Monitor patient's weight and dietary intake as ordered or per policy  Utilize nutrition screening tool and intervene as necessary  Determine patient's food preferences and provide high-protein, high-caloric foods as appropriate       INTERVENTIONS:  - Monitor oral intake, urinary output, labs, and treatment plans  - Assess nutrition and hydration status and recommend course of action  - Evaluate amount of meals eaten  - Assist patient with eating if necessary   - Allow adequate time for meals  - Recommend/ encourage appropriate diets, oral nutritional supplements, and vitamin/mineral supplements  - Order, calculate, and assess calorie counts as needed  - Recommend, monitor, and adjust tube feedings and TPN/PPN based on assessed needs  - Assess need for intravenous fluids  - Provide specific nutrition/hydration education as appropriate  - Include patient/family/caregiver in decisions related to nutrition  Outcome: Progressing

## 2020-07-02 NOTE — ASSESSMENT & PLAN NOTE
I suspect secondary to his increased anxiety as he is scared to eat but there is no with seeing of dysphagia he did okay with breakfast and he did okay with dinner without any issues and medications yesterday today he was complaining of after he is taking the med that he feeling this is taking sensation show discussed with nursing that will crush the meds     Also he does not have any teeth so he will continue modified diet puree  I did ask Psychiatry to evaluate the patient as well  Psychiatry evaluated yesterday I will not increase his nighttime Remeron secondary to some hypotension today  But I will add Xanax instead of p r n  Anxiety is p r n  30 minutes prior to food as he does feel anxious to eat  But he has been gaining weight while he is here continue pureed diet he is also does not have any dentures  Follow dysphagia diet  Follow aspiration precaution  GI, speech and swallow recommendation appreciated-follow video swallow evaluation  As per GI note, patient does not want EGD I discussed with patient again if he wants to have the EGD but he kind of went around the [place]  Will crushes pills  Will discuss with son will monitor for another day but there is no further workup warranted  S/P esophagogram on 11/19:Limited examination revealing no gross hypopharyngeal or esophageal  abnormalities  He had another 1 today did not show any physical abnormalities  See above management  Actually had a discussion with the patient regarding the PEG tube as his PCP recommended as well patient declined

## 2020-07-02 NOTE — SOCIAL WORK
Francis placed call to Dominick Head, Director of nursing at [facility], as per staff, she will not be in until 1500 today, they will give her message to call FRANCIS back

## 2020-07-02 NOTE — ASSESSMENT & PLAN NOTE
Secondary to multiple factors  Continue nutritional supplement  He did okay with breakfast he was supposed to get lunch when I saw him discussed with nursing there is no issues with dysphagia there is no physical findings a for now as he also refused EGD but the barium swallow esophagram was normal he is to continue puree diet  Suspect all this is secondary to his anxieties as he is scared to eat    He will need some rehab  Follow PT OT, speech evaluation- final disposition to nursing home

## 2020-07-02 NOTE — ASSESSMENT & PLAN NOTE
Secondary to LV thrombus he is currently on heparin to Coumadin bridge INR should be between 2-3 discussed with Cardiology as well  INR still subtherapeutic will increase Coumadin to 5 mg daily with heparin bridge  INR 2-3    Blood pressure stable increase metoprolol back up to 25 mg daily

## 2020-07-02 NOTE — PLAN OF CARE
Problem: Potential for Falls  Goal: Patient will remain free of falls  Description  INTERVENTIONS:  - Assess patient frequently for physical needs  -  Identify cognitive and physical deficits and behaviors that affect risk of falls  -  [place] fall precautions as indicated by assessment   - Educate patient/family on patient safety including physical limitations  - Instruct patient to call for assistance with activity based on assessment  - Modify environment to reduce risk of injury  - Consider OT/PT consult to assist with strengthening/mobility  Outcome: Progressing     Problem: Prexisting or High Potential for Compromised Skin Integrity  Goal: Skin integrity is maintained or improved  Description  INTERVENTIONS:  - Identify patients at risk for skin breakdown  - Assess and monitor skin integrity  - Assess and monitor nutrition and hydration status  - Monitor labs   - Assess for incontinence   - Turn and reposition patient  - Assist with mobility/ambulation  - Relieve pressure over bony prominences  - Avoid friction and shearing  - Provide appropriate hygiene as needed including keeping skin clean and dry  - Evaluate need for skin moisturizer/barrier cream  - Collaborate with interdisciplinary team   - Patient/family teaching  - Consider wound care consult   Outcome: Progressing     Problem: Nutrition/Hydration-ADULT  Goal: Nutrient/Hydration intake appropriate for improving, restoring or maintaining nutritional needs  Description  Monitor and assess patient's nutrition/hydration status for malnutrition  Collaborate with interdisciplinary team and initiate plan and interventions as ordered  Monitor patient's weight and dietary intake as ordered or per policy  Utilize nutrition screening tool and intervene as necessary  Determine patient's food preferences and provide high-protein, high-caloric foods as appropriate       INTERVENTIONS:  - Monitor oral intake, urinary output, labs, and treatment plans  - Assess nutrition and hydration status and recommend course of action  - Evaluate amount of meals eaten  - Assist patient with eating if necessary   - Allow adequate time for meals  - Recommend/ encourage appropriate diets, oral nutritional supplements, and vitamin/mineral supplements  - Order, calculate, and assess calorie counts as needed  - Recommend, monitor, and adjust tube feedings and TPN/PPN based on assessed needs  - Assess need for intravenous fluids  - Provide specific nutrition/hydration education as appropriate  - Include patient/family/caregiver in decisions related to nutrition  Outcome: Progressing

## 2020-07-02 NOTE — ASSESSMENT & PLAN NOTE
· I cannot find any records revealing that this is a chronic thrombus as discussed with Cardiology possibly had in the past   Had a 2D echo he does have an LV thrombus yesterday recommendation for at least for the thrombus 3 months anticoagulation but he does have atrial fibrillation      · Increase Coumadin to 5 mg daily INR tomorrow continue heparin and Coumadin bridge till INR is 2

## 2020-07-02 NOTE — PLAN OF CARE
Problem: OCCUPATIONAL THERAPY ADULT  Goal: Performs self-care activities at highest level of function for planned discharge setting  See evaluation for individualized goals  Description  Treatment Interventions: ADL retraining, Functional transfer training, UE strengthening/ROM, Endurance training, Cognitive reorientation, Patient/family training, Equipment evaluation/education, Neuromuscular reeducation, Compensatory technique education, Continued evaluation, Energy conservation, Activityengagement          See flowsheet documentation for full assessment, interventions and recommendations  Outcome: Progressing  Note:   Limitation: Decreased ADL status, Decreased UE strength, Decreased Safe judgement during ADL, Decreased cognition, Decreased endurance, Decreased self-care trans, Decreased high-level ADLs  Prognosis: Good, Fair  Assessment: Pt seen for treatment session #2 this date  Pt alert and agreeable to participate at this time  Pt with increased performance this date during bed mobility, functional transfers, and toileting tasks with use of RW for UB support  Pt continues to demonstrate cognitive deficits which affect performance during functional tasks  Pt continues to make progress towards goals, limited by decrease activity tolerance, decrease standing balance, decrease performance during ADL tasks, decrease cognition, decrease safety awareness , increase impulsiveness, decrease UB MS, decrease generalized strength, decrease activity engagement and decrease performance during functional transfers  Pt would benefit from continued acute OT services to address deficits as well as HHOT upon return to [facility]  Upon end of session Pt seated OOB in recliner with tray table in front of pt with breakfast tray, call bell in reach, chair alarm intact and all needs met at this time     Recommendation: (return to [facility] with [address] [place] [address])  OT Discharge Recommendation: Home with skilled therapy

## 2020-07-02 NOTE — ASSESSMENT & PLAN NOTE
Return to [facility] with skilled physical therapy  High risk for pressure ulcer  Follow-up PT OT  Change position every 2 hours  Follow dietitian recommendation await recommendation by physical therapy but he will need some rehab

## 2020-07-02 NOTE — ASSESSMENT & PLAN NOTE
· 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   As reviewed per records  · He is compensated  He is actually failure to thrive no need for any diuretics  He is EF is 35-40% with grade 3 diastolic dysfunction on the echocardiogram    · Blood pressure is actually elevated  Restart lisinopril but 5 mg daily and also increase the Toprol back up to 25 mg daily

## 2020-07-02 NOTE — PROGRESS NOTES
RIGHT ac area infiltration as fluids infusing- soft mobile- iv infiltration- will dc and switch arms- warm compresses

## 2020-07-02 NOTE — PHYSICAL THERAPY NOTE
PHYSICAL THERAPY TREATMENT NOTE  NAME:  Trinidad Isaac  DATE: 07/02/20    Length Of Stay: 5  Performed at least 2 patient identifiers during session: Name and Birthday    TREATMENT:      07/02/20 0835   Pain Assessment   Pain Assessment Tool 0-10   Pain Score No Pain   Restrictions/Precautions   Other Precautions Cognitive; Chair Alarm; Bed Alarm; Fall Risk   General   Chart Reviewed Yes   Response to Previous Treatment Patient with no complaints from previous session  Subjective   Subjective "I don't feel well "   Bed Mobility   Supine to Sit 6  Modified independent   Additional items Increased time required   Additional Comments HOB flat with increased time   Transfers   Sit to Stand   (steadying assistance  slight posterior lean progress to SBA)   Additional items Assist x 1; Increased time required   Stand to Sit   (stand by assistancce)   Additional items Increased time required;Verbal cues   Stand pivot   (steadying assistance to stand by assistance)   Additional items Assist x 1; Increased time required;Verbal cues   Additional Comments use of RW  requires steadying assistance initially to achieve standing due to slight posterior lean  progressed sit to stand transfers to stand by assistance  stand ot sit with stand by assistance with min cues fo rhand placement  spt with RW with steadying to stand by assistance  min cues to turn completely prior to sitting  Ambulation/Elevation   Gait pattern Narrow KATLYN; Short stride  (slow anna)   Gait Assistance   (steadying to Stand by assistance)   Additional items Verbal cues   Assistive Device Rolling walker   Distance 13'x1 with RW with steadying to stand by assistance with min cues for increased step length  ambulated 110' with RW with stand by assistance with slow anna with decreased step length and min cues of increased step length      Balance   Static Sitting Good   Dynamic Sitting Good   Static Standing Fair   Dynamic Standing [address] Activity Tolerance   Activity Tolerance Patient tolerated treatment well   Medical Staff Made Aware Cesia REID   Nurse Made Aware RNNilam   Exercises   Balance training  standing without UE support reaching anteriorly outside KATLYN with stand by assistance  Assessment   Prognosis Good   Problem List Decreased strength;Decreased endurance;Decreased mobility; Impaired balance; Impaired judgement;Decreased safety awareness;Decreased cognition   Goals   PT Treatment Day 2   Plan   Treatment/Interventions Functional transfer training;LE strengthening/ROM; Therapeutic exercise; Endurance training;Cognitive reorientation;Patient/family training;Equipment eval/education; Bed mobility;Gait training; Compensatory technique education;Spoke to nursing;OT;Spoke to case management   Progress Progressing toward goals   PT Frequency Other (Comment)  (3-5x/week)   Recommendation   PT Discharge Recommendation Home with skilled therapy; Other (Comment)  (return to [facility] with PT)   Equipment Recommended   (pt has walker and wheelchair)   PT - OK to Discharge Yes     Pt tolerated session well despite not feeling well progressing transfers and ambulation this date performing with decreased assistance and increasing ambulation tolerance  He requires minimal cues for safety and improved gait pattern  He is limited by decreased balance, strength, endurance and safety  He will continue to benefit from PT services to maximize LOF       iMc Downs, PT,DPT

## 2020-07-02 NOTE — ASSESSMENT & PLAN NOTE
Malnutrition Findings:   Malnutrition type: Chronic illness  Degree of Malnutrition: Other severe protein calorie malnutrition(related to oropharyngeal dysphagia as evidenced by < 75% energy intake > 1 mo, severe muscle wasting noted at pectoralis, deltoid, interosseous muscles, moderate fat loss at orbitals and thoracic region, 16 1% wt loss x 8 mo (10/1/19 143#, 6/27/20 120#))    BMI Findings:  BMI Classifications: Underweight < 18 5     Body mass index is 18 06 kg/m²

## 2020-07-02 NOTE — OCCUPATIONAL THERAPY NOTE
OccupationalTherapy Progress Note     Patient Name: Saida Ramsey  AJWZP'Y Date: 7/2/2020  Problem List  Principal Problem:    Oropharyngeal dysphagia  Active Problems:    Failure to thrive in adult    Generalized weakness    Permanent atrial fibrillation (HCC)    Anxiety    Urinary retention    Type 2 diabetes mellitus with diabetic neuropathy, without long-term current use of insulin ([facility])    Bipolar 1 disorder, depressed ([facility])    Chronic combined systolic and diastolic congestive heart failure ([facility] )    Physical deconditioning    Mixed obsessional thoughts and acts    Severe protein-calorie malnutrition ([facility] )    Left ventricular apical thrombus            07/02/20 0900   Restrictions/Precautions   Other Precautions Chair Alarm; Bed Alarm;Cognitive; Fall Risk   Pain Assessment   Pain Assessment Tool 0-10   Pain Score No Pain   ADL   Grooming Assistance   (SBA)   Grooming Deficit Verbal cueing;Supervision/safety; Increased time to complete;Wash/dry hands   Grooming Comments Pt standing at sinkside and completing hand hygiene @ SBA with no LOB noted  LB Dressing Comments Pt declining to complete LB dressing at this time  Toileting Assistance    ([address] )   Toileting Deficit Impulsive;Steadying;Verbal cueing;Supervison/safety;Grab bar use;Clothing management up;Clothing management down;Perineal hygiene   Toileting Comments Pt completing toileting tasks with use of RW for UB support  Steadying Assist for CM and pericare  Bed Mobility   Supine to Sit 6  Modified independent   Additional items Increased time required   Additional Comments HOB flat with no bedrails   Transfers   Sit to Stand   [facility] Assist)   Additional items Assist x 1; Increased time required;Verbal cues   Stand to Sit   (SBA)   Additional items Increased time required;Verbal cues   Stand pivot   (Steadying progressing to SBA)   Additional items Increased time required;Verbal cues   Additional Comments use of RW for UB support  vc'ing for safety and technique, Pt impulsive at times  Cognition   Overall Cognitive Status Impaired   Arousal/Participation Alert; Responsive; Cooperative   Attention Attends with cues to redirect   Following Commands Follows one step commands without difficulty   Comments Pt continues to be hyperfocused on urinating and BMs and "where does it go if i have to go?" Pt thoroughly educated that he is wearing a brief if he has an incontinent episode although if he feels like he needs to use the bathroom to ring the call bell for the nurse and he can walk into the bathroom  Activity Tolerance   Activity Tolerance Patient tolerated treatment well   Medical Staff Made Aware Spoke with RN, Sophia Aparicio  Spoke with PT, Suman Krishnan   Assessment   Assessment Pt seen for treatment session #2 this date  Pt alert and agreeable to participate at this time  Pt with increased performance this date during bed mobility, functional transfers, and toileting tasks with use of RW for UB support  Pt continues to demonstrate cognitive deficits which affect performance during functional tasks  Pt continues to make progress towards goals, limited by decrease activity tolerance, decrease standing balance, decrease performance during ADL tasks, decrease cognition, decrease safety awareness , increase impulsiveness, decrease UB MS, decrease generalized strength, decrease activity engagement and decrease performance during functional transfers  Pt would benefit from continued acute OT services to address deficits as well as HHOT upon return to [facility]  Upon end of session Pt seated OOB in recliner with tray table in front of pt with breakfast tray, call bell in reach, chair alarm intact and all needs met at this time  Plan   Treatment Interventions ADL retraining;Functional transfer training;UE strengthening/ROM; Endurance training;Cognitive reorientation;Patient/family training;Equipment evaluation/education; Neuromuscular reeducation; Compensatory technique education;Continued evaluation; Energy conservation; Activityengagement   Goal Expiration Date 07/09/20   OT Treatment Day 2   OT Frequency 3-5x/wk   Recommendation   Recommendation   (return to [facility] with [address])   OT Discharge Recommendation Home with skilled therapy     Pt goals to be met by 7/9/2020     1  Pt will demonstrate ability to complete LB dressing @ S in order to increase safety and independence during meaningful tasks  2  Pt will demonstrate ability to complete toileting tasks including CM and pericare @ S in order to increase safety and independence during meaningful tasks  3  Pt will demonstrate ability to complete EOB, chair, toilet/commode transfers @ S in order to increase performance and participation during functional tasks  4  Pt will demonstrate ability to stand for 5-6 minutes while maintaining G balance with use of RW for UB support PRN  5  Pt will demonstrate ability to tolerate 30-35 minute OT session with no vc'ing for deep breathing or use of energy conservation techniques in order to increase activity tolerance during functional tasks  6  Pt will demonstrate Good carryover of use of energy conservation/compensatory strategies during ADLs and functional tasks in order to increase safety and reduce risk for falls  7  Pt will demonstrate Good attention and participation in continued evaluation of functional ambulation house hold distances in order to assist with safe d/c planning  8  Pt will attend to continued cognitive assessments 100% of the time in order to provide most appropriate d/c recommendations  9  Pt will follow 100% simple 1-step commands and be A&O x4 consistently with environmental cues to increase participation in functional activities  10  Pt will identify 3 areas of interest/hobbies and 1 intervention on how to incorporate into daily life in order to increase interaction with environment and peers as well as increase participation in meaningful tasks  11  Pt will demonstrate 100% carryover of BUE HEP in order to increase BUE MS and increase performance during functional tasks upon d/c home      Baron Soriano OTR/L

## 2020-07-02 NOTE — PROGRESS NOTES
Progress Note - Sandra Foster 1940, 68 y o  male MRN: [id number]    Unit/Bed#: -01 Encounter: [id number]    Primary Care Provider: Deedee Berry MD   Date and time admitted to hospital: 6/27/2020 12:53 PM        Left ventricular apical thrombus  Assessment & Plan  · I cannot find any records revealing that this is a chronic thrombus as discussed with Cardiology possibly had in the past   Had a 2D echo he does have an LV thrombus yesterday recommendation for at least for the thrombus 3 months anticoagulation but he does have atrial fibrillation  · Increase Coumadin to 5 mg daily INR tomorrow continue heparin and Coumadin bridge till INR is 2    Severe protein-calorie malnutrition (HCC)  Assessment & Plan  Malnutrition Findings:   Malnutrition type: Chronic illness  Degree of Malnutrition: Other severe protein calorie malnutrition(related to oropharyngeal dysphagia as evidenced by < 75% energy intake > 1 mo, severe muscle wasting noted at pectoralis, deltoid, interosseous muscles, moderate fat loss at orbitals and thoracic region, 16 1% wt loss x 8 mo (10/1/19 143#, 6/27/20 120#))    BMI Findings:  BMI Classifications: Underweight < 18 5     Body mass index is 18 06 kg/m²  Mixed obsessional thoughts and acts  Assessment & Plan  Severe nature  Continue current treatment and management    Physical deconditioning  Assessment & Plan  Return to [facility] with skilled physical therapy  High risk for pressure ulcer  Follow-up PT OT  Change position every 2 hours  Follow dietitian recommendation await recommendation by physical therapy but he will need some rehab    Chronic combined systolic and diastolic congestive heart failure ([address] )  Assessment & Plan  · 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   As reviewed per records  · He is compensated  He is actually failure to thrive no need for any diuretics    He is EF is 35-40% with grade 3 diastolic dysfunction on the echocardiogram    · Blood pressure is actually elevated  Restart lisinopril but 5 mg daily and also increase the Toprol back up to 25 mg daily  Bipolar 1 disorder, depressed ([facility] [address] )  Assessment & Plan  Continue home medication      Type 2 diabetes mellitus with diabetic neuropathy, without long-term current use of insulin [facility])  Assessment & Plan  No results found for: HGBA1C    Recent Labs     06/30/20  2105 07/01/20  0718 07/01/20  1100 07/01/20  2043   POCGLU 100 92 118 102       Blood Sugar Average: Last 72 hrs:  (P) 13 [id number] sliding scale  Continue gabapentin  Controlled    Urinary retention  Assessment & Plan  History of urinary retention  Follow urinary retention protocol  No issues    Anxiety  Assessment & Plan  Continue home medication  Uncontrolled that is causing him not T eat much as he is scared to eat  Consulted Psychiatry to see if any medication needs to be adjusted  Reviewed recommendation all actually place Xanax 30 minutes prior to each meal to see if that helps    Permanent atrial fibrillation [facility])  Assessment & Plan    Secondary to LV thrombus he is currently on heparin to Coumadin bridge INR should be between 2-3 discussed with Cardiology as well  INR still subtherapeutic will increase Coumadin to 5 mg daily with heparin bridge  INR 2-3  Blood pressure stable increase metoprolol back up to 25 mg daily    Generalized weakness  Assessment & Plan  Secondary to multiple factors  Continue nutritional supplement  He did okay with breakfast he was supposed to get lunch when I saw him discussed with nursing there is no issues with dysphagia there is no physical findings a for now as he also refused EGD but the barium swallow esophagram was normal he is to continue puree diet  Suspect all this is secondary to his anxieties as he is scared to eat    He will need some rehab  Follow PT OT, speech evaluation- final disposition to nursing home    Failure to thrive in adult  Assessment & Plan  TSH is normal  Continue nutritional supplement  Continue diet  Speech and swallow, nutritional consult appreciated  Follow-up PT OT recommendation  Patient's poor p o  Intake with feeling of dysphagia suspect secondary to increased anxiety he did okay with breakfast and dinner yesterday that he was complaining that he feels that the pill is stuck sensation  Reviewed input done by Psychiatry  Will make some adjustments a but he did gain weight since he has been here  I did have a detailed discussion with his primary care physician yesterday Dr Katherine Xiao- in agreement that this is psychological   Patient is unable to return his assisted living as they are not able to accommodate his diet patient does need a 24 hour supervision and speech continuation working  He has been eating 50% of the meals and his meds are being crushed without any issue although he at times does still complain certain things  Today BUN slightly elevated will give him a bolus of fluids  Discussed with son in detail and they are in agreement that patient will go to [facility] NH on discharge  Will monitor labs in the morning  Continue strict I's and O's and daily weights    * Oropharyngeal dysphagia  Assessment & Plan  I suspect secondary to his increased anxiety as he is scared to eat but there is no with seeing of dysphagia he did okay with breakfast and he did okay with dinner without any issues and medications yesterday today he was complaining of after he is taking the med that he feeling this is taking sensation show discussed with nursing that will crush the meds     Also he does not have any teeth so he will continue modified diet puree  I did ask Psychiatry to evaluate the patient as well  Psychiatry evaluated yesterday I will not increase his nighttime Remeron secondary to some hypotension today  But I will add Xanax instead of p r n  Anxiety is p r n  30 minutes prior to food as he does feel anxious to eat    But he has been gaining weight while he is here continue pureed diet he is also does not have any dentures  Follow dysphagia diet  Follow aspiration precaution  GI, speech and swallow recommendation appreciated-follow video swallow evaluation  As per GI note, patient does not want EGD I discussed with patient again if he wants to have the EGD but he kind of went around the [place]  Will crushes pills  Will discuss with son will monitor for another day but there is no further workup warranted  S/P esophagogram on :Limited examination revealing no gross hypopharyngeal or esophageal  abnormalities  He had another 1 today did not show any physical abnormalities  See above management  Actually had a discussion with the patient regarding the PEG tube as his PCP recommended as well patient declined  VTE Pharmacologic Prophylaxis:   Pharmacologic: Heparin Drip  Mechanical VTE Prophylaxis in Place: Yes    Patient Centered Rounds: I have performed bedside rounds with nursing staff today  Discussions with Specialists or Other Care Team Provider:  I discussed with patient's primary care physician    Education and Discussions with Family / Patient:  Patient and son    Time Spent for Care: 30 minutes  More than 50% of total time spent on counseling and coordination of care as described above  Current Length of Stay: 5 day(s)    Current Patient Status: Inpatient   Certification Statement: The patient will continue to require additional inpatient hospital stay due to Failure to thrive, subtherapeutic INR, placement    Discharge Plan:  Next week    Code Status: Level 3 - DNAR and DNI      Subjective:   Patient seen and examined still states not 100% but discussed with nursing eating 50% says sometimes has issues with full pills being stuck    Denies any chest pain or shortness of breath    Objective:     Vitals:   Temp (24hrs), Av 8 °F (36 6 °C), Min:97 7 °F (36 5 °C), Max:97 8 °F (36 6 °C)    Temp:  [97 7 °F (36 5 °C)-97 8 °F (36 6 °C)] 97 7 °F (36 5 °C)  HR: [60-79] 79  Resp:  [18-20] 18  BP: (127-161)/(66-81) 154/81  SpO2:  [98 %-100 %] 98 %  Body mass index is 18 06 kg/m²  Input and Output Summary (last 24 hours): Intake/Output Summary (Last 24 hours) at 7/2/2020 1529  Last data filed at 7/2/2020 1245  Gross per 24 hour   Intake 300 ml   Output    Net 300 ml       Physical Exam:     Physical Exam   Constitutional: He is oriented to person, place, and time  Cachectic   HENT:   Head: Normocephalic and atraumatic  Eyes: Pupils are equal, round, and reactive to light  EOM are normal    Neck: Normal range of motion  Cardiovascular: Normal rate, regular rhythm and normal heart sounds  Pulmonary/Chest: Effort normal and breath sounds normal  No stridor  No respiratory distress  He has no wheezes  Abdominal: Soft  Bowel sounds are normal    Musculoskeletal: Normal range of motion  He exhibits no edema  Neurological: He is alert and oriented to person, place, and time  He has normal reflexes  cranial nerve 2-12 are normal   Normal neurological exam   Skin: Skin is warm  Psychiatric: He has a normal mood and affect           Additional Data:     Labs:    Results from last 7 days   Lab Units 06/29/20  1752 06/27/20  1324   WBC Thousand/uL 5 93 3 39*   HEMOGLOBIN g/dL 12 6 12 8   HEMATOCRIT % 37 3 37 3   PLATELETS Thousands/uL 115* 111*   NEUTROS PCT %  --  55   LYMPHS PCT %  --  26   MONOS PCT %  --  18*   EOS PCT %  --  1     Results from last 7 days   Lab Units 07/02/20  0517 06/29/20  1002   SODIUM mmol/L 143 144   POTASSIUM mmol/L 3 7 3 9   CHLORIDE mmol/L 107 109*   CO2 mmol/L 29 29   BUN mg/dL 32* 17   CREATININE mg/dL 1 09 1 05   ANION GAP mmol/L 7 6   CALCIUM mg/dL 8 7 9 1   ALBUMIN g/dL  --  3 5   TOTAL BILIRUBIN mg/dL  --  0 46   ALK PHOS U/L  --  82   ALT U/L  --  13   AST U/L  --  18   GLUCOSE RANDOM mg/dL 97 108     Results from last 7 days   Lab Units 07/02/20  0517   INR  1 75*     Results from last 7 days   Lab Units 07/01/20  2043 07/01/20  1100 07/01/20  0718 06/30/20  2105 06/30/20  1631 06/30/20  1139 06/30/20  0734 06/29/20  2153 06/29/20  1619 06/29/20  1047 06/29/20  0729 06/28/20  2124   POC GLUCOSE mg/dl 102 118 92 100 103 119 83 84 85 100 97 94                   * I Have Reviewed All Lab Data Listed Above  * Additional Pertinent Lab Tests Reviewed:  All Labs Within Last 24 Hours Reviewed    Imaging:    Imaging Reports Reviewed Today Include:  None today  Imaging Personally Reviewed by Myself Includes:      Recent Cultures (last 7 days):           Last 24 Hours Medication List:     Current Facility-Administered Medications:  acetaminophen 650 mg Oral Q6H PRN Nidia Falcon MD    ALPRAZolam 0 25 mg Oral TID PRN Fredrick Phillips MD    aluminum-magnesium hydroxide-simethicone 30 mL Oral Q6H PRN Nidia Falcon MD    aspirin 81 mg Oral Daily Nidia Falcon MD    atorvastatin 10 mg Oral Daily Nidia Falcon MD    docusate sodium 100 mg Oral BID Nidia Falcon MD    ergocalciferol 50,000 Units Oral Weekly Nidia Falcon MD    gabapentin 100 mg Oral HS Nidia Falcon MD    heparin (porcine) 3-20 Units/kg/hr (Order-Specific) Intravenous Titrated Nidia Falcon MD Last Rate: 11 Units/kg/hr (07/02/20 0617)   heparin (porcine) 1,500 Units Intravenous Q1H PRN Nidia Falcon MD    heparin (porcine) 3,000 Units Intravenous Q1H PRN Nidia Falcon MD    levothyroxine 50 mcg Oral Daily Nidia Falcon MD    [START ON 7/3/2020] lisinopril 5 mg Oral Daily Fredrick Phillips MD    [facility] ON 7/3/2020] metoprolol tartrate 25 mg Oral QAM Fredrick Phillips MD    mirtazapine 15 mg Oral HS Nidia Falcon MD    nitroglycerin 0 4 mg Sublingual Q5 Min PRN Nidia Falcon MD    ondansetron 4 mg Intravenous Q6H PRN Nidia Falcon MD    pantoprazole 40 mg Oral Daily Nidia Falcon MD    potassium chloride 20 mEq Oral Daily Nidia Falcon MD    QUEtiapine 50 mg Oral HS Nidia Falcon MD    rOPINIRole 0 5 mg Oral HS Shayla Laboy MD    sertraline 25 mg Oral QAM Genevieve Mckinnon MD    sodium chloride (PF) 3 mL Intravenous Q1H PRN Foreign Morris MD    sodium chloride 500 mL/hr Intravenous Once Eugenia Currie MD    sucralfate 1 g Oral BID Shayla Laboy MD    tamsulosin 0 4 mg Oral Daily With Stiven Bunch MD    warfarin 5 mg Oral Daily (warfarin) Eugenia Currie MD         Today, Patient Was Seen By: Eugenia Currie MD    ** Please Note: Dictation voice to text software may have been used in the creation of this document   **

## 2020-07-02 NOTE — NURSING NOTE
Dr Rose Aleman aware that patient has swollen right upper forearm, but IV fluids infusing in right wrist area  No swelling down by IV site  IV site changed anyway  Warm soak applied

## 2020-07-02 NOTE — ASSESSMENT & PLAN NOTE
No results found for: HGBA1C    Recent Labs     06/30/20  2105 07/01/20  0718 07/01/20  1100 07/01/20  2043   POCGLU 100 92 118 102       Blood Sugar Average: Last 72 hrs:  (P) 90 [id number] sliding scale  Continue gabapentin  Controlled

## 2020-07-02 NOTE — PLAN OF CARE
Problem: PHYSICAL THERAPY ADULT  Goal: Performs mobility at highest level of function for planned discharge setting  See evaluation for individualized goals  Description  Treatment/Interventions: Functional transfer training, LE strengthening/ROM, Therapeutic exercise, Endurance training, Patient/family training, Equipment eval/education, Bed mobility, Gait training, Compensatory technique education, Spoke to nursing, Spoke to case management, OT  Equipment Recommended: (walker/wheelchair-pt has)       See flowsheet documentation for full assessment, interventions and recommendations  Outcome: Progressing  Note:   Prognosis: Good  Problem List: Decreased strength, Decreased endurance, Decreased mobility, Impaired balance, Impaired judgement, Decreased safety awareness, Decreased cognition  Assessment: Pt tolerated session well despite not feeling well progressing transfers and ambulation this date performing with decreased assistance and increasing ambulation tolerance  He requires minimal cues for safety and improved gait pattern  He is limited by decreased balance, strength, endurance and safety  He will continue to benefit from PT services to maximize LOF  PT Discharge Recommendation: Home with skilled therapy, Other (Comment)(return to [facility] with PT)     PT - OK to Discharge: Yes    See flowsheet documentation for full assessment

## 2020-07-02 NOTE — ASSESSMENT & PLAN NOTE
TSH is normal  Continue nutritional supplement  Continue diet  Speech and swallow, nutritional consult appreciated  Follow-up PT OT recommendation  Patient's poor p o  Intake with feeling of dysphagia suspect secondary to increased anxiety he did okay with breakfast and dinner yesterday that he was complaining that he feels that the pill is stuck sensation  Reviewed input done by Psychiatry  Will make some adjustments a but he did gain weight since he has been here  I did have a detailed discussion with his primary care physician yesterday Dr Lexy Bernal- in agreement that this is psychological   Patient is unable to return his assisted living as they are not able to accommodate his diet patient does need a 24 hour supervision and speech continuation working  He has been eating 50% of the meals and his meds are being crushed without any issue although he at times does still complain certain things  Today BUN slightly elevated will give him a bolus of fluids  Discussed with son in detail and they are in agreement that patient will go to [facility] NH on discharge    Will monitor labs in the morning  Continue strict I's and O's and daily weights

## 2020-07-02 NOTE — SOCIAL WORK
Discussed pt during care co ordination rounds, pt is not stable for d/c today  CM and attending MD continue to reach out to son, with no answer or return call  Attending did speak with pts PCP  Care team discussed possibility of peg tube placement, as pt continues with swallowing issues RT anxiety and refusal to wear dentures  Team also discussed that SNF placement would be beneficial to pt as he is requiring higher level of care that perhaps [facility] can offer  PT continues to be hyper focused on his bowel movements and urination, also contributing to his lack of PO intake  Attending MD to discuss plan with pt and pts son  CM to follow    Update 1521 - pt refusing peg tube  As per pt and pts son's request, a referral has been placed to [facility] for [facility] nursing needs  1421 as per Meagan Taylor at [facility], their team will review and let us know their decision  Team will not reconvene until Monday,   7/6/20    A post acute care recommendation was made by your care team for STR  Discussed Freedom of Choice with both patient and caregiver  List of facilities given to both patient and caregiver via in person  both patient and caregiver aware the list is custom filtered for them by zip code location and that [facility] post acute providers are designated

## 2020-07-03 PROBLEM — R22.31 LOCALIZED SWELLING OF RIGHT UPPER EXTREMITY: Status: ACTIVE | Noted: 2020-07-03

## 2020-07-03 LAB
ANION GAP SERPL CALCULATED.3IONS-SCNC: 3 MMOL/L (ref 4–13)
APTT PPP: 84 SECONDS (ref 23–37)
APTT PPP: 88 SECONDS (ref 23–37)
BUN SERPL-MCNC: 31 MG/DL (ref 5–25)
CALCIUM SERPL-MCNC: 9 MG/DL (ref 8.3–10.1)
CHLORIDE SERPL-SCNC: 109 MMOL/L (ref 100–108)
CO2 SERPL-SCNC: 32 MMOL/L (ref 21–32)
CREAT SERPL-MCNC: 1.03 MG/DL (ref 0.6–1.3)
GFR SERPL CREATININE-BSD FRML MDRD: 69 ML/MIN/1.73SQ M
GLUCOSE SERPL-MCNC: 89 MG/DL (ref 65–140)
INR PPP: 1.73 (ref 0.84–1.19)
POTASSIUM SERPL-SCNC: 3.8 MMOL/L (ref 3.5–5.3)
PROTHROMBIN TIME: 20.4 SECONDS (ref 11.6–14.5)
SODIUM SERPL-SCNC: 144 MMOL/L (ref 136–145)

## 2020-07-03 PROCEDURE — 85610 PROTHROMBIN TIME: CPT | Performed by: FAMILY MEDICINE

## 2020-07-03 PROCEDURE — 85730 THROMBOPLASTIN TIME PARTIAL: CPT | Performed by: FAMILY MEDICINE

## 2020-07-03 PROCEDURE — 80048 BASIC METABOLIC PNL TOTAL CA: CPT | Performed by: FAMILY MEDICINE

## 2020-07-03 PROCEDURE — 99232 SBSQ HOSP IP/OBS MODERATE 35: CPT | Performed by: FAMILY MEDICINE

## 2020-07-03 PROCEDURE — 92526 ORAL FUNCTION THERAPY: CPT

## 2020-07-03 RX ADMIN — PANTOPRAZOLE SODIUM 40 MG: 40 TABLET, DELAYED RELEASE ORAL at 08:22

## 2020-07-03 RX ADMIN — SUCRALFATE 1 G: 1 TABLET ORAL at 08:22

## 2020-07-03 RX ADMIN — ATORVASTATIN CALCIUM 10 MG: 10 TABLET, FILM COATED ORAL at 08:22

## 2020-07-03 RX ADMIN — SUCRALFATE 1 G: 1 TABLET ORAL at 17:27

## 2020-07-03 RX ADMIN — LEVOTHYROXINE SODIUM 50 MCG: 50 TABLET ORAL at 08:22

## 2020-07-03 RX ADMIN — POTASSIUM CHLORIDE 20 MEQ: 20 SOLUTION ORAL at 08:22

## 2020-07-03 RX ADMIN — SERTRALINE HYDROCHLORIDE 25 MG: 25 TABLET ORAL at 08:22

## 2020-07-03 RX ADMIN — METOPROLOL TARTRATE 25 MG: 25 TABLET, FILM COATED ORAL at 08:22

## 2020-07-03 RX ADMIN — LISINOPRIL 5 MG: 5 TABLET ORAL at 08:22

## 2020-07-03 RX ADMIN — GABAPENTIN 100 MG: 100 CAPSULE ORAL at 21:41

## 2020-07-03 RX ADMIN — WARFARIN SODIUM 5 MG: 5 TABLET ORAL at 17:27

## 2020-07-03 RX ADMIN — DOCUSATE SODIUM 100 MG: 100 CAPSULE, LIQUID FILLED ORAL at 17:27

## 2020-07-03 RX ADMIN — QUETIAPINE FUMARATE 50 MG: 25 TABLET ORAL at 21:41

## 2020-07-03 RX ADMIN — DOCUSATE SODIUM 100 MG: 100 CAPSULE, LIQUID FILLED ORAL at 08:22

## 2020-07-03 RX ADMIN — TAMSULOSIN HYDROCHLORIDE 0.4 MG: 0.4 CAPSULE ORAL at 16:40

## 2020-07-03 RX ADMIN — MIRTAZAPINE 15 MG: 15 TABLET, FILM COATED ORAL at 21:41

## 2020-07-03 RX ADMIN — ROPINIROLE HYDROCHLORIDE 0.5 MG: 0.25 TABLET, FILM COATED ORAL at 21:41

## 2020-07-03 RX ADMIN — ASPIRIN 81 MG: 81 TABLET, COATED ORAL at 08:22

## 2020-07-03 RX ADMIN — HEPARIN SODIUM AND DEXTROSE 13 UNITS/KG/HR: 10000; 5 INJECTION INTRAVENOUS at 17:11

## 2020-07-03 NOTE — ASSESSMENT & PLAN NOTE
I suspect secondary to his increased anxiety as he is scared to eat but there is no with seeing of dysphagia he did okay with breakfast and he did okay with dinner without any issues and medications yesterday today he was complaining of after he is taking the med that he feeling this is taking sensation show discussed with nursing that will crush the meds     Also he does not have any teeth so he will continue modified diet puree  I did ask Psychiatry to evaluate the patient as well  Psychiatry evaluated yesterday I will not increase his nighttime Remeron secondary to some hypotension today  But I will add Xanax instead of p r n  Anxiety is p r n  30 minutes prior to food as he does feel anxious to eat  But he has been gaining weight while he is here continue pureed diet he is also does not have any dentures  Follow dysphagia diet  Follow aspiration precaution  GI, speech and swallow recommendation appreciated-follow video swallow evaluation  As per GI note, patient does not want EGD I discussed with patient again if he wants to have the EGD but he kind of went around the [place]  Will crushes pills  Will discuss with son will monitor for another day but there is no further workup warranted  S/P esophagogram on 11/19:Limited examination revealing no gross hypopharyngeal or esophageal  abnormalities  He had another 1 today did not show any physical abnormalities  See above management  Actually had a discussion with the patient regarding the PEG tube as his PCP recommended as well patient declined

## 2020-07-03 NOTE — PLAN OF CARE
Problem: Potential for Falls  Goal: Patient will remain free of falls  Description  INTERVENTIONS:  - Assess patient frequently for physical needs  -  Identify cognitive and physical deficits and behaviors that affect risk of falls  -  [place] fall precautions as indicated by assessment   - Educate patient/family on patient safety including physical limitations  - Instruct patient to call for assistance with activity based on assessment  - Modify environment to reduce risk of injury  - Consider OT/PT consult to assist with strengthening/mobility  Outcome: Progressing     Problem: Prexisting or High Potential for Compromised Skin Integrity  Goal: Skin integrity is maintained or improved  Description  INTERVENTIONS:  - Identify patients at risk for skin breakdown  - Assess and monitor skin integrity  - Assess and monitor nutrition and hydration status  - Monitor labs   - Assess for incontinence   - Turn and reposition patient  - Assist with mobility/ambulation  - Relieve pressure over bony prominences  - Avoid friction and shearing  - Provide appropriate hygiene as needed including keeping skin clean and dry  - Evaluate need for skin moisturizer/barrier cream  - Collaborate with interdisciplinary team   - Patient/family teaching  - Consider wound care consult   Outcome: Progressing     Problem: Nutrition/Hydration-ADULT  Goal: Nutrient/Hydration intake appropriate for improving, restoring or maintaining nutritional needs  Description  Monitor and assess patient's nutrition/hydration status for malnutrition  Collaborate with interdisciplinary team and initiate plan and interventions as ordered  Monitor patient's weight and dietary intake as ordered or per policy  Utilize nutrition screening tool and intervene as necessary  Determine patient's food preferences and provide high-protein, high-caloric foods as appropriate       INTERVENTIONS:  - Monitor oral intake, urinary output, labs, and treatment plans  - Assess nutrition and hydration status and recommend course of action  - Evaluate amount of meals eaten  - Assist patient with eating if necessary   - Allow adequate time for meals  - Recommend/ encourage appropriate diets, oral nutritional supplements, and vitamin/mineral supplements  - Order, calculate, and assess calorie counts as needed  - Recommend, monitor, and adjust tube feedings and TPN/PPN based on assessed needs  - Assess need for intravenous fluids  - Provide specific nutrition/hydration education as appropriate  - Include patient/family/caregiver in decisions related to nutrition  Outcome: Progressing

## 2020-07-03 NOTE — ASSESSMENT & PLAN NOTE
· AC region yesterday when fluids were infusing fluids were discontinued IV was removed he had warm compresses suspected filtration today it is much better

## 2020-07-03 NOTE — ASSESSMENT & PLAN NOTE
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

## 2020-07-03 NOTE — ASSESSMENT & PLAN NOTE
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

## 2020-07-03 NOTE — ASSESSMENT & PLAN NOTE
Return to [facility] with skilled physical therapy  High risk for pressure ulcer  Follow-up PT OT  Change position every 2 hours  Follow dietitian recommendation await recommendation by physical therapy but he will need some rehab

## 2020-07-03 NOTE — SPEECH THERAPY NOTE
Speech/Language Pathology Note    Patient Name: Yong Samuel  [id number] Date: 7/3/2020    Subjective:  Pt was awake and alert, sitting up in bed and hesitant to participate in skilled ST     Objective:  Pt seen for diagnostic swallow therapy  Diet is puree and thin liquid  Pt continues to state he is scared to swallow  SLP reinforced he is able to swallow puree without concern choking (VBS completed prior) and no aspiration seen on video with liquids  Pt feels there is a "lump" in his throat  He was educated this is likely a heightened sensation when swallowing but reassured him there is no obstruction to bolus flow  Pt ate 1/2 pudding without difficulty, and took 1 drink of water without s/s aspiration  He then refused to eat/drink any more  Assessment:  Images are now available in PACS for review as desired  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

## 2020-07-03 NOTE — ASSESSMENT & PLAN NOTE
Malnutrition Findings:   Malnutrition type: Chronic illness  Degree of Malnutrition: Other severe protein calorie malnutrition(related to oropharyngeal dysphagia as evidenced by < 75% energy intake > 1 mo, severe muscle wasting noted at pectoralis, deltoid, interosseous muscles, moderate fat loss at orbitals and thoracic region, 16 1% wt loss x 8 mo (10/1/19 143#, 6/27/20 120#))    BMI Findings:  BMI Classifications: Underweight < 18 5     Body mass index is 18 51 kg/m²

## 2020-07-03 NOTE — ASSESSMENT & PLAN NOTE
No results found for: HGBA1C    Recent Labs     07/01/20  0718 07/01/20  1100 07/01/20  2043 07/02/20  1528   POCGLU 92 118 102 96       Blood Sugar Average: Last 72 hrs:  (P) 101 625 sliding scale  Continue gabapentin  Controlled

## 2020-07-03 NOTE — ASSESSMENT & PLAN NOTE
TSH is normal  Continue nutritional supplement  Continue diet  Speech and swallow, nutritional consult appreciated  Follow-up PT OT recommendation  Patient's poor p o  Intake with feeling of dysphagia suspect secondary to increased anxiety he did okay with breakfast and dinner yesterday that he was complaining that he feels that the pill is stuck sensation  Reviewed input done by Psychiatry  Will make some adjustments a but he did gain weight since he has been here  I did have a detailed discussion with his primary care physician yesterday Dr Koko Vann- in agreement that this is psychological   Patient is unable to return his assisted living as they are not able to accommodate his diet patient does need a 24 hour supervision and speech continuation working  He has been eating 50% of the meals and his meds are being crushed without any issue although he at times does still complain certain things  In BUN decreased today post fluids yesterday  Discussed with son in detail and they are in agreement that patient will go to [facility] NH on discharge    Will monitor labs in the morning  Continue strict I's and O's and daily weights

## 2020-07-03 NOTE — PROGRESS NOTES
Progress Note - Sanchez Zimmerman 1940, 68 y o  male MRN: [id number]    Unit/Bed#: -01 Encounter: [id number]    Primary Care Provider: Yohana Humphreys MD   Date and time admitted to hospital: 6/27/2020 12:53 PM        Localized swelling of right upper extremity  Assessment & Plan  · AC region yesterday when fluids were infusing fluids were discontinued IV was removed he had warm compresses suspected filtration today it is much better  Left ventricular apical thrombus  Assessment & Plan  · I cannot find any records revealing that this is a chronic thrombus as discussed with Cardiology possibly had in the past   Had a 2D echo he does have an LV thrombus yesterday recommendation for at least for the thrombus 3 months anticoagulation but he does have atrial fibrillation  · Increase Coumadin to 5 mg daily INR tomorrow continue heparin and Coumadin bridge till INR is 2    Severe protein-calorie malnutrition (HCC)  Assessment & Plan  Malnutrition Findings:   Malnutrition type: Chronic illness  Degree of Malnutrition: Other severe protein calorie malnutrition(related to oropharyngeal dysphagia as evidenced by < 75% energy intake > 1 mo, severe muscle wasting noted at pectoralis, deltoid, interosseous muscles, moderate fat loss at orbitals and thoracic region, 16 1% wt loss x 8 mo (10/1/19 143#, 6/27/20 120#))    BMI Findings:  BMI Classifications: Underweight < 18 5     Body mass index is 18 51 kg/m²         Mixed obsessional thoughts and acts  Assessment & Plan  Severe nature  Continue current treatment and management    Physical deconditioning  Assessment & Plan  Return to [facility] with skilled physical therapy  High risk for pressure ulcer  Follow-up PT OT  Change position every 2 hours  Follow dietitian recommendation await recommendation by physical therapy but he will need some rehab    Chronic combined systolic and diastolic congestive heart failure ([address] )  Assessment & Plan  · 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)  Assessment & Plan  No results found for: HGBA1C    Recent Labs     07/01/20  0718 07/01/20  1100 07/01/20  2043 07/02/20  1528   POCGLU 92 118 102 96       Blood Sugar Average: Last 72 hrs:  (P) 101 625 sliding scale  Continue gabapentin  Controlled    Urinary retention  Assessment & Plan  History of urinary retention  Follow urinary retention protocol  No issues    Anxiety  Assessment & Plan  Continue home medication  Uncontrolled that is causing him not T eat much as he is scared to eat  Consulted Psychiatry to see if any medication needs to be adjusted  Reviewed recommendation all actually place Xanax 30 minutes prior to each meal to see if that helps    Permanent atrial fibrillation [facility])  Assessment & Plan    Secondary to LV thrombus he is currently on heparin to Coumadin bridge INR should be between 2-3 discussed with Cardiology as well  INR still subtherapeutic will increase Coumadin to 5 mg daily started 7/2 with heparin bridge  INR 2-3  Continue metoprolol    Generalized weakness  Assessment & Plan  Secondary to multiple factors  Continue nutritional supplement  He did okay with breakfast he was supposed to get lunch when I saw him discussed with nursing there is no issues with dysphagia there is no physical findings a for now as he also refused EGD but the barium swallow esophagram was normal he is to continue puree diet    Suspect all this is secondary to his anxieties as he is scared to eat   He will need some rehab  Follow PT OT, speech evaluation- final disposition to nursing home    Failure to thrive in adult  Assessment & Plan  TSH is normal  Continue nutritional supplement  Continue diet  Speech and swallow, nutritional consult appreciated  Follow-up PT OT recommendation  Patient's poor p o  Intake with feeling of dysphagia suspect secondary to increased anxiety he did okay with breakfast and dinner yesterday that he was complaining that he feels that the pill is stuck sensation  Reviewed input done by Psychiatry  Will make some adjustments a but he did gain weight since he has been here  I did have a detailed discussion with his primary care physician yesterday Dr Malachi Miranda- in agreement that this is psychological   Patient is unable to return his assisted living as they are not able to accommodate his diet patient does need a 24 hour supervision and speech continuation working  He has been eating 50% of the meals and his meds are being crushed without any issue although he at times does still complain certain things  In BUN decreased today post fluids yesterday  Discussed with son in detail and they are in agreement that patient will go to [facility] NH on discharge  Will monitor labs in the morning  Continue strict I's and O's and daily weights    * Oropharyngeal dysphagia  Assessment & Plan  I suspect secondary to his increased anxiety as he is scared to eat but there is no with seeing of dysphagia he did okay with breakfast and he did okay with dinner without any issues and medications yesterday today he was complaining of after he is taking the med that he feeling this is taking sensation show discussed with nursing that will crush the meds     Also he does not have any teeth so he will continue modified diet puree  I did ask Psychiatry to evaluate the patient as well  Psychiatry evaluated yesterday I will not increase his nighttime Remeron secondary to some hypotension today    But I will add Xanax instead of p r n  Anxiety is p r n  30 minutes prior to food as he does feel anxious to eat  But he has been gaining weight while he is here continue pureed diet he is also does not have any dentures  Follow dysphagia diet  Follow aspiration precaution  GI, speech and swallow recommendation appreciated-follow video swallow evaluation  As per GI note, patient does not want EGD I discussed with patient again if he wants to have the EGD but he kind of went around the [place]  Will crushes pills  Will discuss with son will monitor for another day but there is no further workup warranted  S/P esophagogram on :Limited examination revealing no gross hypopharyngeal or esophageal  abnormalities  He had another 1 today did not show any physical abnormalities  See above management  Actually had a discussion with the patient regarding the PEG tube as his PCP recommended as well patient declined  VTE Pharmacologic Prophylaxis:   Pharmacologic: Heparin Drip  Mechanical VTE Prophylaxis in Place: Yes    Patient Centered Rounds: I have performed bedside rounds with nursing staff today  Discussions with Specialists or Other Care Team Provider:  Discussed with the nursing    Education and Discussions with Family / Patient:  Patient and I did discuss in detail with son yesterday    Time Spent for Care: 30 minutes  More than 50% of total time spent on counseling and coordination of care as described above      Current Length of Stay: 6 day(s)    Current Patient Status: Inpatient   Certification Statement: The patient will continue to require additional inpatient hospital stay due to Placement and also subtherapeutic INR    Discharge Plan:  Anticipate discharge next week    Code Status: Level 3 - DNAR and DNI      Subjective:   Patient seen and examined complaining he is thinking to much    Objective:     Vitals:   Temp (24hrs), Av °F (36 7 °C), Min:97 8 °F (36 6 °C), Max:98 1 °F (36 7 °C)    Temp:  [97 8 °F (36 6 °C)-98 1 °F (36 7 °C)] 98 1 °F (36 7 °C)  HR:  [58-79] 58  Resp:  [12-18] 12  BP: (129-154)/(53-81) 129/53  SpO2:  [98 %-100 %] 100 %  Body mass index is 18 51 kg/m²  Input and Output Summary (last 24 hours): Intake/Output Summary (Last 24 hours) at 7/3/2020 1142  Last data filed at 7/3/2020 0800  Gross per 24 hour   Intake 1061 11 ml   Output 450 ml   Net 611 11 ml       Physical Exam:     Physical Exam   Constitutional: He is oriented to person, place, and time  Malnourished   HENT:   Head: Normocephalic and atraumatic  Eyes: Pupils are equal, round, and reactive to light  EOM are normal    Neck: Normal range of motion  Cardiovascular: Normal rate, regular rhythm and normal heart sounds  Pulmonary/Chest: Effort normal and breath sounds normal  No stridor  No respiratory distress  Abdominal: Soft  Bowel sounds are normal    Musculoskeletal: Normal range of motion  He exhibits edema (Right AC region mild improved from yesterday)  Neurological: He is alert and oriented to person, place, and time  He has normal reflexes  cranial nerve 2-12 are normal   Normal neurological exam   Skin: Skin is warm  Psychiatric: He has a normal mood and affect           Additional Data:     Labs:    Results from last 7 days   Lab Units 06/29/20  1752 06/27/20  1324   WBC Thousand/uL 5 93 3 39*   HEMOGLOBIN g/dL 12 6 12 8   HEMATOCRIT % 37 3 37 3   PLATELETS Thousands/uL 115* 111*   NEUTROS PCT %  --  55   LYMPHS PCT %  --  26   MONOS PCT %  --  18*   EOS PCT %  --  1     Results from last 7 days   Lab Units 07/03/20  0459  06/29/20  1002   SODIUM mmol/L 144   < > 144   POTASSIUM mmol/L 3 8   < > 3 9   CHLORIDE mmol/L 109*   < > 109*   CO2 mmol/L 32   < > 29   BUN mg/dL 31*   < > 17   CREATININE mg/dL 1 03   < > 1 05   ANION GAP mmol/L 3*   < > 6   CALCIUM mg/dL 9 0   < > 9 1   ALBUMIN g/dL  --   --  3 5   TOTAL BILIRUBIN mg/dL  --   --  0 46   ALK PHOS U/L  --   --  82   ALT U/L  -- --  13   AST U/L  --   --  18   GLUCOSE RANDOM mg/dL 89   < > 108    < > = values in this interval not displayed  Results from last 7 days   Lab Units 07/03/20  0459   INR  1 73*     Results from last 7 days   Lab Units 07/02/20  1528 07/01/20  2043 07/01/20  1100 07/01/20  0718 06/30/20  2105 06/30/20  1631 06/30/20  1139 06/30/20  0734 06/29/20  2153 06/29/20  1619 06/29/20  1047 06/29/20  0729   POC GLUCOSE mg/dl 96 102 118 92 100 103 119 83 84 85 100 97                   * I Have Reviewed All Lab Data Listed Above  * Additional Pertinent Lab Tests Reviewed:  All Labs Within Last 24 Hours Reviewed    Imaging:    Imaging Reports Reviewed Today Include:  None today  Imaging Personally Reviewed by Myself Includes:      Recent Cultures (last 7 days):           Last 24 Hours Medication List:     Current Facility-Administered Medications:  acetaminophen 650 mg Oral Q6H PRN Case Raymond MD    ALPRAZolam 0 25 mg Oral TID PRN Jenny Asencio MD    aluminum-magnesium hydroxide-simethicone 30 mL Oral Q6H PRN Case Raymond MD    aspirin 81 mg Oral Daily Case Raymond MD    atorvastatin 10 mg Oral Daily Case Raymond MD    barium sulfate 1 tablet Oral Once in imaging Natacha Hatfield MD    docusate sodium 100 mg Oral BID Case Raymond MD    ergocalciferol 50,000 Units Oral Weekly Case Raymond MD    gabapentin 100 mg Oral HS Case Raymond MD    heparin (porcine) 3-20 Units/kg/hr (Order-Specific) Intravenous Titrated Case Raymond MD Last Rate: 13 Units/kg/hr (07/02/20 2141)   heparin (porcine) 1,500 Units Intravenous Q1H PRN Case Raymond MD    heparin (porcine) 3,000 Units Intravenous Q1H PRN Case Raymond MD    levothyroxine 50 mcg Oral Daily Case Raymond MD    lisinopril 5 mg Oral Daily Jenny Asencio MD    metoprolol tartrate 25 mg Oral QAM Jenny Asencio MD    mirtazapine 15 mg Oral HS Genevieve Mckinnon MD    nitroglycerin 0 4 mg Sublingual Q5 Min PRN Genevieve Mckinnon MD    ondansetron 4 mg Intravenous Q6H PRN Case Raymond MD    pantoprazole 40 mg Oral Daily Case Raymond MD    potassium chloride 20 mEq Oral Daily Case Raymond MD    QUEtiapine 50 mg Oral HS Case Raymond MD    rOPINIRole 0 5 mg Oral HS Case Raymond MD    sertraline 25 mg Oral QAM Genevieve Mckinnon MD    sodium chloride (PF) 3 mL Intravenous Q1H PRN Josesito Singh MD    sucralfate 1 g Oral BID Case Raymond MD    tamsulosin 0 4 mg Oral Daily With Diomedes Chin MD    warfarin 5 mg Oral Daily (warfarin) Jenny Asencio MD         Today, Patient Was Seen By: Jenny Asencio MD    ** Please Note: Dictation voice to text software may have been used in the creation of this document   **

## 2020-07-04 LAB
ANION GAP SERPL CALCULATED.3IONS-SCNC: 5 MMOL/L (ref 4–13)
APTT PPP: 25 SECONDS (ref 23–37)
APTT PPP: 95 SECONDS (ref 23–37)
BUN SERPL-MCNC: 30 MG/DL (ref 5–25)
CALCIUM SERPL-MCNC: 8.7 MG/DL (ref 8.3–10.1)
CHLORIDE SERPL-SCNC: 109 MMOL/L (ref 100–108)
CO2 SERPL-SCNC: 30 MMOL/L (ref 21–32)
CREAT SERPL-MCNC: 0.96 MG/DL (ref 0.6–1.3)
GFR SERPL CREATININE-BSD FRML MDRD: 75 ML/MIN/1.73SQ M
GLUCOSE SERPL-MCNC: 95 MG/DL (ref 65–140)
INR PPP: 1.69 (ref 0.84–1.19)
POTASSIUM SERPL-SCNC: 3.8 MMOL/L (ref 3.5–5.3)
PROTHROMBIN TIME: 20.1 SECONDS (ref 11.6–14.5)
SODIUM SERPL-SCNC: 144 MMOL/L (ref 136–145)

## 2020-07-04 PROCEDURE — 99232 SBSQ HOSP IP/OBS MODERATE 35: CPT | Performed by: FAMILY MEDICINE

## 2020-07-04 PROCEDURE — 85730 THROMBOPLASTIN TIME PARTIAL: CPT | Performed by: FAMILY MEDICINE

## 2020-07-04 PROCEDURE — 80048 BASIC METABOLIC PNL TOTAL CA: CPT | Performed by: FAMILY MEDICINE

## 2020-07-04 PROCEDURE — 85610 PROTHROMBIN TIME: CPT | Performed by: FAMILY MEDICINE

## 2020-07-04 RX ORDER — WARFARIN SODIUM 3 MG/1
6 TABLET ORAL
Status: DISCONTINUED | OUTPATIENT
Start: 2020-07-04 | End: 2020-07-06

## 2020-07-04 RX ADMIN — TAMSULOSIN HYDROCHLORIDE 0.4 MG: 0.4 CAPSULE ORAL at 17:12

## 2020-07-04 RX ADMIN — ENOXAPARIN SODIUM 60 MG: 60 INJECTION SUBCUTANEOUS at 17:12

## 2020-07-04 RX ADMIN — MIRTAZAPINE 15 MG: 15 TABLET, FILM COATED ORAL at 21:38

## 2020-07-04 RX ADMIN — ROPINIROLE HYDROCHLORIDE 0.5 MG: 0.25 TABLET, FILM COATED ORAL at 21:39

## 2020-07-04 RX ADMIN — SUCRALFATE 1 G: 1 TABLET ORAL at 09:17

## 2020-07-04 RX ADMIN — ERGOCALCIFEROL 50000 UNITS: 1.25 CAPSULE ORAL at 09:17

## 2020-07-04 RX ADMIN — WARFARIN SODIUM 6 MG: 3 TABLET ORAL at 17:12

## 2020-07-04 RX ADMIN — DOCUSATE SODIUM 100 MG: 100 CAPSULE, LIQUID FILLED ORAL at 17:12

## 2020-07-04 RX ADMIN — PANTOPRAZOLE SODIUM 40 MG: 40 TABLET, DELAYED RELEASE ORAL at 09:17

## 2020-07-04 RX ADMIN — ASPIRIN 81 MG: 81 TABLET, COATED ORAL at 09:16

## 2020-07-04 RX ADMIN — METOPROLOL TARTRATE 25 MG: 25 TABLET, FILM COATED ORAL at 09:16

## 2020-07-04 RX ADMIN — SUCRALFATE 1 G: 1 TABLET ORAL at 17:12

## 2020-07-04 RX ADMIN — GABAPENTIN 100 MG: 100 CAPSULE ORAL at 21:38

## 2020-07-04 RX ADMIN — LISINOPRIL 5 MG: 5 TABLET ORAL at 09:17

## 2020-07-04 RX ADMIN — DOCUSATE SODIUM 100 MG: 100 CAPSULE, LIQUID FILLED ORAL at 09:17

## 2020-07-04 RX ADMIN — POTASSIUM CHLORIDE 20 MEQ: 20 SOLUTION ORAL at 09:17

## 2020-07-04 RX ADMIN — LEVOTHYROXINE SODIUM 50 MCG: 50 TABLET ORAL at 09:16

## 2020-07-04 RX ADMIN — ATORVASTATIN CALCIUM 10 MG: 10 TABLET, FILM COATED ORAL at 09:17

## 2020-07-04 RX ADMIN — SERTRALINE HYDROCHLORIDE 25 MG: 25 TABLET ORAL at 09:17

## 2020-07-04 RX ADMIN — QUETIAPINE FUMARATE 50 MG: 25 TABLET ORAL at 21:39

## 2020-07-04 NOTE — PROGRESS NOTES
Progress Note - Mimi Black 1940, 68 y o  male MRN: [id number]    Unit/Bed#: -01 Encounter: [id number]    Primary Care Provider: Alistair Vogel MD   Date and time admitted to hospital: 6/27/2020 12:53 PM        Localized swelling of right upper extremity  Assessment & Plan  · AC region yesterday when fluids were infusing fluids were discontinued IV was removed he had warm compresses suspected filtration today it is much better  Left ventricular apical thrombus  Assessment & Plan  · I cannot find any records revealing that this is a chronic thrombus as discussed with Cardiology possibly had in the past   Had a 2D echo he does have an LV thrombus yesterday recommendation for at least for the thrombus 3 months anticoagulation but he does have atrial fibrillation  · Increase Coumadin to 5 mg daily INR tomorrow continue lovenox  and Coumadin bridge till INR is 2    Severe protein-calorie malnutrition (HCC)  Assessment & Plan  Malnutrition Findings:   Malnutrition type: Chronic illness  Degree of Malnutrition: Other severe protein calorie malnutrition(related to oropharyngeal dysphagia as evidenced by < 75% energy intake > 1 mo, severe muscle wasting noted at pectoralis, deltoid, interosseous muscles, moderate fat loss at orbitals and thoracic region, 16 1% wt loss x 8 mo (10/1/19 143#, 6/27/20 120#))    BMI Findings:  BMI Classifications: Underweight < 18 5     Body mass index is 18 51 kg/m²         Mixed obsessional thoughts and acts  Assessment & Plan  Severe nature  Continue current treatment and management    Physical deconditioning  Assessment & Plan  Return to [facility] with skilled physical therapy  High risk for pressure ulcer  Follow-up PT OT  Change position every 2 hours  Follow dietitian recommendation await recommendation by physical therapy but he will need some rehab    Chronic combined systolic and diastolic congestive heart failure ([address] )  Assessment & Plan  · 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   As reviewed per records  · He is compensated  He is actually failure to thrive no need for any diuretics  He is EF is 35-40% with grade 3 diastolic dysfunction on the echocardiogram    · Initially lisinopril was lowered to 5 mg daily secondary to low blood pressure in a blood pressure stable continue metoprolol as well  Bipolar 1 disorder, depressed ([facility] [address] )  Assessment & Plan  Continue home medication      Type 2 diabetes mellitus with diabetic neuropathy, without long-term current use of insulin [facility])  Assessment & Plan  No results found for: HGBA1C    Recent Labs     07/01/20  2043 07/02/20  1528   POCGLU 102 96       Blood Sugar Average: Last 72 hrs:  (P) 102 sliding scale  Continue gabapentin  Controlled    Urinary retention  Assessment & Plan  History of urinary retention  Follow urinary retention protocol  No issues    Anxiety  Assessment & Plan  Continue home medication  Uncontrolled that is causing him not T eat much as he is scared to eat  Consulted Psychiatry to see if any medication needs to be adjusted  Reviewed recommendation all actually place Xanax 30 minutes prior to each meal to see if that helps    Permanent atrial fibrillation [facility])  Assessment & Plan    Secondary to LV thrombus he is currently on Lovenox to Coumadin bridge INR should be between 2-3 discussed with Cardiology as well  INR still subtherapeutic will increase Coumadin to 6 mg daily started 7/2 with heparin bridge  INR 2-3  Continue metoprolol  Will discontinue heparin and switch him to Lovenox bridge as he is getting heart stick all in bruises from multiple blood draws      Generalized weakness  Assessment & Plan  Secondary to multiple factors  Continue nutritional supplement  Eating weight gain to 128 lb   Follow PT OT, speech evaluation- final disposition to nursing home      Failure to thrive in adult  Assessment & Plan  TSH is normal  Continue nutritional supplement  Continue diet  Speech and swallow, nutritional consult appreciated  Follow-up PT OT recommendation  Patient's poor p o  Intake with feeling of dysphagia suspect secondary to increased anxiety he did okay with breakfast and dinner yesterday that he was complaining that he feels that the pill is stuck sensation  Reviewed input done by Psychiatry  Will make some adjustments a but he did gain weight since he has been here  I did have a detailed discussion with his primary care physician yesterday Dr Nemesio Holbrook- in agreement that this is psychological   Patient is unable to return his assisted living as they are not able to accommodate his diet patient does need a 24 hour supervision and speech continuation working  He has been eating 50% of the meals and his meds are being crushed without any issue although he at times does still complain certain things  In BUN decreased today post fluids yesterday  Discussed with son in detail and they are in agreement that patient will go to [facility] on discharge  Bun down   Continue strict I's and O's and daily weights    * Oropharyngeal dysphagia  Assessment & Plan  I suspect secondary to his increased anxiety as he is scared to eat but there is no with seeing of dysphagia he did okay with breakfast and he did okay with dinner without any issues and medications yesterday today he was complaining of after he is taking the med that he feeling this is taking sensation show discussed with nursing that will crush the meds     Also he does not have any teeth so he will continue modified diet puree  I did ask Psychiatry to evaluate the patient as well  Psychiatry evaluated yesterday I will not increase his nighttime Remeron secondary to some hypotension today  But I will add Xanax instead of p r n  Anxiety is p r n  30 minutes prior to food as he does feel anxious to eat  But he has been gaining weight while he is here continue pureed diet he is also does not have any dentures    Follow dysphagia diet  Follow aspiration precaution  GI, speech and swallow recommendation appreciated-follow video swallow evaluation  As per GI note, patient does not want EGD I discussed with patient again if he wants to have the EGD but he kind of went around the [place]  Will crushes pills  Will discuss with son will monitor for another day but there is no further workup warranted  S/P esophagogram on :Limited examination revealing no gross hypopharyngeal or esophageal  abnormalities  He had another 1 today did not show any physical abnormalities  See above management  Actually had a discussion with the patient regarding the PEG tube as his PCP recommended as well patient declined  Weight up to 128 lb        VTE Pharmacologic Prophylaxis:   Pharmacologic: Enoxaparin (Lovenox)  Mechanical VTE Prophylaxis in Place: Yes    Patient Centered Rounds: I have performed bedside rounds with nursing staff today  Discussions with Specialists or Other Care Team Provider:  Discussed with cm    Education and Discussions with Family / Patient:  Patient    Time Spent for Care: 30 minutes  More than 50% of total time spent on counseling and coordination of care as described above  Current Length of Stay: 7 day(s)    Current Patient Status: Inpatient   Certification Statement: The patient will continue to require additional inpatient hospital stay due to Placement    Discharge Plan:  Awaiting placement    Code Status: Level 3 - DNAR and DNI      Subjective:   Patient seen and examined, feels anxious but no issues eating eating 50%  No chest pain or shortness of breath    Objective:     Vitals:   Temp (24hrs), Av 5 °F (36 9 °C), Min:98 2 °F (36 8 °C), Max:98 7 °F (37 1 °C)    Temp:  [98 2 °F (36 8 °C)-98 7 °F (37 1 °C)] 98 2 °F (36 8 °C)  HR:  [56-64] 64  Resp:  [17] 17  BP: (126-128)/(54-63) 128/63  SpO2:  [100 %] 100 %  Body mass index is 18 51 kg/m²  Input and Output Summary (last 24 hours):        Intake/Output Summary (Last 24 hours) at 7/4/2020 1136  Last data filed at 7/4/2020 0100  Gross per 24 hour   Intake 371 63 ml   Output 350 ml   Net 21 63 ml       Physical Exam:     Physical Exam   Constitutional: He is oriented to person, place, and time  Cachectic   HENT:   Head: Normocephalic and atraumatic  Eyes: Pupils are equal, round, and reactive to light  EOM are normal    Neck: Normal range of motion  Cardiovascular: Normal rate, regular rhythm and normal heart sounds  Pulmonary/Chest: Effort normal and breath sounds normal    Abdominal: Soft  Bowel sounds are normal    Musculoskeletal: Normal range of motion  Neurological: He is alert and oriented to person, place, and time  He has normal reflexes  cranial nerve 2-12 are normal   Normal neurological exam   Skin: Skin is warm  Psychiatric: He has a normal mood and affect  Additional Data:     Labs:    Results from last 7 days   Lab Units 06/29/20  1752 06/27/20  1324   WBC Thousand/uL 5 93 3 39*   HEMOGLOBIN g/dL 12 6 12 8   HEMATOCRIT % 37 3 37 3   PLATELETS Thousands/uL 115* 111*   NEUTROS PCT %  --  55   LYMPHS PCT %  --  26   MONOS PCT %  --  18*   EOS PCT %  --  1     Results from last 7 days   Lab Units 07/04/20  0556  06/29/20  1002   SODIUM mmol/L 144   < > 144   POTASSIUM mmol/L 3 8   < > 3 9   CHLORIDE mmol/L 109*   < > 109*   CO2 mmol/L 30   < > 29   BUN mg/dL 30*   < > 17   CREATININE mg/dL 0 96   < > 1 05   ANION GAP mmol/L 5   < > 6   CALCIUM mg/dL 8 7   < > 9 1   ALBUMIN g/dL  --   --  3 5   TOTAL BILIRUBIN mg/dL  --   --  0 46   ALK PHOS U/L  --   --  82   ALT U/L  --   --  13   AST U/L  --   --  18   GLUCOSE RANDOM mg/dL 95   < > 108    < > = values in this interval not displayed       Results from last 7 days   Lab Units 07/04/20  0601   INR  1 69*     Results from last 7 days   Lab Units 07/02/20  1528 07/01/20  2043 07/01/20  1100 07/01/20  2131 06/30/20  2105 06/30/20  1631 06/30/20  1139 06/30/20  0734 06/29/20  2153 06/29/20  1619 06/29/20  1047 06/29/20  0729   POC GLUCOSE mg/dl 96 102 118 92 100 103 119 83 84 85 100 97                   * I Have Reviewed All Lab Data Listed Above  * Additional Pertinent Lab Tests Reviewed:  All Labs Within Last 24 Hours Reviewed    Imaging:    Imaging Reports Reviewed Today Include: none today   Imaging Personally Reviewed by Myself Includes:      Recent Cultures (last 7 days):           Last 24 Hours Medication List:     Current Facility-Administered Medications:  acetaminophen 650 mg Oral Q6H PRN Adriana Rosales MD   ALPRAZolam 0 25 mg Oral TID PRN Celia Srivastava MD   aluminum-magnesium hydroxide-simethicone 30 mL Oral Q6H PRN Adriana Rosales MD   aspirin 81 mg Oral Daily Adriana Rosales MD   atorvastatin 10 mg Oral Daily Adriana Rosales MD   barium sulfate 1 tablet Oral Once in imaging Clive Martínez MD   docusate sodium 100 mg Oral BID Adriana Rosales MD   enoxaparin 1 mg/kg Subcutaneous Q12H [address] Cleia Srivastava MD   ergocalciferol 50,000 Units Oral Weekly Adriana Rosales MD   gabapentin 100 mg Oral HS Adriana Rosales MD   levothyroxine 50 mcg Oral Daily Adriana Rosales MD   lisinopril 5 mg Oral Daily Celia Srivastava MD   metoprolol tartrate 25 mg Oral QAM Celia Srivastava MD   mirtazapine 15 mg Oral HS Genevieve Mckinnon MD   nitroglycerin 0 4 mg Sublingual Q5 Min PRN Adriana Rosales MD   ondansetron 4 mg Intravenous Q6H PRN Adriana Rosales MD   pantoprazole 40 mg Oral Daily Adriana Rosales MD   potassium chloride 20 mEq Oral Daily Adriana Rosales MD   QUEtiapine 50 mg Oral HS Adriana Rosales MD   rOPINIRole 0 5 mg Oral HS Adriana Rosales MD   sertraline 25 mg Oral QAM Adriana Rosales MD   sodium chloride (PF) 3 mL Intravenous Q1H PRN Reynold Rene MD   sucralfate 1 g Oral BID Adriana Rosales MD   tamsulosin 0 4 mg Oral Daily With Amanuel Mcwilliams MD   warfarin 6 mg Oral Daily (warfarin) Celia Srivastava MD        Today, Patient Was Seen By: Celia Srivastava MD    ** Please Note: Dictation voice to text software may have been used in the creation of this document   **

## 2020-07-04 NOTE — ASSESSMENT & PLAN NOTE
Secondary to multiple factors  Continue nutritional supplement  Eating weight gain to 128 lb   Follow PT OT, speech evaluation- final disposition to nursing home

## 2020-07-04 NOTE — ASSESSMENT & PLAN NOTE
TSH is normal  Continue nutritional supplement  Continue diet  Speech and swallow, nutritional consult appreciated  Follow-up PT OT recommendation  Patient's poor p o  Intake with feeling of dysphagia suspect secondary to increased anxiety he did okay with breakfast and dinner yesterday that he was complaining that he feels that the pill is stuck sensation  Reviewed input done by Psychiatry  Will make some adjustments a but he did gain weight since he has been here  I did have a detailed discussion with his primary care physician yesterday Dr Malachi Miranda- in agreement that this is psychological   Patient is unable to return his assisted living as they are not able to accommodate his diet patient does need a 24 hour supervision and speech continuation working  He has been eating 50% of the meals and his meds are being crushed without any issue although he at times does still complain certain things  In BUN decreased today post fluids yesterday  Discussed with son in detail and they are in agreement that patient will go to [facility] on discharge    Bun down   Continue strict I's and O's and daily weights

## 2020-07-04 NOTE — ASSESSMENT & PLAN NOTE
No results found for: HGBA1C    Recent Labs     07/01/20 2043 07/02/20  1528   POCGLU 102 96       Blood Sugar Average: Last 72 hrs:  (P) 102 sliding scale  Continue gabapentin  Controlled

## 2020-07-04 NOTE — PLAN OF CARE
Problem: Potential for Falls  Goal: Patient will remain free of falls  Description  INTERVENTIONS:  - Assess patient frequently for physical needs  -  Identify cognitive and physical deficits and behaviors that affect risk of falls  -  [place] fall precautions as indicated by assessment   - Educate patient/family on patient safety including physical limitations  - Instruct patient to call for assistance with activity based on assessment  - Modify environment to reduce risk of injury  - Consider OT/PT consult to assist with strengthening/mobility  Outcome: Progressing     Problem: Prexisting or High Potential for Compromised Skin Integrity  Goal: Skin integrity is maintained or improved  Description  INTERVENTIONS:  - Identify patients at risk for skin breakdown  - Assess and monitor skin integrity  - Assess and monitor nutrition and hydration status  - Monitor labs   - Assess for incontinence   - Turn and reposition patient  - Assist with mobility/ambulation  - Relieve pressure over bony prominences  - Avoid friction and shearing  - Provide appropriate hygiene as needed including keeping skin clean and dry  - Evaluate need for skin moisturizer/barrier cream  - Collaborate with interdisciplinary team   - Patient/family teaching  - Consider wound care consult   Outcome: Progressing     Problem: Nutrition/Hydration-ADULT  Goal: Nutrient/Hydration intake appropriate for improving, restoring or maintaining nutritional needs  Description  Monitor and assess patient's nutrition/hydration status for malnutrition  Collaborate with interdisciplinary team and initiate plan and interventions as ordered  Monitor patient's weight and dietary intake as ordered or per policy  Utilize nutrition screening tool and intervene as necessary  Determine patient's food preferences and provide high-protein, high-caloric foods as appropriate       INTERVENTIONS:  - Monitor oral intake, urinary output, labs, and treatment plans  - Assess nutrition and hydration status and recommend course of action  - Evaluate amount of meals eaten  - Assist patient with eating if necessary   - Allow adequate time for meals  - Recommend/ encourage appropriate diets, oral nutritional supplements, and vitamin/mineral supplements  - Order, calculate, and assess calorie counts as needed  - Recommend, monitor, and adjust tube feedings and TPN/PPN based on assessed needs  - Assess need for intravenous fluids  - Provide specific nutrition/hydration education as appropriate  - Include patient/family/caregiver in decisions related to nutrition  Outcome: Progressing

## 2020-07-04 NOTE — ASSESSMENT & PLAN NOTE
Secondary to LV thrombus he is currently on Lovenox to Coumadin bridge INR should be between 2-3 discussed with Cardiology as well  INR still subtherapeutic will increase Coumadin to 6 mg daily started 7/2 with heparin bridge  INR 2-3  Continue metoprolol  Will discontinue heparin and switch him to Lovenox bridge as he is getting heart stick all in bruises from multiple blood draws

## 2020-07-04 NOTE — ASSESSMENT & PLAN NOTE
Return to [facility] with skilled physical therapy  High risk for pressure ulcer  Follow-up PT OT  Change position every 2 hours  Follow dietitian recommendation await recommendation by physical therapy but he will need some rehab

## 2020-07-04 NOTE — ASSESSMENT & PLAN NOTE
· I cannot find any records revealing that this is a chronic thrombus as discussed with Cardiology possibly had in the past   Had a 2D echo he does have an LV thrombus yesterday recommendation for at least for the thrombus 3 months anticoagulation but he does have atrial fibrillation      · Increase Coumadin to 5 mg daily INR tomorrow continue lovenox  and Coumadin bridge till INR is 2

## 2020-07-04 NOTE — ASSESSMENT & PLAN NOTE
I suspect secondary to his increased anxiety as he is scared to eat but there is no with seeing of dysphagia he did okay with breakfast and he did okay with dinner without any issues and medications yesterday today he was complaining of after he is taking the med that he feeling this is taking sensation show discussed with nursing that will crush the meds     Also he does not have any teeth so he will continue modified diet puree  I did ask Psychiatry to evaluate the patient as well  Psychiatry evaluated yesterday I will not increase his nighttime Remeron secondary to some hypotension today  But I will add Xanax instead of p r n  Anxiety is p r n  30 minutes prior to food as he does feel anxious to eat  But he has been gaining weight while he is here continue pureed diet he is also does not have any dentures  Follow dysphagia diet  Follow aspiration precaution  GI, speech and swallow recommendation appreciated-follow video swallow evaluation  As per GI note, patient does not want EGD I discussed with patient again if he wants to have the EGD but he kind of went around the [place]  Will crushes pills  Will discuss with son will monitor for another day but there is no further workup warranted  S/P esophagogram on 11/19:Limited examination revealing no gross hypopharyngeal or esophageal  abnormalities  He had another 1 today did not show any physical abnormalities  See above management  Actually had a discussion with the patient regarding the PEG tube as his PCP recommended as well patient declined    Weight up to 128 lb

## 2020-07-05 PROBLEM — D69.3 CHRONIC IDIOPATHIC THROMBOCYTOPENIA (HCC): Status: ACTIVE | Noted: 2020-07-05

## 2020-07-05 LAB
BASOPHILS # BLD AUTO: 0.01 THOUSANDS/ΜL (ref 0–0.1)
BASOPHILS NFR BLD AUTO: 0 % (ref 0–1)
EOSINOPHIL # BLD AUTO: 0.05 THOUSAND/ΜL (ref 0–0.61)
EOSINOPHIL NFR BLD AUTO: 1 % (ref 0–6)
ERYTHROCYTE [DISTWIDTH] IN BLOOD BY AUTOMATED COUNT: 16 % (ref 11.6–15.1)
HCT VFR BLD AUTO: 29.3 % (ref 36.5–49.3)
HGB BLD-MCNC: 9.8 G/DL (ref 12–17)
IMM GRANULOCYTES # BLD AUTO: 0.01 THOUSAND/UL (ref 0–0.2)
IMM GRANULOCYTES NFR BLD AUTO: 0 % (ref 0–2)
INR PPP: 1.69 (ref 0.84–1.19)
LYMPHOCYTES # BLD AUTO: 1.08 THOUSANDS/ΜL (ref 0.6–4.47)
LYMPHOCYTES NFR BLD AUTO: 28 % (ref 14–44)
MCH RBC QN AUTO: 32.6 PG (ref 26.8–34.3)
MCHC RBC AUTO-ENTMCNC: 33.4 G/DL (ref 31.4–37.4)
MCV RBC AUTO: 97 FL (ref 82–98)
MONOCYTES # BLD AUTO: 0.66 THOUSAND/ΜL (ref 0.17–1.22)
MONOCYTES NFR BLD AUTO: 17 % (ref 4–12)
NEUTROPHILS # BLD AUTO: 2.07 THOUSANDS/ΜL (ref 1.85–7.62)
NEUTS SEG NFR BLD AUTO: 54 % (ref 43–75)
NRBC BLD AUTO-RTO: 0 /100 WBCS
PLATELET # BLD AUTO: 97 THOUSANDS/UL (ref 149–390)
PMV BLD AUTO: 9.3 FL (ref 8.9–12.7)
PROTHROMBIN TIME: 20 SECONDS (ref 11.6–14.5)
RBC # BLD AUTO: 3.01 MILLION/UL (ref 3.88–5.62)
WBC # BLD AUTO: 3.88 THOUSAND/UL (ref 4.31–10.16)

## 2020-07-05 PROCEDURE — 85610 PROTHROMBIN TIME: CPT | Performed by: FAMILY MEDICINE

## 2020-07-05 PROCEDURE — 85025 COMPLETE CBC W/AUTO DIFF WBC: CPT | Performed by: FAMILY MEDICINE

## 2020-07-05 PROCEDURE — 99232 SBSQ HOSP IP/OBS MODERATE 35: CPT | Performed by: FAMILY MEDICINE

## 2020-07-05 RX ADMIN — DOCUSATE SODIUM 100 MG: 100 CAPSULE, LIQUID FILLED ORAL at 18:22

## 2020-07-05 RX ADMIN — ENOXAPARIN SODIUM 60 MG: 60 INJECTION SUBCUTANEOUS at 20:43

## 2020-07-05 RX ADMIN — LEVOTHYROXINE SODIUM 50 MCG: 50 TABLET ORAL at 08:13

## 2020-07-05 RX ADMIN — SUCRALFATE 1 G: 1 TABLET ORAL at 18:22

## 2020-07-05 RX ADMIN — TAMSULOSIN HYDROCHLORIDE 0.4 MG: 0.4 CAPSULE ORAL at 18:26

## 2020-07-05 RX ADMIN — MIRTAZAPINE 15 MG: 15 TABLET, FILM COATED ORAL at 20:42

## 2020-07-05 RX ADMIN — WARFARIN SODIUM 6 MG: 3 TABLET ORAL at 18:22

## 2020-07-05 RX ADMIN — SERTRALINE HYDROCHLORIDE 25 MG: 25 TABLET ORAL at 08:13

## 2020-07-05 RX ADMIN — ROPINIROLE HYDROCHLORIDE 0.5 MG: 0.25 TABLET, FILM COATED ORAL at 20:43

## 2020-07-05 RX ADMIN — DOCUSATE SODIUM 100 MG: 100 CAPSULE, LIQUID FILLED ORAL at 08:13

## 2020-07-05 RX ADMIN — ENOXAPARIN SODIUM 60 MG: 60 INJECTION SUBCUTANEOUS at 08:13

## 2020-07-05 RX ADMIN — LISINOPRIL 5 MG: 5 TABLET ORAL at 08:13

## 2020-07-05 RX ADMIN — QUETIAPINE FUMARATE 50 MG: 25 TABLET ORAL at 20:42

## 2020-07-05 RX ADMIN — METOPROLOL TARTRATE 25 MG: 25 TABLET, FILM COATED ORAL at 08:13

## 2020-07-05 RX ADMIN — GABAPENTIN 100 MG: 100 CAPSULE ORAL at 20:48

## 2020-07-05 RX ADMIN — ATORVASTATIN CALCIUM 10 MG: 10 TABLET, FILM COATED ORAL at 08:13

## 2020-07-05 RX ADMIN — ASPIRIN 81 MG: 81 TABLET, COATED ORAL at 08:13

## 2020-07-05 RX ADMIN — PANTOPRAZOLE SODIUM 40 MG: 40 TABLET, DELAYED RELEASE ORAL at 08:13

## 2020-07-05 NOTE — ASSESSMENT & PLAN NOTE
· plt dropped from 115 on 6/29 to now 97, ok to proceed with lovenox and coumadine bridge- was on heparin drip was dced on 07/4    · Monitor closely- will check vitamin Y87 and folic acid

## 2020-07-05 NOTE — ASSESSMENT & PLAN NOTE
Malnutrition Findings:   Malnutrition type: Chronic illness  Degree of Malnutrition: Other severe protein calorie malnutrition(related to oropharyngeal dysphagia as evidenced by < 75% energy intake > 1 mo, severe muscle wasting noted at pectoralis, deltoid, interosseous muscles, moderate fat loss at orbitals and thoracic region, 16 1% wt loss x 8 mo (10/1/19 143#, 6/27/20 120#))  Weight gain of 8 lb since admisssion  BMI Findings:  BMI Classifications: Underweight < 18 5     Body mass index is 18 47 kg/m²

## 2020-07-05 NOTE — ASSESSMENT & PLAN NOTE
· I suspect secondary to his increased anxiety as he is scared to eat but there is no evidence of physical hindrance  of dysphagia  Also he does not have any teeth so he will continue modified diet puree  I did ask Psychiatry to evaluate the patient as well  Psychiatry evaluated patient  I will not increase his nighttime Remeron secondary to some hypotension before and to avoid oversedation   But I add Xanax instead of p r n  Anxiety is p r n  30 minutes prior to food as he does feel anxious to eat  But he has been gaining weight while he is here continue pureed diet he is also does not have any dentures  · GI, speech and swallow recommendation appreciated-follow video swallow evaluation  As per GI note, patient does not want EGD I discussed with patient again if he wants to have the EGD but he kind of went around the [place]  · S/P esophagogram on 11/19:Limited examination revealing no gross hypopharyngeal or esophageal  abnormalities  He had another 1 today did not show any physical abnormalities  · See above management  · Actually had a discussion with the patient regarding the PEG tube as his PCP recommended as well patient declined    · Weight up to 128 lb

## 2020-07-05 NOTE — ASSESSMENT & PLAN NOTE
· TSH is normal  · Continue nutritional supplement  · Continue diet  · Speech and swallow, nutritional consult appreciated  · Patient's poor p o  Intake with feeling of dysphagia suspect secondary to increased anxiety as no physical findings   Reviewed input done by Psychiatry  Will make some adjustments a but he did gain weight since he has been here upto 128 lb on admission was 120 lb   · I did have a detailed discussion with his primary care physician yesterday Dr Margot Rooney- in agreement that this is psychological   Patient is unable to return his assisted living as they are not able to accommodate his diet patient does need a 24 hour supervision and speech continuation working  He has been eating 50%-75% of the meals and his meds are being crushed without any issue although he at times does still complain certain things  Discussed with son in detail and they are in agreement that patient will go to [facility] on discharge    · Bun down   · Continue strict I's and O's and daily weights

## 2020-07-05 NOTE — ASSESSMENT & PLAN NOTE
· I cannot find any records revealing that this is a chronic thrombus as discussed with Cardiology possibly had in the past   Had a 2D echo he does have an LV thrombus yesterday recommendation for at least for the thrombus 3 months anticoagulation but he does have atrial fibrillation      · Increase Coumadin to 6(7/4) mg daily INR tomorrow continue lovenox  and Coumadin bridge till INR is 2

## 2020-07-05 NOTE — PROGRESS NOTES
Progress Note - Devon Mortimer 1940, 68 y o  male MRN: [id number]    Unit/Bed#: -[facility] Encounter: [id number]    Primary Care Provider: Pia Burciaga MD   Date and time admitted to hospital: 6/27/2020 12:53 PM        Chronic idiopathic thrombocytopenia ([facility] [address] )  Assessment & Plan  · plt dropped from 115 on 6/29 to now 97, ok to proceed with lovenox and coumadine bridge- was on heparin drip was dced on 07/4    · Monitor closely- will check vitamin A15 and folic acid    Localized swelling of right upper extremity  Assessment & Plan  · AC region yesterday when fluids were infusing fluids were discontinued IV was removed he had warm compresses suspected filtration today it is much better  Left ventricular apical thrombus  Assessment & Plan  · I cannot find any records revealing that this is a chronic thrombus as discussed with Cardiology possibly had in the past   Had a 2D echo he does have an LV thrombus yesterday recommendation for at least for the thrombus 3 months anticoagulation but he does have atrial fibrillation  · Increase Coumadin to 6(7/4) mg daily INR tomorrow continue lovenox  and Coumadin bridge till INR is 2    Severe protein-calorie malnutrition (HCC)  Assessment & Plan  Malnutrition Findings:   Malnutrition type: Chronic illness  Degree of Malnutrition: Other severe protein calorie malnutrition(related to oropharyngeal dysphagia as evidenced by < 75% energy intake > 1 mo, severe muscle wasting noted at pectoralis, deltoid, interosseous muscles, moderate fat loss at orbitals and thoracic region, 16 1% wt loss x 8 mo (10/1/19 143#, 6/27/20 120#))  Weight gain of 8 lb since admisssion  BMI Findings:  BMI Classifications: Underweight < 18 5     Body mass index is 18 47 kg/m²         Mixed obsessional thoughts and acts  Assessment & Plan  Severe nature  Continue current treatment and management    Physical deconditioning  Assessment & Plan  - will be discharged to nursing home anticipate early in the week    Chronic combined systolic and diastolic congestive heart failure ([facility] )  Assessment & Plan  · 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   As reviewed per records  · He is compensated  He is actually failure to thrive no need for any diuretics  He is EF is 35-40% with grade 3 diastolic dysfunction on the echocardiogram    · Initially lisinopril was lowered to 5 mg daily secondary to low blood pressure in a blood pressure stable continue metoprolol as well  Bipolar 1 disorder, depressed ([place] [address] )  Assessment & Plan  Continue home medication      Type 2 diabetes mellitus with diabetic neuropathy, without long-term current use of insulin ([place] [address] )  Assessment & Plan  No results found for: HGBA1C    Recent Labs     07/02/20  1528   POCGLU 96       Blood Sugar Average: Last 72 hrs:  (P) 96 sliding scale  Continue gabapentin  Controlled    Urinary retention  Assessment & Plan  History of urinary retention  Follow urinary retention protocol  No issues    Anxiety  Assessment & Plan  Continue home medication  Uncontrolled that is causing him not T eat much as he is scared to eat  Consulted Psychiatry to see if any medication needs to be adjusted  Reviewed recommendation all actually place Xanax 30 minutes prior to each meal to see if that helps    Permanent atrial fibrillation [facility])  Assessment & Plan    Secondary to LV thrombus he is currently on Lovenox to Coumadin bridge INR should be between 2-3 discussed with Cardiology as well  INR still subtherapeutic will increase Coumadin to 6 mg(7/4) daily started 7/2 with heparin bridge  INR 2-3  Continue metoprolol  Will discontinue heparin done on 07/4 and switch him to Lovenox bridge as he is hard stick all in bruises from multiple blood draws      Generalized weakness  Assessment & Plan  Secondary to multiple factors  Continue nutritional supplement  Eating weight gain to 128 lb   Follow PT OT, speech evaluation- final disposition to nursing home      Failure to thrive in adult  Assessment & Plan  · TSH is normal  · Continue nutritional supplement  · Continue diet  · Speech and swallow, nutritional consult appreciated  · Patient's poor p o  Intake with feeling of dysphagia suspect secondary to increased anxiety as no physical findings   Reviewed input done by Psychiatry  Will make some adjustments a but he did gain weight since he has been here upto 128 lb on admission was 120 lb   · I did have a detailed discussion with his primary care physician yesterday Dr Saud Barry- in agreement that this is psychological   Patient is unable to return his assisted living as they are not able to accommodate his diet patient does need a 24 hour supervision and speech continuation working  He has been eating 50%-75% of the meals and his meds are being crushed without any issue although he at times does still complain certain things  Discussed with son in detail and they are in agreement that patient will go to [facility] on discharge  · Bun down   · Continue strict I's and O's and daily weights    * Oropharyngeal dysphagia  Assessment & Plan  · I suspect secondary to his increased anxiety as he is scared to eat but there is no evidence of physical hindrance  of dysphagia  Also he does not have any teeth so he will continue modified diet puree  I did ask Psychiatry to evaluate the patient as well  Psychiatry evaluated patient  I will not increase his nighttime Remeron secondary to some hypotension before and to avoid oversedation   But I add Xanax instead of p r n  Anxiety is p r n  30 minutes prior to food as he does feel anxious to eat  But he has been gaining weight while he is here continue pureed diet he is also does not have any dentures  · GI, speech and swallow recommendation appreciated-follow video swallow evaluation  As per GI note, patient does not want EGD I discussed with patient again if he wants to have the EGD but he kind of went around the [place]      · S/P esophagogram on :Limited examination revealing no gross hypopharyngeal or esophageal  abnormalities  He had another 1 today did not show any physical abnormalities  · See above management  · Actually had a discussion with the patient regarding the PEG tube as his PCP recommended as well patient declined  · Weight up to 128 lb      VTE Pharmacologic Prophylaxis:   Pharmacologic: Enoxaparin (Lovenox)/warfarin  Mechanical VTE Prophylaxis in Place: Yes    Patient Centered Rounds: I have performed bedside rounds with nursing staff today  Discussions with Specialists or Other Care Team Provider: discussed with nursing     Education and Discussions with Family / Patient: patient     Time Spent for Care: 30 minutes  More than 50% of total time spent on counseling and coordination of care as described above  Current Length of Stay: 8 day(s)    Current Patient Status: Inpatient   Certification Statement: The patient will continue to require additional inpatient hospital stay due to Subtherapeutic INR    Discharge Plan:  On dissipate discharge in 24-48 hours if placement is found    Code Status: Level 3 - DNAR and DNI      Subjective:   Patient seen and examined complaining that he started wants to sleep no chest pain or shortness of breath    Objective:     Vitals:   Temp (24hrs), Av 8 °F (36 6 °C), Min:97 7 °F (36 5 °C), Max:97 8 °F (36 6 °C)    Temp:  [97 7 °F (36 5 °C)-97 8 °F (36 6 °C)] 97 8 °F (36 6 °C)  HR:  [58-59] 58  Resp:  [16-17] 17  BP: (121-122)/(62-63) 121/63  SpO2:  [98 %-100 %] 100 %  Body mass index is 18 47 kg/m²  Input and Output Summary (last 24 hours): Intake/Output Summary (Last 24 hours) at 2020 1217  Last data filed at 2020 0901  Gross per 24 hour   Intake 480 ml   Output 400 ml   Net 80 ml       Physical Exam:     Physical Exam   Constitutional: He is oriented to person, place, and time  Cachectic   HENT:   Head: Normocephalic and atraumatic     Eyes: Pupils are equal, round, and reactive to light  EOM are normal    Neck: Normal range of motion  Cardiovascular: Normal rate, regular rhythm and normal heart sounds  Pulmonary/Chest: Effort normal and breath sounds normal  No stridor  No respiratory distress  He has no wheezes  Abdominal: Soft  Bowel sounds are normal    Musculoskeletal: Normal range of motion  He exhibits no edema  Neurological: He is alert and oriented to person, place, and time  He has normal reflexes  cranial nerve 2-12 are normal   Normal neurological exam   Skin: Skin is warm  Psychiatric: He has a normal mood and affect  Additional Data:     Labs:    Results from last 7 days   Lab Units 07/05/20  0534   WBC Thousand/uL 3 88*   HEMOGLOBIN g/dL 9 8*   HEMATOCRIT % 29 3*   PLATELETS Thousands/uL 97*   NEUTROS PCT % 54   LYMPHS PCT % 28   MONOS PCT % 17*   EOS PCT % 1     Results from last 7 days   Lab Units 07/04/20  0556  06/29/20  1002   SODIUM mmol/L 144   < > 144   POTASSIUM mmol/L 3 8   < > 3 9   CHLORIDE mmol/L 109*   < > 109*   CO2 mmol/L 30   < > 29   BUN mg/dL 30*   < > 17   CREATININE mg/dL 0 96   < > 1 05   ANION GAP mmol/L 5   < > 6   CALCIUM mg/dL 8 7   < > 9 1   ALBUMIN g/dL  --   --  3 5   TOTAL BILIRUBIN mg/dL  --   --  0 46   ALK PHOS U/L  --   --  82   ALT U/L  --   --  13   AST U/L  --   --  18   GLUCOSE RANDOM mg/dL 95   < > 108    < > = values in this interval not displayed  Results from last 7 days   Lab Units 07/05/20  0534   INR  1 69*     Results from last 7 days   Lab Units 07/02/20  1528 07/01/20  2043 07/01/20  1100 07/01/20  0718 06/30/20  2105 06/30/20  1631 06/30/20  1139 06/30/20  0734 06/29/20  2153 06/29/20  1619 06/29/20  1047 06/29/20  0729   POC GLUCOSE mg/dl 96 102 118 92 100 103 119 83 84 85 100 97                   * I Have Reviewed All Lab Data Listed Above  * Additional Pertinent Lab Tests Reviewed:  All Labs Within Last 24 Hours Reviewed    Imaging:    Imaging Reports Reviewed Today Include: none today   Imaging Personally Reviewed by Myself Includes:      Recent Cultures (last 7 days):           Last 24 Hours Medication List:     Current Facility-Administered Medications:  acetaminophen 650 mg Oral Q6H PRN Pancho Estrada, MD   ALPRAZolam 0 25 mg Oral TID PRN Ethan Rodríguez MD   aluminum-magnesium hydroxide-simethicone 30 mL Oral Q6H PRN Pancho Estrada, MD   aspirin 81 mg Oral Daily [facility] Sean, MD   atorvastatin 10 mg Oral Daily [facility] Sean, MD   barium sulfate 1 tablet Oral Once in imaging Yoselin Santana MD   docusate sodium 100 mg Oral BID [facility] Sean, MD   enoxaparin 1 mg/kg Subcutaneous Q12H [address] Ethan Rodríguez MD   ergocalciferol 50,000 Units Oral Weekly [facility] Sean, MD   gabapentin 100 mg Oral HS [facility] Sean, MD   levothyroxine 50 mcg Oral Daily [facility] Sean, MD   lisinopril 5 mg Oral Daily Ethan Rodríguez MD   metoprolol tartrate 25 mg Oral QAM Ethan Rodríguez MD   mirtazapine 15 mg Oral HS Genevieve Mckinnon MD   nitroglycerin 0 4 mg Sublingual Q5 Min PRN Genevieve Mckinnon MD   ondansetron 4 mg Intravenous Q6H PRN Rashidal Sean, MD   pantoprazole 40 mg Oral Daily [facility] Sean, MD   potassium chloride 20 mEq Oral Daily [facility] Sean, MD   QUEtiapine 50 mg Oral HS [facility] Sean, MD   rOPINIRole 0 5 mg Oral HS [facility] Sean, MD   sertraline 25 mg Oral QAM [facility] MD Sean   sodium chloride (PF) 3 mL Intravenous Q1H PRN Lizbet Kearney MD   sucralfate 1 g Oral BID [facility] Sean, MD   tamsulosin 0 4 mg Oral Daily With Rafa Paris MD   warfarin 6 mg Oral Daily (warfarin) Ethan Rodríguez MD        Today, Patient Was Seen By: Ethan Rodríguez MD    ** Please Note: Dictation voice to text software may have been used in the creation of this document   **

## 2020-07-05 NOTE — PLAN OF CARE
Problem: Potential for Falls  Goal: Patient will remain free of falls  Description  INTERVENTIONS:  - Assess patient frequently for physical needs  -  Identify cognitive and physical deficits and behaviors that affect risk of falls  -  [place] fall precautions as indicated by assessment   - Educate patient/family on patient safety including physical limitations  - Instruct patient to call for assistance with activity based on assessment  - Modify environment to reduce risk of injury  - Consider OT/PT consult to assist with strengthening/mobility  Outcome: Progressing     Problem: Prexisting or High Potential for Compromised Skin Integrity  Goal: Skin integrity is maintained or improved  Description  INTERVENTIONS:  - Identify patients at risk for skin breakdown  - Assess and monitor skin integrity  - Assess and monitor nutrition and hydration status  - Monitor labs   - Assess for incontinence   - Turn and reposition patient  - Assist with mobility/ambulation  - Relieve pressure over bony prominences  - Avoid friction and shearing  - Provide appropriate hygiene as needed including keeping skin clean and dry  - Evaluate need for skin moisturizer/barrier cream  - Collaborate with interdisciplinary team   - Patient/family teaching  - Consider wound care consult   Outcome: Progressing     Problem: Nutrition/Hydration-ADULT  Goal: Nutrient/Hydration intake appropriate for improving, restoring or maintaining nutritional needs  Description  Monitor and assess patient's nutrition/hydration status for malnutrition  Collaborate with interdisciplinary team and initiate plan and interventions as ordered  Monitor patient's weight and dietary intake as ordered or per policy  Utilize nutrition screening tool and intervene as necessary  Determine patient's food preferences and provide high-protein, high-caloric foods as appropriate       INTERVENTIONS:  - Monitor oral intake, urinary output, labs, and treatment plans  - Assess nutrition and hydration status and recommend course of action  - Evaluate amount of meals eaten  - Assist patient with eating if necessary   - Allow adequate time for meals  - Recommend/ encourage appropriate diets, oral nutritional supplements, and vitamin/mineral supplements  - Order, calculate, and assess calorie counts as needed  - Recommend, monitor, and adjust tube feedings and TPN/PPN based on assessed needs  - Assess need for intravenous fluids  - Provide specific nutrition/hydration education as appropriate  - Include patient/family/caregiver in decisions related to nutrition  Outcome: Progressing

## 2020-07-05 NOTE — PLAN OF CARE
Problem: Potential for Falls  Goal: Patient will remain free of falls  Description  INTERVENTIONS:  - Assess patient frequently for physical needs  -  Identify cognitive and physical deficits and behaviors that affect risk of falls  -  [place] fall precautions as indicated by assessment   - Educate patient/family on patient safety including physical limitations  - Instruct patient to call for assistance with activity based on assessment  - Modify environment to reduce risk of injury  - Consider OT/PT consult to assist with strengthening/mobility  Outcome: Progressing     Problem: Prexisting or High Potential for Compromised Skin Integrity  Goal: Skin integrity is maintained or improved  Description  INTERVENTIONS:  - Identify patients at risk for skin breakdown  - Assess and monitor skin integrity  - Assess and monitor nutrition and hydration status  - Monitor labs   - Assess for incontinence   - Turn and reposition patient  - Assist with mobility/ambulation  - Relieve pressure over bony prominences  - Avoid friction and shearing  - Provide appropriate hygiene as needed including keeping skin clean and dry  - Evaluate need for skin moisturizer/barrier cream  - Collaborate with interdisciplinary team   - Patient/family teaching  - Consider wound care consult   Outcome: Progressing     Problem: Nutrition/Hydration-ADULT  Goal: Nutrient/Hydration intake appropriate for improving, restoring or maintaining nutritional needs  Description  Monitor and assess patient's nutrition/hydration status for malnutrition  Collaborate with interdisciplinary team and initiate plan and interventions as ordered  Monitor patient's weight and dietary intake as ordered or per policy  Utilize nutrition screening tool and intervene as necessary  Determine patient's food preferences and provide high-protein, high-caloric foods as appropriate       INTERVENTIONS:  - Monitor oral intake, urinary output, labs, and treatment plans  - Assess nutrition and hydration status and recommend course of action  - Evaluate amount of meals eaten  - Assist patient with eating if necessary   - Allow adequate time for meals  - Recommend/ encourage appropriate diets, oral nutritional supplements, and vitamin/mineral supplements  - Order, calculate, and assess calorie counts as needed  - Recommend, monitor, and adjust tube feedings and TPN/PPN based on assessed needs  - Assess need for intravenous fluids  - Provide specific nutrition/hydration education as appropriate  - Include patient/family/caregiver in decisions related to nutrition  Outcome: Progressing

## 2020-07-05 NOTE — ASSESSMENT & PLAN NOTE
No results found for: HGBA1C    Recent Labs     07/02/20  1528   POCGLU 96       Blood Sugar Average: Last 72 hrs:  (P) 96 sliding scale  Continue gabapentin  Controlled

## 2020-07-05 NOTE — ASSESSMENT & PLAN NOTE
Secondary to LV thrombus he is currently on Lovenox to Coumadin bridge INR should be between 2-3 discussed with Cardiology as well  INR still subtherapeutic will increase Coumadin to 6 mg(7/4) daily started 7/2 with heparin bridge  INR 2-3  Continue metoprolol  Will discontinue heparin done on 07/4 and switch him to Lovenox bridge as he is hard stick all in bruises from multiple blood draws

## 2020-07-06 PROBLEM — E53.8 VITAMIN B12 DEFICIENCY: Status: ACTIVE | Noted: 2020-07-06

## 2020-07-06 LAB
BASOPHILS # BLD AUTO: 0.01 THOUSANDS/ΜL (ref 0–0.1)
BASOPHILS NFR BLD AUTO: 0 % (ref 0–1)
EOSINOPHIL # BLD AUTO: 0.04 THOUSAND/ΜL (ref 0–0.61)
EOSINOPHIL NFR BLD AUTO: 1 % (ref 0–6)
ERYTHROCYTE [DISTWIDTH] IN BLOOD BY AUTOMATED COUNT: 15.9 % (ref 11.6–15.1)
FOLATE SERPL-MCNC: 8 NG/ML (ref 3.1–17.5)
HCT VFR BLD AUTO: 29.5 % (ref 36.5–49.3)
HGB BLD-MCNC: 9.9 G/DL (ref 12–17)
IMM GRANULOCYTES # BLD AUTO: 0.01 THOUSAND/UL (ref 0–0.2)
IMM GRANULOCYTES NFR BLD AUTO: 0 % (ref 0–2)
INR PPP: 1.64 (ref 0.84–1.19)
LYMPHOCYTES # BLD AUTO: 1.18 THOUSANDS/ΜL (ref 0.6–4.47)
LYMPHOCYTES NFR BLD AUTO: 33 % (ref 14–44)
MCH RBC QN AUTO: 32.5 PG (ref 26.8–34.3)
MCHC RBC AUTO-ENTMCNC: 33.6 G/DL (ref 31.4–37.4)
MCV RBC AUTO: 97 FL (ref 82–98)
MONOCYTES # BLD AUTO: 0.59 THOUSAND/ΜL (ref 0.17–1.22)
MONOCYTES NFR BLD AUTO: 17 % (ref 4–12)
NEUTROPHILS # BLD AUTO: 1.75 THOUSANDS/ΜL (ref 1.85–7.62)
NEUTS SEG NFR BLD AUTO: 49 % (ref 43–75)
NRBC BLD AUTO-RTO: 0 /100 WBCS
PLATELET # BLD AUTO: 98 THOUSANDS/UL (ref 149–390)
PMV BLD AUTO: 9.4 FL (ref 8.9–12.7)
PROTHROMBIN TIME: 19.5 SECONDS (ref 11.6–14.5)
RBC # BLD AUTO: 3.05 MILLION/UL (ref 3.88–5.62)
VIT B12 SERPL-MCNC: 371 PG/ML (ref 100–900)
WBC # BLD AUTO: 3.58 THOUSAND/UL (ref 4.31–10.16)

## 2020-07-06 PROCEDURE — 82607 VITAMIN B-12: CPT | Performed by: FAMILY MEDICINE

## 2020-07-06 PROCEDURE — 82746 ASSAY OF FOLIC ACID SERUM: CPT | Performed by: FAMILY MEDICINE

## 2020-07-06 PROCEDURE — 85025 COMPLETE CBC W/AUTO DIFF WBC: CPT | Performed by: FAMILY MEDICINE

## 2020-07-06 PROCEDURE — 99232 SBSQ HOSP IP/OBS MODERATE 35: CPT | Performed by: FAMILY MEDICINE

## 2020-07-06 PROCEDURE — 85610 PROTHROMBIN TIME: CPT | Performed by: FAMILY MEDICINE

## 2020-07-06 RX ORDER — WARFARIN SODIUM 7.5 MG/1
7.5 TABLET ORAL
Status: COMPLETED | OUTPATIENT
Start: 2020-07-06 | End: 2020-07-06

## 2020-07-06 RX ADMIN — DOCUSATE SODIUM 100 MG: 100 CAPSULE, LIQUID FILLED ORAL at 08:14

## 2020-07-06 RX ADMIN — WARFARIN SODIUM 7.5 MG: 7.5 TABLET ORAL at 17:04

## 2020-07-06 RX ADMIN — PANTOPRAZOLE SODIUM 40 MG: 40 TABLET, DELAYED RELEASE ORAL at 08:14

## 2020-07-06 RX ADMIN — LEVOTHYROXINE SODIUM 50 MCG: 50 TABLET ORAL at 08:14

## 2020-07-06 RX ADMIN — SUCRALFATE 1 G: 1 TABLET ORAL at 08:14

## 2020-07-06 RX ADMIN — ASPIRIN 81 MG: 81 TABLET, COATED ORAL at 08:14

## 2020-07-06 RX ADMIN — ENOXAPARIN SODIUM 60 MG: 60 INJECTION SUBCUTANEOUS at 08:14

## 2020-07-06 RX ADMIN — ENOXAPARIN SODIUM 60 MG: 60 INJECTION SUBCUTANEOUS at 21:46

## 2020-07-06 RX ADMIN — DOCUSATE SODIUM 100 MG: 100 CAPSULE, LIQUID FILLED ORAL at 17:04

## 2020-07-06 RX ADMIN — TAMSULOSIN HYDROCHLORIDE 0.4 MG: 0.4 CAPSULE ORAL at 17:04

## 2020-07-06 RX ADMIN — QUETIAPINE FUMARATE 50 MG: 25 TABLET ORAL at 21:46

## 2020-07-06 RX ADMIN — LISINOPRIL 5 MG: 5 TABLET ORAL at 08:17

## 2020-07-06 RX ADMIN — ALPRAZOLAM 0.25 MG: 0.25 TABLET ORAL at 21:46

## 2020-07-06 RX ADMIN — GABAPENTIN 100 MG: 100 CAPSULE ORAL at 21:46

## 2020-07-06 RX ADMIN — SUCRALFATE 1 G: 1 TABLET ORAL at 17:04

## 2020-07-06 RX ADMIN — SERTRALINE HYDROCHLORIDE 25 MG: 25 TABLET ORAL at 08:14

## 2020-07-06 RX ADMIN — ALPRAZOLAM 0.25 MG: 0.25 TABLET ORAL at 08:29

## 2020-07-06 RX ADMIN — METOPROLOL TARTRATE 25 MG: 25 TABLET, FILM COATED ORAL at 08:17

## 2020-07-06 RX ADMIN — CYANOCOBALAMIN TAB 500 MCG 1000 MCG: 500 TAB at 12:09

## 2020-07-06 RX ADMIN — MIRTAZAPINE 15 MG: 15 TABLET, FILM COATED ORAL at 21:46

## 2020-07-06 RX ADMIN — ATORVASTATIN CALCIUM 10 MG: 10 TABLET, FILM COATED ORAL at 08:17

## 2020-07-06 RX ADMIN — ROPINIROLE HYDROCHLORIDE 0.5 MG: 0.25 TABLET, FILM COATED ORAL at 21:46

## 2020-07-06 RX ADMIN — POTASSIUM CHLORIDE 20 MEQ: 20 SOLUTION ORAL at 08:14

## 2020-07-06 NOTE — PROGRESS NOTES
Progress Note - Nabil Uriostegui 1940, 68 y o  male MRN: [id number]    Unit/Bed#: -[facility] Encounter: [id number]    Primary Care Provider: Marina Hook MD   Date and time admitted to hospital: 6/27/2020 12:53 PM        Vitamin B12 deficiency  Assessment & Plan  · Level is less than 400 at his age above  Will start vitamin B12 1000 my g p o  Daily    Chronic idiopathic thrombocytopenia (HCC)  Assessment & Plan  · plt dropped from 115 on 6/29 to now 97, ok to proceed with lovenox and coumadine bridge- was on heparin drip was dced on 07/4    · Monitor closely- will check vitamin M92 and folic acid- platelet went up to 30,495  Vitamin B12 is deficient according to his HGB above 400 will start p o  Treatment  Localized swelling of right upper extremity  Assessment & Plan  · Resolved- just some bruising AC region yesterday when fluids were infusing fluids were discontinued IV was removed he had warm compresses suspected filtration today it is much better  Left ventricular apical thrombus  Assessment & Plan  · I cannot find any records revealing that this is a chronic thrombus as discussed with Cardiology possibly had in the past   Had a 2D echo he does have an LV thrombus yesterday recommendation for at least for the thrombus 3 months anticoagulation but he does have atrial fibrillation      · Increase Coumadin to 7 5 mg po x1(7/5) mg daily INR tomorrow continue lovenox  and Coumadin bridge till INR is 2    Severe protein-calorie malnutrition (HCC)  Assessment & Plan  Malnutrition Findings:   Malnutrition type: Chronic illness  Degree of Malnutrition: Other severe protein calorie malnutrition(related to oropharyngeal dysphagia as evidenced by < 75% energy intake > 1 mo, severe muscle wasting noted at pectoralis, deltoid, interosseous muscles, moderate fat loss at orbitals and thoracic region, 16 1% wt loss x 8 mo (10/1/19 143#, 6/27/20 120#))  Weight gain of 8 lb since admisssion  BMI Findings:  BMI Classifications: Underweight < 18 5     Body mass index is 18 41 kg/m²  Mixed obsessional thoughts and acts  Assessment & Plan  Severe nature  Continue current treatment and management    Physical deconditioning  Assessment & Plan  - will be discharged to nursing home anticipate early in the week    Chronic combined systolic and diastolic congestive heart failure ([facility] )  Assessment & Plan  · 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   As reviewed per records  · He is compensated  He is actually failure to thrive no need for any diuretics  He is EF is 35-40% with grade 3 diastolic dysfunction on the echocardiogram    · Initially lisinopril was lowered to 5 mg daily secondary to low blood pressure in a blood pressure stable continue metoprolol as well  Bipolar 1 disorder, depressed ([place] [id number] )  Assessment & Plan  Continue home medication      Type 2 diabetes mellitus with diabetic neuropathy, without long-term current use of insulin ([place] [id number] )  Assessment & Plan  No results found for: HGBA1C    No results for input(s): POCGLU in the last 72 hours  Blood Sugar Average: Last 72 hrs:   sliding scale  Continue gabapentin  Controlled    Urinary retention  Assessment & Plan  History of urinary retention  Follow urinary retention protocol  No issues    Anxiety  Assessment & Plan  Continue home medication  Uncontrolled that is causing him not T eat much as he is scared to eat  Consulted Psychiatry to see if any medication needs to be adjusted  Reviewed recommendation all actually place Xanax 30 minutes prior to each meal to see if that helps    Permanent atrial fibrillation [facility])  Assessment & Plan    Secondary to LV thrombus he is currently on Lovenox to Coumadin bridge INR should be between 2-3 discussed with Cardiology as well  INR still subtherapeutic will increase Coumadin to 7 5 mg po x1(7/6) daily started 7/2 with heparin bridge  INR 2-3    Continue metoprolol  Will discontinue heparin done on 07/4 and switch him to Lovenox bridge as he is hard stick all in bruises from multiple blood draws  Generalized weakness  Assessment & Plan  Secondary to multiple factors  Continue nutritional supplement  Eating weight gain to 128 lb   Follow PT OT, speech evaluation- final disposition to nursing home      Failure to thrive in adult  Assessment & Plan  · TSH is normal  · Continue nutritional supplement  · Continue diet  · Speech and swallow, nutritional consult appreciated  · Patient's poor p o  Intake with feeling of dysphagia suspect secondary to increased anxiety as no physical findings   Reviewed input done by Psychiatry  Will make some adjustments a but he did gain weight since he has been here upto 128 lb on admission was 120 lb   · I did have a detailed discussion with his primary care physician yesterday Dr Annette Chery- in agreement that this is psychological   Patient is unable to return his assisted living as they are not able to accommodate his diet patient does need a 24 hour supervision and speech continuation working  He has been eating 50%-75% of the meals and his meds are being crushed without any issue although he at times does still complain certain things  Discussed with son in detail and they are in agreement that patient will go to NH on discharge  · Bun down   · Continue strict I's and O's and daily weights    * Oropharyngeal dysphagia  Assessment & Plan  · I suspect secondary to his increased anxiety as he is scared to eat but there is no evidence of physical hindrance  of dysphagia  Also he does not have any teeth so he will continue modified diet puree  I did ask Psychiatry to evaluate the patient as well  Psychiatry evaluated patient  I will not increase his nighttime Remeron secondary to some hypotension before and to avoid oversedation   But I add Xanax instead of p r n  Anxiety is p r n  30 minutes prior to food as he does feel anxious to eat    But he has been gaining weight while he is here continue pureed diet he is also does not have any dentures  · GI, speech and swallow recommendation appreciated-follow video swallow evaluation  As per GI note, patient does not want EGD I discussed with patient again if he wants to have the EGD but he kind of went around the [place]  · S/P esophagogram on :Limited examination revealing no gross hypopharyngeal or esophageal  abnormalities  He had another 1 today did not show any physical abnormalities  · See above management  · Actually had a discussion with the patient regarding the PEG tube as his PCP recommended as well patient declined  · Weight up to 128 lb        VTE Pharmacologic Prophylaxis:   Pharmacologic: Enoxaparin (Lovenox)/Coumadin bridge  Mechanical VTE Prophylaxis in Place: Yes    Patient Centered Rounds: I have performed bedside rounds with nursing staff today  Discussions with Specialists or Other Care Team Provider:  Discussed with nursing and cm    Education and Discussions with Family / Patient:  Patient    Time Spent for Care: 30 minutes  More than 50% of total time spent on counseling and coordination of care as described above  Current Length of Stay: 9 day(s)    Current Patient Status: Inpatient   Certification Statement: The patient will continue to require additional inpatient hospital stay due to Looking for placement    Discharge Plan:  Patient is clear to be discharged looking for placement    Code Status: Level 3 - DNAR and DNI      Subjective:   Patient seen and examined he is eating but he is always anxious he wants to stay in the hospital forever does not want to go to the nursing home  Objective:     Vitals:   Temp (24hrs), Av 3 °F (36 8 °C), Min:97 8 °F (36 6 °C), Max:98 7 °F (37 1 °C)    Temp:  [97 8 °F (36 6 °C)-98 7 °F (37 1 °C)] 97 8 °F (36 6 °C)  HR:  [55-72] 72  Resp:  [16-18] 18  BP: (107-123)/(50-68) 115/68  SpO2:  [97 %-100 %] 100 %  Body mass index is 18 41 kg/m²       Input and Output Summary (last 24 hours): Intake/Output Summary (Last 24 hours) at 7/6/2020 1115  Last data filed at 7/6/2020 0838  Gross per 24 hour   Intake 240 ml   Output    Net 240 ml       Physical Exam:     Physical Exam   Constitutional: He is oriented to person, place, and time  Malnourished   HENT:   Head: Normocephalic and atraumatic  Eyes: Pupils are equal, round, and reactive to light  EOM are normal    Neck: Normal range of motion  Cardiovascular: Normal rate, regular rhythm and normal heart sounds  Pulmonary/Chest: Effort normal and breath sounds normal  No stridor  No respiratory distress  He has no wheezes  Abdominal: Soft  Bowel sounds are normal    Musculoskeletal: Normal range of motion  He exhibits no edema  Neurological: He is alert and oriented to person, place, and time  He has normal reflexes  cranial nerve 2-12 are normal   Normal neurological exam   Skin: Skin is warm  Psychiatric: He has a normal mood and affect  Additional Data:     Labs:    Results from last 7 days   Lab Units 07/06/20  0552   WBC Thousand/uL 3 58*   HEMOGLOBIN g/dL 9 9*   HEMATOCRIT % 29 5*   PLATELETS Thousands/uL 98*   NEUTROS PCT % 49   LYMPHS PCT % 33   MONOS PCT % 17*   EOS PCT % 1     Results from last 7 days   Lab Units 07/04/20  0556   SODIUM mmol/L 144   POTASSIUM mmol/L 3 8   CHLORIDE mmol/L 109*   CO2 mmol/L 30   BUN mg/dL 30*   CREATININE mg/dL 0 96   ANION GAP mmol/L 5   CALCIUM mg/dL 8 7   GLUCOSE RANDOM mg/dL 95     Results from last 7 days   Lab Units 07/06/20  0552   INR  1 64*     Results from last 7 days   Lab Units 07/02/20  1528 07/01/20  2043 07/01/20  1100 07/01/20  0718 06/30/20  2105 06/30/20  1631 06/30/20  1139 06/30/20  0734 06/29/20  2153 06/29/20  1619   POC GLUCOSE mg/dl 96 102 118 92 100 103 119 83 84 85                   * I Have Reviewed All Lab Data Listed Above  * Additional Pertinent Lab Tests Reviewed:  All Labs Within Last 24 Hours Reviewed    Imaging:    Imaging Reports Reviewed Today Include: none today   Imaging Personally Reviewed by Myself Includes:      Recent Cultures (last 7 days):           Last 24 Hours Medication List:     Current Facility-Administered Medications:  acetaminophen 650 mg Oral Q6H PRN Steph Perea MD   ALPRAZolam 0 25 mg Oral TID PRN Lidia Rain MD   aluminum-magnesium hydroxide-simethicone 30 mL Oral Q6H PRN Steph Perea MD   aspirin 81 mg Oral Daily Steph Perea MD   atorvastatin 10 mg Oral Daily Steph Perea MD   barium sulfate 1 tablet Oral Once in imaging Mukesh Shoemaker MD   cyanocobalamin 1,000 mcg Oral Daily Lidia Rain MD   docusate sodium 100 mg Oral BID Steph Perea MD   enoxaparin 1 mg/kg Subcutaneous Q12H [address] Lidia Rain MD   ergocalciferol 50,000 Units Oral Weekly Steph Perea MD   gabapentin 100 mg Oral HS Steph Perea MD   levothyroxine 50 mcg Oral Daily Steph Perea MD   lisinopril 5 mg Oral Daily Lidia Rain MD   metoprolol tartrate 25 mg Oral QAM Lidia Rain MD   mirtazapine 15 mg Oral HS Genevieve Mckinnon MD   nitroglycerin 0 4 mg Sublingual Q5 Min PRN Genevieve Mckinnon MD   ondansetron 4 mg Intravenous Q6H PRN Steph Perea MD   pantoprazole 40 mg Oral Daily Steph Perea MD   potassium chloride 20 mEq Oral Daily Steph Perea MD   QUEtiapine 50 mg Oral HS Steph Perea MD   rOPINIRole 0 5 mg Oral HS Steph Perea MD   sertraline 25 mg Oral QAM Steph Perea MD   sodium chloride (PF) 3 mL Intravenous Q1H PRN Matti Albrecht MD   sucralfate 1 g Oral BID Steph Perea MD   tamsulosin 0 4 mg Oral Daily With Ronak Barrios MD   warfarin 7 5 mg Oral Once (warfarin) Lidia Rain MD        Today, Patient Was Seen By: Lidia Rain MD    ** Please Note: Dictation voice to text software may have been used in the creation of this document   **

## 2020-07-06 NOTE — PLAN OF CARE
Problem: Potential for Falls  Goal: Patient will remain free of falls  Description  INTERVENTIONS:  - Assess patient frequently for physical needs  -  Identify cognitive and physical deficits and behaviors that affect risk of falls  -  [place] fall precautions as indicated by assessment   - Educate patient/family on patient safety including physical limitations  - Instruct patient to call for assistance with activity based on assessment  - Modify environment to reduce risk of injury  - Consider OT/PT consult to assist with strengthening/mobility  Outcome: Progressing     Problem: Prexisting or High Potential for Compromised Skin Integrity  Goal: Skin integrity is maintained or improved  Description  INTERVENTIONS:  - Identify patients at risk for skin breakdown  - Assess and monitor skin integrity  - Assess and monitor nutrition and hydration status  - Monitor labs   - Assess for incontinence   - Turn and reposition patient  - Assist with mobility/ambulation  - Relieve pressure over bony prominences  - Avoid friction and shearing  - Provide appropriate hygiene as needed including keeping skin clean and dry  - Evaluate need for skin moisturizer/barrier cream  - Collaborate with interdisciplinary team   - Patient/family teaching  - Consider wound care consult   Outcome: Progressing     Problem: Nutrition/Hydration-ADULT  Goal: Nutrient/Hydration intake appropriate for improving, restoring or maintaining nutritional needs  Description  Monitor and assess patient's nutrition/hydration status for malnutrition  Collaborate with interdisciplinary team and initiate plan and interventions as ordered  Monitor patient's weight and dietary intake as ordered or per policy  Utilize nutrition screening tool and intervene as necessary  Determine patient's food preferences and provide high-protein, high-caloric foods as appropriate       INTERVENTIONS:  - Monitor oral intake, urinary output, labs, and treatment plans  - Assess nutrition and hydration status and recommend course of action  - Evaluate amount of meals eaten  - Assist patient with eating if necessary   - Allow adequate time for meals  - Recommend/ encourage appropriate diets, oral nutritional supplements, and vitamin/mineral supplements  - Order, calculate, and assess calorie counts as needed  - Recommend, monitor, and adjust tube feedings and TPN/PPN based on assessed needs  - Assess need for intravenous fluids  - Provide specific nutrition/hydration education as appropriate  - Include patient/family/caregiver in decisions related to nutrition  Outcome: Progressing

## 2020-07-06 NOTE — ASSESSMENT & PLAN NOTE
Secondary to LV thrombus he is currently on Lovenox to Coumadin bridge INR should be between 2-3 discussed with Cardiology as well  INR still subtherapeutic will increase Coumadin to 7 5 mg po x1(7/6) daily started 7/2 with heparin bridge  INR 2-3  Continue metoprolol  Will discontinue heparin done on 07/4 and switch him to Lovenox bridge as he is hard stick all in bruises from multiple blood draws

## 2020-07-06 NOTE — ASSESSMENT & PLAN NOTE
No results found for: HGBA1C    No results for input(s): POCGLU in the last 72 hours      Blood Sugar Average: Last 72 hrs:   sliding scale  Continue gabapentin  Controlled

## 2020-07-06 NOTE — ASSESSMENT & PLAN NOTE
· I cannot find any records revealing that this is a chronic thrombus as discussed with Cardiology possibly had in the past   Had a 2D echo he does have an LV thrombus yesterday recommendation for at least for the thrombus 3 months anticoagulation but he does have atrial fibrillation      · Increase Coumadin to 7 5 mg po x1(7/5) mg daily INR tomorrow continue lovenox  and Coumadin bridge till INR is 2

## 2020-07-06 NOTE — ASSESSMENT & PLAN NOTE
· TSH is normal  · Continue nutritional supplement  · Continue diet  · Speech and swallow, nutritional consult appreciated  · Patient's poor p o  Intake with feeling of dysphagia suspect secondary to increased anxiety as no physical findings   Reviewed input done by Psychiatry  Will make some adjustments a but he did gain weight since he has been here upto 128 lb on admission was 120 lb   · I did have a detailed discussion with his primary care physician yesterday Dr Kilo Brand- in agreement that this is psychological   Patient is unable to return his assisted living as they are not able to accommodate his diet patient does need a 24 hour supervision and speech continuation working  He has been eating 50%-75% of the meals and his meds are being crushed without any issue although he at times does still complain certain things  Discussed with son in detail and they are in agreement that patient will go to NH on discharge    · Bun down   · Continue strict I's and O's and daily weights

## 2020-07-06 NOTE — ASSESSMENT & PLAN NOTE
Malnutrition Findings:   Malnutrition type: Chronic illness  Degree of Malnutrition: Other severe protein calorie malnutrition(related to oropharyngeal dysphagia as evidenced by < 75% energy intake > 1 mo, severe muscle wasting noted at pectoralis, deltoid, interosseous muscles, moderate fat loss at orbitals and thoracic region, 16 1% wt loss x 8 mo (10/1/19 143#, 6/27/20 120#))  Weight gain of 8 lb since admisssion  BMI Findings:  BMI Classifications: Underweight < 18 5     Body mass index is 18 41 kg/m²

## 2020-07-06 NOTE — ASSESSMENT & PLAN NOTE
· Resolved- just some bruising AC region yesterday when fluids were infusing fluids were discontinued IV was removed he had warm compresses suspected filtration today it is much better

## 2020-07-06 NOTE — ASSESSMENT & PLAN NOTE
· I suspect secondary to his increased anxiety as he is scared to eat but there is no evidence of physical hindrance  of dysphagia  Also he does not have any teeth so he will continue modified diet puree  I did ask Psychiatry to evaluate the patient as well  Psychiatry evaluated patient  I will not increase his nighttime Remeron secondary to some hypotension before and to avoid oversedation   But I add Xanax instead of p r n  Anxiety is p r n  30 minutes prior to food as he does feel anxious to eat  But he has been gaining weight while he is here continue pureed diet he is also does not have any dentures  · GI, speech and swallow recommendation appreciated-follow video swallow evaluation  As per GI note, patient does not want EGD I discussed with patient again if he wants to have the EGD but he kind of went around the [place]  · S/P esophagogram on 11/19:Limited examination revealing no gross hypopharyngeal or esophageal  abnormalities  He had another 1 today did not show any physical abnormalities  · See above management  · Actually had a discussion with the patient regarding the PEG tube as his PCP recommended as well patient declined    · Weight up to 128 lb

## 2020-07-06 NOTE — SOCIAL WORK
[facility] is unable to accept pt at this time  Additional referral placed as per pts son's request to : [facility], [address] at [facility] and [facility] not able to accept pt at this time  [address] [facility] are unable to accept at this time  [facility] is requesting a level II PASARR and an UMR be done  [facility] has not yet replied to referral, CM placed call to admission, awaiting return call

## 2020-07-06 NOTE — PLAN OF CARE
Problem: Potential for Falls  Goal: Patient will remain free of falls  Description  INTERVENTIONS:  - Assess patient frequently for physical needs  -  Identify cognitive and physical deficits and behaviors that affect risk of falls  -  [place] fall precautions as indicated by assessment   - Educate patient/family on patient safety including physical limitations  - Instruct patient to call for assistance with activity based on assessment  - Modify environment to reduce risk of injury  - Consider OT/PT consult to assist with strengthening/mobility  Outcome: Progressing     Problem: Prexisting or High Potential for Compromised Skin Integrity  Goal: Skin integrity is maintained or improved  Description  INTERVENTIONS:  - Identify patients at risk for skin breakdown  - Assess and monitor skin integrity  - Assess and monitor nutrition and hydration status  - Monitor labs   - Assess for incontinence   - Turn and reposition patient  - Assist with mobility/ambulation  - Relieve pressure over bony prominences  - Avoid friction and shearing  - Provide appropriate hygiene as needed including keeping skin clean and dry  - Evaluate need for skin moisturizer/barrier cream  - Collaborate with interdisciplinary team   - Patient/family teaching  - Consider wound care consult   Outcome: Progressing     Problem: Nutrition/Hydration-ADULT  Goal: Nutrient/Hydration intake appropriate for improving, restoring or maintaining nutritional needs  Description  Monitor and assess patient's nutrition/hydration status for malnutrition  Collaborate with interdisciplinary team and initiate plan and interventions as ordered  Monitor patient's weight and dietary intake as ordered or per policy  Utilize nutrition screening tool and intervene as necessary  Determine patient's food preferences and provide high-protein, high-caloric foods as appropriate       INTERVENTIONS:  - Monitor oral intake, urinary output, labs, and treatment plans  - Assess nutrition and hydration status and recommend course of action  - Evaluate amount of meals eaten  - Assist patient with eating if necessary   - Allow adequate time for meals  - Recommend/ encourage appropriate diets, oral nutritional supplements, and vitamin/mineral supplements  - Order, calculate, and assess calorie counts as needed  - Recommend, monitor, and adjust tube feedings and TPN/PPN based on assessed needs  - Assess need for intravenous fluids  - Provide specific nutrition/hydration education as appropriate  - Include patient/family/caregiver in decisions related to nutrition  Outcome: Progressing

## 2020-07-06 NOTE — ASSESSMENT & PLAN NOTE
· plt dropped from 115 on 6/29 to now 97, ok to proceed with lovenox and coumadine bridge- was on heparin drip was dced on 07/4    · Monitor closely- will check vitamin D82 and folic acid- platelet went up to 82,970  Vitamin B12 is deficient according to his HGB above 400 will start p o  Treatment

## 2020-07-07 LAB
INR PPP: 1.73 (ref 0.84–1.19)
PROTHROMBIN TIME: 20.4 SECONDS (ref 11.6–14.5)
SARS-COV-2 RNA RESP QL NAA+PROBE: NEGATIVE

## 2020-07-07 PROCEDURE — 87635 SARS-COV-2 COVID-19 AMP PRB: CPT | Performed by: FAMILY MEDICINE

## 2020-07-07 PROCEDURE — 99232 SBSQ HOSP IP/OBS MODERATE 35: CPT | Performed by: FAMILY MEDICINE

## 2020-07-07 PROCEDURE — 85610 PROTHROMBIN TIME: CPT | Performed by: FAMILY MEDICINE

## 2020-07-07 RX ADMIN — ENOXAPARIN SODIUM 60 MG: 60 INJECTION SUBCUTANEOUS at 09:13

## 2020-07-07 RX ADMIN — ROPINIROLE HYDROCHLORIDE 0.5 MG: 0.25 TABLET, FILM COATED ORAL at 22:10

## 2020-07-07 RX ADMIN — DOCUSATE SODIUM 100 MG: 100 CAPSULE, LIQUID FILLED ORAL at 17:00

## 2020-07-07 RX ADMIN — SERTRALINE HYDROCHLORIDE 25 MG: 25 TABLET ORAL at 09:13

## 2020-07-07 RX ADMIN — ATORVASTATIN CALCIUM 10 MG: 10 TABLET, FILM COATED ORAL at 09:12

## 2020-07-07 RX ADMIN — CYANOCOBALAMIN TAB 500 MCG 1000 MCG: 500 TAB at 09:12

## 2020-07-07 RX ADMIN — GABAPENTIN 100 MG: 100 CAPSULE ORAL at 22:10

## 2020-07-07 RX ADMIN — ALPRAZOLAM 0.25 MG: 0.25 TABLET ORAL at 22:10

## 2020-07-07 RX ADMIN — MIRTAZAPINE 15 MG: 15 TABLET, FILM COATED ORAL at 22:10

## 2020-07-07 RX ADMIN — METOPROLOL TARTRATE 25 MG: 25 TABLET, FILM COATED ORAL at 09:13

## 2020-07-07 RX ADMIN — ENOXAPARIN SODIUM 60 MG: 60 INJECTION SUBCUTANEOUS at 22:08

## 2020-07-07 RX ADMIN — DOCUSATE SODIUM 100 MG: 100 CAPSULE, LIQUID FILLED ORAL at 09:13

## 2020-07-07 RX ADMIN — ASPIRIN 81 MG: 81 TABLET, COATED ORAL at 09:13

## 2020-07-07 RX ADMIN — QUETIAPINE FUMARATE 50 MG: 25 TABLET ORAL at 22:10

## 2020-07-07 RX ADMIN — PANTOPRAZOLE SODIUM 40 MG: 40 TABLET, DELAYED RELEASE ORAL at 09:12

## 2020-07-07 RX ADMIN — LEVOTHYROXINE SODIUM 50 MCG: 50 TABLET ORAL at 09:12

## 2020-07-07 RX ADMIN — TAMSULOSIN HYDROCHLORIDE 0.4 MG: 0.4 CAPSULE ORAL at 16:56

## 2020-07-07 RX ADMIN — LISINOPRIL 5 MG: 5 TABLET ORAL at 09:12

## 2020-07-07 RX ADMIN — SUCRALFATE 1 G: 1 TABLET ORAL at 17:00

## 2020-07-07 RX ADMIN — SUCRALFATE 1 G: 1 TABLET ORAL at 09:13

## 2020-07-07 NOTE — ASSESSMENT & PLAN NOTE
· TSH is normal  · Continue nutritional supplement  · Continue diet  · Speech and swallow, nutritional consult appreciated  · Patient's poor p o  Intake with feeling of dysphagia suspect secondary to increased anxiety as no physical findings   Reviewed input done by Psychiatry  Will make some adjustments a but he did gain weight since he has been here upto 128 lb on admission was 120 lb   · I did have a detailed discussion with his primary care physician yesterday Dr Arlene Aguilar- in agreement that this is psychological   Patient is unable to return his assisted living as they are not able to accommodate his diet patient does need a 24 hour supervision and speech continuation working  He has been eating 50%-75% of the meals and his meds are being crushed without any issue although he at times does still complain certain things  Discussed with son in detail and they are in agreement that patient will go to NH on discharge    · Bun down   · Continue strict I's and O's and daily weights

## 2020-07-07 NOTE — PROGRESS NOTES
Progress Note - Sandra Foster 1940, 68 y o  male MRN: [id number]    Unit/Bed#: -01 Encounter: [id number]    Primary Care Provider: Deedee Berry MD   Date and time admitted to hospital: 6/27/2020 12:53 PM        Left ventricular apical thrombus  Assessment & Plan  · I cannot find any records revealing that this is a chronic thrombus as discussed with Cardiology possibly had in the past   Had a 2D echo he does have an LV thrombus yesterday recommendation for at least for the thrombus 3 months anticoagulation but he does have atrial fibrillation  · Increase Coumadin to 7 5 mg po x1(7/5) mg daily INR tomorrow continue lovenox  and Coumadin bridge till INR is 2-INR is 1 73  Will continue 7 5 mg Coumadin 1 more days  And recheck INR tomorrow    Permanent atrial fibrillation [facility])  Assessment & Plan    Secondary to LV thrombus he is currently on Lovenox to Coumadin bridge INR should be between 2-3 discussed with Cardiology as well  INR still subtherapeutic will increase Coumadin to 7 5 mg po x1(7/6) daily started 7/2 with heparin bridge  INR 2-3  Continue metoprolol  on Lovenox bridge as he is hard stick all in bruises from multiple blood draws  INR is 1 73-will continue Coumadin 7 5 mg today-will recheck INR tomorrow, if INR is not is therapeutic, will increase Coumadin    * Oropharyngeal dysphagia  Assessment & Plan  · I suspect secondary to his increased anxiety as he is scared to eat but there is no evidence of physical hindrance  of dysphagia  Also he does not have any teeth so he will continue modified diet puree  I did ask Psychiatry to evaluate the patient as well  Psychiatry evaluated patient  I will not increase his nighttime Remeron secondary to some hypotension before and to avoid oversedation   But I add Xanax instead of p r n  Anxiety is p r n  30 minutes prior to food as he does feel anxious to eat    But he has been gaining weight while he is here continue pureed diet he is also does not have any dentures  · GI, speech and swallow recommendation appreciated-follow video swallow evaluation  As per GI note, patient does not want EGD I discussed with patient again if he wants to have the EGD but he kind of went around the [place]  · S/P esophagogram on 11/19:Limited examination revealing no gross hypopharyngeal or esophageal  abnormalities  He had another 1 today did not show any physical abnormalities  · See above management  · Actually had a discussion with the patient regarding the PEG tube as his PCP recommended as well patient declined  · Weight up to 128 lb    Vitamin B12 deficiency  Assessment & Plan  · Level is less than 400 at his age above  Will start vitamin B12 1000 my g p o  Daily    Chronic idiopathic thrombocytopenia (HCC)  Assessment & Plan  · plt dropped from 115 on 6/29 to now 97, ok to proceed with lovenox and coumadine bridge- was on heparin drip was dced on 07/4    · Monitor closely, platelet went up to 23,530  Vitamin B12 is deficient ,start p o  Treatment  Localized swelling of right upper extremity  Assessment & Plan  · Resolved- just some bruising AC region yesterday when fluids were infusing fluids were discontinued IV was removed he had warm compresses suspected filtration today it is much better  Severe protein-calorie malnutrition (HCC)  Assessment & Plan  Malnutrition Findings:   Malnutrition type: Chronic illness  Degree of Malnutrition: Other severe protein calorie malnutrition(related to oropharyngeal dysphagia as evidenced by < 75% energy intake > 1 mo, severe muscle wasting noted at pectoralis, deltoid, interosseous muscles, moderate fat loss at orbitals and thoracic region, 16 1% wt loss x 8 mo (10/1/19 143#, 6/27/20 120#))  Weight gain of 8 lb since admisssion  BMI Findings:  BMI Classifications: Underweight < 18 5     Body mass index is 18 28 kg/m²         Mixed obsessional thoughts and acts  Assessment & Plan  Severe nature  Continue current treatment and management    Physical deconditioning  Assessment & Plan  - will be discharged to nursing home anticipate early in the week    Chronic combined systolic and diastolic congestive heart failure ([facility] )  Assessment & Plan  · 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   As reviewed per records  · He is compensated  He is actually failure to thrive no need for any diuretics  He is EF is 35-40% with grade 3 diastolic dysfunction on the echocardiogram    · Initially lisinopril was lowered to 5 mg daily secondary to low blood pressure in a blood pressure stable continue metoprolol as well  Bipolar 1 disorder, depressed ([facility] )  Assessment & Plan  Continue home medication      Type 2 diabetes mellitus with diabetic neuropathy, without long-term current use of insulin ([place] [id number] )  Assessment & Plan  No results found for: HGBA1C    No results for input(s): POCGLU in the last 72 hours  Blood Sugar Average: Last 72 hrs:   sliding scale  Continue gabapentin  Controlled    Urinary retention  Assessment & Plan  History of urinary retention  Follow urinary retention protocol  No issues    Anxiety  Assessment & Plan  Continue home medication  Uncontrolled that is causing him not T eat much as he is scared to eat  Consulted Psychiatry to see if any medication needs to be adjusted  Reviewed recommendation all actually place Xanax 30 minutes prior to each meal to see if that helps    Generalized weakness  Assessment & Plan  Secondary to multiple factors  Continue nutritional supplement  Eating weight gain to 128 lb   Follow PT OT, speech evaluation- final disposition to nursing home      Failure to thrive in adult  Assessment & Plan  · TSH is normal  · Continue nutritional supplement  · Continue diet  · Speech and swallow, nutritional consult appreciated  · Patient's poor p o  Intake with feeling of dysphagia suspect secondary to increased anxiety as no physical findings   Reviewed input done by Psychiatry    Will make some adjustments a but he did gain weight since he has been here upto 128 lb on admission was 120 lb   · I did have a detailed discussion with his primary care physician yesterday Dr Anoop Leavitt- in agreement that this is psychological   Patient is unable to return his assisted living as they are not able to accommodate his diet patient does need a 24 hour supervision and speech continuation working  He has been eating 50%-75% of the meals and his meds are being crushed without any issue although he at times does still complain certain things  Discussed with son in detail and they are in agreement that patient will go to NH on discharge  · Bun down   · Continue strict I's and O's and daily weights        VTE Pharmacologic Prophylaxis:   Pharmacologic: On Lovenox with Coumadin for bridging  Mechanical VTE Prophylaxis in Place: Yes    Patient Centered Rounds: I have performed bedside rounds with nursing staff today  Education and Discussions with Family / Patient:  Yes with the patient    Time Spent for Care: 30 minutes  More than 50% of total time spent on counseling and coordination of care as described above  Current Length of Stay: 10 day(s)    Current Patient Status: Inpatient   Certification Statement: The patient will continue to require additional inpatient hospital stay due to Apical thrombus, physical deconditioning, anxiety, subtherapeutic INR    Discharge Plan / Estimated Discharge Date: To be determined      Code Status: Level 3 - DNAR and DNI      Subjective:   Seen and evaluated during the round  No significant overnight change  Objective:     Vitals:   Temp (24hrs), Av 1 °F (36 7 °C), Min:97 7 °F (36 5 °C), Max:98 7 °F (37 1 °C)    Temp:  [97 7 °F (36 5 °C)-98 7 °F (37 1 °C)] 97 7 °F (36 5 °C)  HR:  [57-58] 58  Resp:  [16-20] 16  BP: (116-131)/(62-74) 131/62  SpO2:  [99 %-100 %] 99 %  Body mass index is 18 28 kg/m²  Input and Output Summary (last 24 hours):        Intake/Output Summary (Last 24 hours) at 7/7/2020 1336  Last data filed at 7/7/2020 0915  Gross per 24 hour   Intake 120 ml   Output    Net 120 ml       Physical Exam:     Physical Exam   Constitutional: He is oriented to person, place, and time  He appears cachectic  Looks tired   HENT:   Head: Normocephalic and atraumatic  Eyes: Pupils are equal, round, and reactive to light  EOM are normal  No scleral icterus  Neck: Normal range of motion  Cardiovascular: Normal rate  Exam reveals no gallop  Murmur heard  Pulmonary/Chest: Effort normal and breath sounds normal  No respiratory distress  Abdominal: Soft  Bowel sounds are normal    Musculoskeletal: Normal range of motion  Neurological: He is alert and oriented to person, place, and time  Skin: Skin is warm  Nursing note and vitals reviewed  Additional Data:     Labs:    Results from last 7 days   Lab Units 07/06/20  0552   WBC Thousand/uL 3 58*   HEMOGLOBIN g/dL 9 9*   HEMATOCRIT % 29 5*   PLATELETS Thousands/uL 98*   NEUTROS PCT % 49   LYMPHS PCT % 33   MONOS PCT % 17*   EOS PCT % 1     Results from last 7 days   Lab Units 07/04/20  0556   POTASSIUM mmol/L 3 8   CHLORIDE mmol/L 109*   CO2 mmol/L 30   BUN mg/dL 30*   CREATININE mg/dL 0 96   CALCIUM mg/dL 8 7     Results from last 7 days   Lab Units 07/07/20  0453   INR  1 73*       * I Have Reviewed All Lab Data Listed Above  * Additional Pertinent Lab Tests Reviewed:  All Labs Within Last 24 Hours Reviewed      Last 24 Hours Medication List:     Current Facility-Administered Medications:  acetaminophen 650 mg Oral Q6H PRN Андрей Parisi MD   ALPRAZolam 0 25 mg Oral TID PRN Daniel Mahoney MD   aluminum-magnesium hydroxide-simethicone 30 mL Oral Q6H PRN Андрей Parisi MD   aspirin 81 mg Oral Daily Андрей Parisi MD   atorvastatin 10 mg Oral Daily Андрей Parisi MD   barium sulfate 1 tablet Oral Once in imaging Juan Carlos Lau MD   cyanocobalamin 1,000 mcg Oral Daily Daniel Mahoney MD   docusate sodium 100 mg Oral BID Chava Wooten MD   enoxaparin 1 mg/kg Subcutaneous Q12H [address] Fantasma Mack MD   ergocalciferol 50,000 Units Oral Weekly Chava Wooten MD   gabapentin 100 mg Oral HS Chava Wooten MD   levothyroxine 50 mcg Oral Daily Chava Wooten MD   lisinopril 5 mg Oral Daily Fantasma Mack MD   metoprolol tartrate 25 mg Oral QAM Fantasma Mack MD   mirtazapine 15 mg Oral HS Genevieve Mckinnon MD   nitroglycerin 0 4 mg Sublingual Q5 Min PRN Chava Wooten MD   ondansetron 4 mg Intravenous Q6H PRN Chava Wooten MD   pantoprazole 40 mg Oral Daily Chava Wooten MD   potassium chloride 20 mEq Oral Daily Chava Wooten MD   QUEtiapine 50 mg Oral HS Chava Wooten MD   rOPINIRole 0 5 mg Oral HS Chava Wooten MD   sertraline 25 mg Oral QA Genevieve Mckinnon MD   sodium chloride (PF) 3 mL Intravenous Q1H PRN Quyen Tristan MD   sucralfate 1 g Oral BID Chava Wooten MD   tamsulosin 0 4 mg Oral Daily With Richelle Ibarra MD        Today, Patient Was Seen By: Chava Wooten MD    ** Please Note: Dragon 360 Dictation voice to text software may have been used in the creation of this document   **

## 2020-07-07 NOTE — ASSESSMENT & PLAN NOTE
Malnutrition Findings:   Malnutrition type: Chronic illness  Degree of Malnutrition: Other severe protein calorie malnutrition(related to oropharyngeal dysphagia as evidenced by < 75% energy intake > 1 mo, severe muscle wasting noted at pectoralis, deltoid, interosseous muscles, moderate fat loss at orbitals and thoracic region, 16 1% wt loss x 8 mo (10/1/19 143#, 6/27/20 120#))  Weight gain of 8 lb since admisssion  BMI Findings:  BMI Classifications: Underweight < 18 5     Body mass index is 18 28 kg/m²

## 2020-07-07 NOTE — PLAN OF CARE
Problem: Potential for Falls  Goal: Patient will remain free of falls  Description  INTERVENTIONS:  - Assess patient frequently for physical needs  -  Identify cognitive and physical deficits and behaviors that affect risk of falls  -  [place] fall precautions as indicated by assessment   - Educate patient/family on patient safety including physical limitations  - Instruct patient to call for assistance with activity based on assessment  - Modify environment to reduce risk of injury  - Consider OT/PT consult to assist with strengthening/mobility  Outcome: Progressing     Problem: Prexisting or High Potential for Compromised Skin Integrity  Goal: Skin integrity is maintained or improved  Description  INTERVENTIONS:  - Identify patients at risk for skin breakdown  - Assess and monitor skin integrity  - Assess and monitor nutrition and hydration status  - Monitor labs   - Assess for incontinence   - Turn and reposition patient  - Assist with mobility/ambulation  - Relieve pressure over bony prominences  - Avoid friction and shearing  - Provide appropriate hygiene as needed including keeping skin clean and dry  - Evaluate need for skin moisturizer/barrier cream  - Collaborate with interdisciplinary team   - Patient/family teaching  - Consider wound care consult   Outcome: Progressing     Problem: Nutrition/Hydration-ADULT  Goal: Nutrient/Hydration intake appropriate for improving, restoring or maintaining nutritional needs  Description  Monitor and assess patient's nutrition/hydration status for malnutrition  Collaborate with interdisciplinary team and initiate plan and interventions as ordered  Monitor patient's weight and dietary intake as ordered or per policy  Utilize nutrition screening tool and intervene as necessary  Determine patient's food preferences and provide high-protein, high-caloric foods as appropriate       INTERVENTIONS:  - Monitor oral intake, urinary output, labs, and treatment plans  - Assess nutrition and hydration status and recommend course of action  - Evaluate amount of meals eaten  - Assist patient with eating if necessary   - Allow adequate time for meals  - Recommend/ encourage appropriate diets, oral nutritional supplements, and vitamin/mineral supplements  - Order, calculate, and assess calorie counts as needed  - Recommend, monitor, and adjust tube feedings and TPN/PPN based on assessed needs  - Assess need for intravenous fluids  - Provide specific nutrition/hydration education as appropriate  - Include patient/family/caregiver in decisions related to nutrition  Outcome: Progressing

## 2020-07-07 NOTE — ASSESSMENT & PLAN NOTE
· I cannot find any records revealing that this is a chronic thrombus as discussed with Cardiology possibly had in the past   Had a 2D echo he does have an LV thrombus yesterday recommendation for at least for the thrombus 3 months anticoagulation but he does have atrial fibrillation  · Increase Coumadin to 7 5 mg po x1(7/5) mg daily INR tomorrow continue lovenox  and Coumadin bridge till INR is 2-INR is 1 73  Will continue 7 5 mg Coumadin 1 more days    And recheck INR tomorrow

## 2020-07-07 NOTE — PLAN OF CARE
Problem: Potential for Falls  Goal: Patient will remain free of falls  Description  INTERVENTIONS:  - Assess patient frequently for physical needs  -  Identify cognitive and physical deficits and behaviors that affect risk of falls  -  [place] fall precautions as indicated by assessment   - Educate patient/family on patient safety including physical limitations  - Instruct patient to call for assistance with activity based on assessment  - Modify environment to reduce risk of injury  - Consider OT/PT consult to assist with strengthening/mobility  Outcome: Progressing     Problem: Prexisting or High Potential for Compromised Skin Integrity  Goal: Skin integrity is maintained or improved  Description  INTERVENTIONS:  - Identify patients at risk for skin breakdown  - Assess and monitor skin integrity  - Assess and monitor nutrition and hydration status  - Monitor labs   - Assess for incontinence   - Turn and reposition patient  - Assist with mobility/ambulation  - Relieve pressure over bony prominences  - Avoid friction and shearing  - Provide appropriate hygiene as needed including keeping skin clean and dry  - Evaluate need for skin moisturizer/barrier cream  - Collaborate with interdisciplinary team   - Patient/family teaching  - Consider wound care consult   Outcome: Progressing     Problem: Nutrition/Hydration-ADULT  Goal: Nutrient/Hydration intake appropriate for improving, restoring or maintaining nutritional needs  Description  Monitor and assess patient's nutrition/hydration status for malnutrition  Collaborate with interdisciplinary team and initiate plan and interventions as ordered  Monitor patient's weight and dietary intake as ordered or per policy  Utilize nutrition screening tool and intervene as necessary  Determine patient's food preferences and provide high-protein, high-caloric foods as appropriate       INTERVENTIONS:  - Monitor oral intake, urinary output, labs, and treatment plans  - Assess nutrition and hydration status and recommend course of action  - Evaluate amount of meals eaten  - Assist patient with eating if necessary   - Allow adequate time for meals  - Recommend/ encourage appropriate diets, oral nutritional supplements, and vitamin/mineral supplements  - Order, calculate, and assess calorie counts as needed  - Recommend, monitor, and adjust tube feedings and TPN/PPN based on assessed needs  - Assess need for intravenous fluids  - Provide specific nutrition/hydration education as appropriate  - Include patient/family/caregiver in decisions related to nutrition  Outcome: Progressing

## 2020-07-07 NOTE — ASSESSMENT & PLAN NOTE
Secondary to LV thrombus he is currently on Lovenox to Coumadin bridge INR should be between 2-3 discussed with Cardiology as well  INR still subtherapeutic will increase Coumadin to 7 5 mg po x1(7/6) daily started 7/2 with heparin bridge  INR 2-3  Continue metoprolol  on Lovenox bridge as he is hard stick all in bruises from multiple blood draws    INR is 1 73-will continue Coumadin 7 5 mg today-will recheck INR tomorrow, if INR is not is therapeutic, will increase Coumadin

## 2020-07-07 NOTE — ASSESSMENT & PLAN NOTE
· I suspect secondary to his increased anxiety as he is scared to eat but there is no evidence of physical hindrance  of dysphagia  Also he does not have any teeth so he will continue modified diet puree  I did ask Psychiatry to evaluate the patient as well  Psychiatry evaluated patient  I will not increase his nighttime Remeron secondary to some hypotension before and to avoid oversedation   But I add Xanax instead of p r n  Anxiety is p r n  30 minutes prior to food as he does feel anxious to eat  But he has been gaining weight while he is here continue pureed diet he is also does not have any dentures  · GI, speech and swallow recommendation appreciated-follow video swallow evaluation  As per GI note, patient does not want EGD I discussed with patient again if he wants to have the EGD but he kind of went around the [place]  · S/P esophagogram on 11/19:Limited examination revealing no gross hypopharyngeal or esophageal  abnormalities  He had another 1 today did not show any physical abnormalities  · See above management  · Actually had a discussion with the patient regarding the PEG tube as his PCP recommended as well patient declined    · Weight up to 128 lb

## 2020-07-07 NOTE — CASE MANAGEMENT
SNFs looking at patient is requesting a level 2 assessment  Reviewed documentation and called [facility] Admission Director: Diego Roman  Patient has not had any Psych admission since he was admitted to [facility] September 2017  No suicide attempts either  Note that he was in [facility]  10/2019 however on the 6th floor admit for a medical issue with Psych Consult  CM called and spoke to patient's daughter in law and she too confirm the above  CM reached out to [facility]: Amaay to review this case and she is going to look at the information and get back with me  CM called [facility]: spoke to [facility] LOLITA TELLO CM reviewed this clinical information and she too feels patient does not trigger for Level 2  Uploaded PASSR to [facility]  [facility] will accept however they need 2 Covid test, requesting one now      Will follow up in the AM

## 2020-07-08 PROBLEM — F32.A DEPRESSIVE DISORDER: Status: ACTIVE | Noted: 2020-07-08

## 2020-07-08 LAB
BASOPHILS # BLD AUTO: 0.01 THOUSANDS/ΜL (ref 0–0.1)
BASOPHILS NFR BLD AUTO: 0 % (ref 0–1)
EOSINOPHIL # BLD AUTO: 0.04 THOUSAND/ΜL (ref 0–0.61)
EOSINOPHIL NFR BLD AUTO: 1 % (ref 0–6)
ERYTHROCYTE [DISTWIDTH] IN BLOOD BY AUTOMATED COUNT: 15.7 % (ref 11.6–15.1)
HCT VFR BLD AUTO: 29.3 % (ref 36.5–49.3)
HGB BLD-MCNC: 9.7 G/DL (ref 12–17)
IMM GRANULOCYTES # BLD AUTO: 0.02 THOUSAND/UL (ref 0–0.2)
IMM GRANULOCYTES NFR BLD AUTO: 1 % (ref 0–2)
INR PPP: 1.9 (ref 0.84–1.19)
LYMPHOCYTES # BLD AUTO: 1.1 THOUSANDS/ΜL (ref 0.6–4.47)
LYMPHOCYTES NFR BLD AUTO: 31 % (ref 14–44)
MCH RBC QN AUTO: 32.3 PG (ref 26.8–34.3)
MCHC RBC AUTO-ENTMCNC: 33.1 G/DL (ref 31.4–37.4)
MCV RBC AUTO: 98 FL (ref 82–98)
MONOCYTES # BLD AUTO: 0.68 THOUSAND/ΜL (ref 0.17–1.22)
MONOCYTES NFR BLD AUTO: 19 % (ref 4–12)
NEUTROPHILS # BLD AUTO: 1.71 THOUSANDS/ΜL (ref 1.85–7.62)
NEUTS SEG NFR BLD AUTO: 48 % (ref 43–75)
NRBC BLD AUTO-RTO: 0 /100 WBCS
PLATELET # BLD AUTO: 113 THOUSANDS/UL (ref 149–390)
PMV BLD AUTO: 9.1 FL (ref 8.9–12.7)
PROTHROMBIN TIME: 22 SECONDS (ref 11.6–14.5)
RBC # BLD AUTO: 3 MILLION/UL (ref 3.88–5.62)
WBC # BLD AUTO: 3.56 THOUSAND/UL (ref 4.31–10.16)

## 2020-07-08 PROCEDURE — 85025 COMPLETE CBC W/AUTO DIFF WBC: CPT | Performed by: FAMILY MEDICINE

## 2020-07-08 PROCEDURE — 85610 PROTHROMBIN TIME: CPT | Performed by: FAMILY MEDICINE

## 2020-07-08 PROCEDURE — 99232 SBSQ HOSP IP/OBS MODERATE 35: CPT | Performed by: FAMILY MEDICINE

## 2020-07-08 RX ORDER — MIRTAZAPINE 15 MG/1
30 TABLET, FILM COATED ORAL
Status: DISCONTINUED | OUTPATIENT
Start: 2020-07-08 | End: 2020-07-09 | Stop reason: HOSPADM

## 2020-07-08 RX ADMIN — ENOXAPARIN SODIUM 60 MG: 60 INJECTION SUBCUTANEOUS at 09:17

## 2020-07-08 RX ADMIN — DOCUSATE SODIUM 100 MG: 100 CAPSULE, LIQUID FILLED ORAL at 09:15

## 2020-07-08 RX ADMIN — CYANOCOBALAMIN TAB 500 MCG 1000 MCG: 500 TAB at 09:17

## 2020-07-08 RX ADMIN — ASPIRIN 81 MG: 81 TABLET, COATED ORAL at 09:16

## 2020-07-08 RX ADMIN — DOCUSATE SODIUM 100 MG: 100 CAPSULE, LIQUID FILLED ORAL at 17:00

## 2020-07-08 RX ADMIN — MIRTAZAPINE 30 MG: 15 TABLET, FILM COATED ORAL at 21:15

## 2020-07-08 RX ADMIN — SERTRALINE HYDROCHLORIDE 25 MG: 25 TABLET ORAL at 09:16

## 2020-07-08 RX ADMIN — QUETIAPINE FUMARATE 50 MG: 25 TABLET ORAL at 21:15

## 2020-07-08 RX ADMIN — ENOXAPARIN SODIUM 60 MG: 60 INJECTION SUBCUTANEOUS at 21:16

## 2020-07-08 RX ADMIN — POTASSIUM CHLORIDE 20 MEQ: 20 SOLUTION ORAL at 09:19

## 2020-07-08 RX ADMIN — ATORVASTATIN CALCIUM 10 MG: 10 TABLET, FILM COATED ORAL at 09:16

## 2020-07-08 RX ADMIN — SUCRALFATE 1 G: 1 TABLET ORAL at 17:00

## 2020-07-08 RX ADMIN — LEVOTHYROXINE SODIUM 50 MCG: 50 TABLET ORAL at 09:17

## 2020-07-08 RX ADMIN — LISINOPRIL 5 MG: 5 TABLET ORAL at 09:16

## 2020-07-08 RX ADMIN — ALPRAZOLAM 0.25 MG: 0.25 TABLET ORAL at 17:00

## 2020-07-08 RX ADMIN — ROPINIROLE HYDROCHLORIDE 0.5 MG: 0.25 TABLET, FILM COATED ORAL at 21:16

## 2020-07-08 RX ADMIN — TAMSULOSIN HYDROCHLORIDE 0.4 MG: 0.4 CAPSULE ORAL at 17:00

## 2020-07-08 RX ADMIN — GABAPENTIN 100 MG: 100 CAPSULE ORAL at 21:16

## 2020-07-08 RX ADMIN — SUCRALFATE 1 G: 1 TABLET ORAL at 09:16

## 2020-07-08 RX ADMIN — METOPROLOL TARTRATE 25 MG: 25 TABLET, FILM COATED ORAL at 09:17

## 2020-07-08 RX ADMIN — PANTOPRAZOLE SODIUM 40 MG: 40 TABLET, DELAYED RELEASE ORAL at 09:16

## 2020-07-08 NOTE — ASSESSMENT & PLAN NOTE
· Platelet is trending up, 20 platelet is 1 1 3  ·   Vitamin B12 is deficient ,start p o  Treatment

## 2020-07-08 NOTE — PLAN OF CARE
Problem: Potential for Falls  Goal: Patient will remain free of falls  Description  INTERVENTIONS:  - Assess patient frequently for physical needs  -  Identify cognitive and physical deficits and behaviors that affect risk of falls  -  [place] fall precautions as indicated by assessment   - Educate patient/family on patient safety including physical limitations  - Instruct patient to call for assistance with activity based on assessment  - Modify environment to reduce risk of injury  - Consider OT/PT consult to assist with strengthening/mobility  Outcome: Progressing     Problem: Nutrition/Hydration-ADULT  Goal: Nutrient/Hydration intake appropriate for improving, restoring or maintaining nutritional needs  Description  Monitor and assess patient's nutrition/hydration status for malnutrition  Collaborate with interdisciplinary team and initiate plan and interventions as ordered  Monitor patient's weight and dietary intake as ordered or per policy  Utilize nutrition screening tool and intervene as necessary  Determine patient's food preferences and provide high-protein, high-caloric foods as appropriate       INTERVENTIONS:  - Monitor oral intake, urinary output, labs, and treatment plans  - Assess nutrition and hydration status and recommend course of action  - Evaluate amount of meals eaten  - Assist patient with eating if necessary   - Allow adequate time for meals  - Recommend/ encourage appropriate diets, oral nutritional supplements, and vitamin/mineral supplements  - Order, calculate, and assess calorie counts as needed  - Recommend, monitor, and adjust tube feedings and TPN/PPN based on assessed needs  - Assess need for intravenous fluids  - Provide specific nutrition/hydration education as appropriate  - Include patient/family/caregiver in decisions related to nutrition  Outcome: Progressing     Problem: Prexisting or High Potential for Compromised Skin Integrity  Goal: Skin integrity is maintained or improved  Description  INTERVENTIONS:  - Identify patients at risk for skin breakdown  - Assess and monitor skin integrity  - Assess and monitor nutrition and hydration status  - Monitor labs   - Assess for incontinence   - Turn and reposition patient  - Assist with mobility/ambulation  - Relieve pressure over bony prominences  - Avoid friction and shearing  - Provide appropriate hygiene as needed including keeping skin clean and dry  - Evaluate need for skin moisturizer/barrier cream  - Collaborate with interdisciplinary team   - Patient/family teaching  - Consider wound care consult   Outcome: Progressing

## 2020-07-08 NOTE — ASSESSMENT & PLAN NOTE
Continue home medication  Uncontrolled that is causing him not To eat much as he is scared to eat  Consulted Psychiatry to see if any medication needs to be adjusted    Reviewed recommendation all actually place Xanax 30 minutes prior to each meal to see if that helps  Also Remeron increased to 30 mg as per psychiatric recommendation to increase appetite

## 2020-07-08 NOTE — PROGRESS NOTES
Progress Note - Saida Ramsey 1940, 68 y o  male MRN: [id number]    Unit/Bed#: -[facility] Encounter: [id number]    Primary Care Provider: Arizona Lefort, MD   Date and time admitted to hospital: 6/27/2020 12:53 PM        Left ventricular apical thrombus  Assessment & Plan  · I cannot find any records revealing that this is a chronic thrombus as discussed with Cardiology possibly had in the past   Had a 2D echo he does have an LV thrombus yesterday recommendation for at least for the thrombus 3 months anticoagulation but he does have atrial fibrillation  · Continue Coumadin 7 5 mg along with Lovenox  INR is trending up, today INR is 1 90-will recheck INR tomorrow    Permanent atrial fibrillation [facility])  Assessment & Plan    Secondary to LV thrombus he is currently on Lovenox to Coumadin bridge INR should be between 2-3 discussed with Cardiology as well  INR still subtherapeutic will increase Coumadin to 7 5 mg po x1(7/6) daily started 7/2 with heparin bridge  INR 2-3  Continue metoprolol  on Lovenox bridge as he is hard stick all in bruises from multiple blood draws  INR is 1 90-will continue Coumadin 7 5 mg , as INR is trending up-will recheck INR tomorrow    * Oropharyngeal dysphagia  Assessment & Plan  · I suspect secondary to his increased anxiety as he is scared to eat but there is no evidence of physical hindrance  of dysphagia  Also he does not have any teeth so he will continue modified diet puree  I did ask Psychiatry to evaluate the patient as well  Psychiatry evaluated patient  I will not increase his nighttime Remeron secondary to some hypotension before and to avoid oversedation   But I add Xanax instead of p r n  Anxiety is p r n  30 minutes prior to food as he does feel anxious to eat  But he has been gaining weight while he is here continue pureed diet he is also does not have any dentures      · GI, speech and swallow recommendation appreciated-follow video swallow evaluation  As per GI note, patient does not want EGD I discussed with patient again if he wants to have the EGD but he kind of went around the [place]  · S/P esophagogram on 11/19:Limited examination revealing no gross hypopharyngeal or esophageal  abnormalities  He had another 1 today did not show any physical abnormalities  · See above management  · Actually had a discussion with the patient regarding the PEG tube as his PCP recommended as well patient declined  · Weight up to 125 lb    Depressive disorder  Assessment & Plan  Appreciate psychiatric recommendation  Continue current treatment and management      Vitamin B12 deficiency  Assessment & Plan  ·  vitamin B12 1000 my g p o  Daily    Chronic idiopathic thrombocytopenia (HCC)  Assessment & Plan  · Platelet is trending up, 20 platelet is 1 1 3  ·   Vitamin B12 is deficient ,start p o  Treatment  Localized swelling of right upper extremity  Assessment & Plan  · Resolved- just some bruising AC region yesterday when fluids were infusing fluids were discontinued IV was removed he had warm compresses suspected filtration today it is much better  Severe protein-calorie malnutrition (HCC)  Assessment & Plan  Malnutrition Findings:   Malnutrition type: Chronic illness  Degree of Malnutrition: Other severe protein calorie malnutrition(related to oropharyngeal dysphagia as evidenced by < 75% energy intake > 1 mo, severe muscle wasting noted at pectoralis, deltoid, interosseous muscles, moderate fat loss at orbitals and thoracic region, 16 1% wt loss x 8 mo (10/1/19 143#, 6/27/20 120#))  Weight gain of 8 lb since admisssion  BMI Findings:  BMI Classifications: Underweight < 18 5     Body mass index is 17 94 kg/m²         Mixed obsessional thoughts and acts  Assessment & Plan  Severe nature  Continue current treatment and management    Physical deconditioning  Assessment & Plan  - will be discharged to nursing home anticipate early in the week    Chronic combined systolic and diastolic congestive heart failure ([facility] )  Assessment & Plan  · 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   As reviewed per records  · He is compensated  He is actually failure to thrive no need for any diuretics  He is EF is 35-40% with grade 3 diastolic dysfunction on the echocardiogram    · Initially lisinopril was lowered to 5 mg daily secondary to low blood pressure in a blood pressure stable continue metoprolol as well  Bipolar 1 disorder, depressed ([facility] )  Assessment & Plan  Continue home medication  Psychiatric evaluation evaluate appreciated      Type 2 diabetes mellitus with diabetic neuropathy, without long-term current use of insulin ([facility])  Assessment & Plan  No results found for: HGBA1C    No results for input(s): POCGLU in the last 72 hours  Blood Sugar Average: Last 72 hrs:   sliding scale  Continue gabapentin  Controlled    Urinary retention  Assessment & Plan  History of urinary retention  Follow urinary retention protocol  No issues    Anxiety  Assessment & Plan  Continue home medication  Uncontrolled that is causing him not To eat much as he is scared to eat  Consulted Psychiatry to see if any medication needs to be adjusted  Reviewed recommendation all actually place Xanax 30 minutes prior to each meal to see if that helps  Also Remeron increased to 30 mg as per psychiatric recommendation to increase appetite    Generalized weakness  Assessment & Plan  Secondary to multiple factors  Continue nutritional supplement  Eating weight gain to 125 lb   Follow PT OT, speech evaluation- final disposition to nursing home      Failure to thrive in adult  Assessment & Plan  · TSH is normal  · Continue nutritional supplement  · Continue diet  · Speech and swallow, nutritional consult appreciated  · Patient's poor p o  Intake with feeling of dysphagia suspect secondary to increased anxiety as no physical findings   Reviewed input done by Psychiatry    Will make some adjustments a but he did gain weight since he has been here upto 125 lb on admission was 120 lb   · Hospital team did have a detailed discussion with his primary care physician Dr Margot Rooney- in agreement that this is psychological   Patient is unable to return his assisted living as they are not able to accommodate his diet patient does need a 24 hour supervision and speech continuation working  He has been eating 50%-75% of the meals and his meds are being crushed without any issue although he at times does still complain certain things  Discussed with son in detail and they are in agreement that patient will go to NH on discharge  · Sun down   · Continue strict I's and O's and daily weights  · Mirtazapine dose increased to 30 mg        VTE Pharmacologic Prophylaxis:   Pharmacologic: Warfarin (Coumadin)  Mechanical VTE Prophylaxis in Place: Yes    Patient Centered Rounds: I have performed bedside rounds with nursing staff today  Education and Discussions with Family / Patient:  Yes with the patient    Time Spent for Care: 30 minutes  More than 50% of total time spent on counseling and coordination of care as described above  Current Length of Stay: 11 day(s)    Current Patient Status: Inpatient   Certification Statement: The patient will continue to require additional inpatient hospital stay due to Failure to thrive, left apical thrombus physical deconditioning, failure to thrive    Discharge Plan / Estimated Discharge Date: To be determined      Code Status: Level 3 - DNAR and DNI      Subjective:   Seen and evaluated during the round  No significant overnight issues patient is still complaining of difficulty swallowing    Denies any suicidal homicide ideation    Objective:     Vitals:   Temp (24hrs), Av 9 °F (36 6 °C), Min:97 5 °F (36 4 °C), Max:98 2 °F (36 8 °C)    Temp:  [97 5 °F (36 4 °C)-98 2 °F (36 8 °C)] 97 5 °F (36 4 °C)  HR:  [62-68] 62  Resp:  [16-18] 16  BP: (123-124)/(63-64) 123/63  SpO2:  [100 %] 100 %  Body mass index is 17 94 kg/m²  Input and Output Summary (last 24 hours): Intake/Output Summary (Last 24 hours) at 7/8/2020 1439  Last data filed at 7/8/2020 1241  Gross per 24 hour   Intake 180 ml   Output    Net 180 ml       Physical Exam:     Physical Exam   Constitutional: He is oriented to person, place, and time  He appears cachectic  HENT:   Mouth/Throat: Oropharynx is clear and moist    Eyes: Pupils are equal, round, and reactive to light  EOM are normal    Neck: Normal range of motion  Cardiovascular: Normal rate  Exam reveals no gallop and no friction rub  Pulmonary/Chest: Effort normal and breath sounds normal  No stridor  No respiratory distress  Abdominal: Soft  Bowel sounds are normal  He exhibits no distension  There is no tenderness  There is no guarding  Musculoskeletal: Normal range of motion  He exhibits no edema  Neurological: He is alert and oriented to person, place, and time  He displays normal reflexes  No cranial nerve deficit  Coordination normal    Skin: Skin is warm  Capillary refill takes less than 2 seconds  Psychiatric: He has a normal mood and affect  Nursing note and vitals reviewed  Additional Data:     Labs:    Results from last 7 days   Lab Units 07/08/20  0434   WBC Thousand/uL 3 56*   HEMOGLOBIN g/dL 9 7*   HEMATOCRIT % 29 3*   PLATELETS Thousands/uL 113*   NEUTROS PCT % 48   LYMPHS PCT % 31   MONOS PCT % 19*   EOS PCT % 1     Results from last 7 days   Lab Units 07/04/20  0556   POTASSIUM mmol/L 3 8   CHLORIDE mmol/L 109*   CO2 mmol/L 30   BUN mg/dL 30*   CREATININE mg/dL 0 96   CALCIUM mg/dL 8 7     Results from last 7 days   Lab Units 07/08/20  0434   INR  1 90*       * I Have Reviewed All Lab Data Listed Above  * Additional Pertinent Lab Tests Reviewed:  All Labs Within Last 24 Hours Reviewed      Last 24 Hours Medication List:     Current Facility-Administered Medications:  acetaminophen 650 mg Oral Q6H PRN Juwan Sullivan MD   ALPRAZolam 0 25 mg Oral TID PRN Brock Unger MD Agustín   aluminum-magnesium hydroxide-simethicone 30 mL Oral Q6H PRN Case Raymond MD   aspirin 81 mg Oral Daily Case Raymond MD   atorvastatin 10 mg Oral Daily Case Raymond MD   barium sulfate 1 tablet Oral Once in imaging Natacha Hatfield MD   cyanocobalamin 1,000 mcg Oral Daily Jenny Asencio MD   docusate sodium 100 mg Oral BID Case Raymond MD   enoxaparin 1 mg/kg Subcutaneous Q12H [address] Jenny Asencio MD   ergocalciferol 50,000 Units Oral Weekly Case Raymond MD   gabapentin 100 mg Oral HS Case Raymond MD   levothyroxine 50 mcg Oral Daily Case Raymond MD   lisinopril 5 mg Oral Daily Jenny Asencio MD   metoprolol tartrate 25 mg Oral QAM Jenny Asencio MD   mirtazapine 30 mg Oral HS Genevieve Mckinnon MD   nitroglycerin 0 4 mg Sublingual Q5 Min PRN Case Raymond MD   ondansetron 4 mg Intravenous Q6H PRN Case Raymond MD   pantoprazole 40 mg Oral Daily Case Raymond MD   potassium chloride 20 mEq Oral Daily Case Raymond MD   QUEtiapine 50 mg Oral HS Case Raymond MD   rOPINIRole 0 5 mg Oral HS Case Raymond MD   sertraline 25 mg Oral QAM Case Raymond MD   sodium chloride (PF) 3 mL Intravenous Q1H PRN Josesito Singh MD   sucralfate 1 g Oral BID Case Raymond MD   tamsulosin 0 4 mg Oral Daily With Diomedes Chin MD        Today, Patient Was Seen By: Case Raymond MD    ** Please Note: Dragon 360 Dictation voice to text software may have been used in the creation of this document   **

## 2020-07-08 NOTE — ASSESSMENT & PLAN NOTE
· I suspect secondary to his increased anxiety as he is scared to eat but there is no evidence of physical hindrance  of dysphagia  Also he does not have any teeth so he will continue modified diet puree  I did ask Psychiatry to evaluate the patient as well  Psychiatry evaluated patient  I will not increase his nighttime Remeron secondary to some hypotension before and to avoid oversedation   But I add Xanax instead of p r n  Anxiety is p r n  30 minutes prior to food as he does feel anxious to eat  But he has been gaining weight while he is here continue pureed diet he is also does not have any dentures  · GI, speech and swallow recommendation appreciated-follow video swallow evaluation  As per GI note, patient does not want EGD I discussed with patient again if he wants to have the EGD but he kind of went around the [place]  · S/P esophagogram on 11/19:Limited examination revealing no gross hypopharyngeal or esophageal  abnormalities  He had another 1 today did not show any physical abnormalities  · See above management  · Actually had a discussion with the patient regarding the PEG tube as his PCP recommended as well patient declined    · Weight up to 125 lb

## 2020-07-08 NOTE — ASSESSMENT & PLAN NOTE
Malnutrition Findings:   Malnutrition type: Chronic illness  Degree of Malnutrition: Other severe protein calorie malnutrition(related to oropharyngeal dysphagia as evidenced by < 75% energy intake > 1 mo, severe muscle wasting noted at pectoralis, deltoid, interosseous muscles, moderate fat loss at orbitals and thoracic region, 16 1% wt loss x 8 mo (10/1/19 143#, 6/27/20 120#))  Weight gain of 8 lb since admisssion  BMI Findings:  BMI Classifications: Underweight < 18 5     Body mass index is 17 94 kg/m²

## 2020-07-08 NOTE — ASSESSMENT & PLAN NOTE
Secondary to multiple factors  Continue nutritional supplement  Eating weight gain to 125 lb   Follow PT OT, speech evaluation- final disposition to nursing home

## 2020-07-08 NOTE — ASSESSMENT & PLAN NOTE
· I cannot find any records revealing that this is a chronic thrombus as discussed with Cardiology possibly had in the past   Had a 2D echo he does have an LV thrombus yesterday recommendation for at least for the thrombus 3 months anticoagulation but he does have atrial fibrillation  · Continue Coumadin 7 5 mg along with Lovenox    INR is trending up, today INR is 1 90-will recheck INR tomorrow

## 2020-07-08 NOTE — ASSESSMENT & PLAN NOTE
Secondary to LV thrombus he is currently on Lovenox to Coumadin bridge INR should be between 2-3 discussed with Cardiology as well  INR still subtherapeutic will increase Coumadin to 7 5 mg po x1(7/6) daily started 7/2 with heparin bridge  INR 2-3  Continue metoprolol  on Lovenox bridge as he is hard stick all in bruises from multiple blood draws    INR is 1 90-will continue Coumadin 7 5 mg , as INR is trending up-will recheck INR tomorrow

## 2020-07-08 NOTE — WOUND OSTOMY CARE
Progress Note - Wound   Nayeli Moralez 68 y o  male MRN: [id number]  Unit/Bed#: -01 Encounter: [id number]      Assessment:   Wound care follow up for patient with a POA stage 1 pressure injury  Patient was in bed for assessment  PCA assisted with turning patient for assessment  Spoke with his primary nurse  1  Sacrum pink and blanchable  2  Bilateral heels intact, left heel blanchable erythema    Plan:   1  Allevyn Life silicone bordered foam to the sacrum, uyen with P, date, peel back daily for skin assessment, reapply, change every 3 days or PRN with soilage or dislodgement  2  Continue with current skin care orders    Wound 06/27/20 Pressure Injury Sacrum (Active)   Wound Image   7/8/2020 12:34 PM   Wound Description Pink; Intact; Clean 7/8/2020 12:34 PM   Staging Stage I 7/8/2020 12:34 PM   [place]-wound Assessment Clean;Dry; Intact 7/8/2020 12:34 PM   Wound Length (cm) 1 cm 6/27/2020  5:38 PM   Wound Width (cm) 1 cm 6/27/2020  5:38 PM   Calculated Wound Area (cm^2) 1 cm^2 6/27/2020  5:38 PM   Drainage Amount None 7/7/2020  9:15 AM   Treatments Site care 7/6/2020  8:34 AM   Dressing Foam, Silicon (eg  Allevyn, etc) 7/8/2020 12:34 PM   Wound packed? No 7/7/2020  9:15 AM   Patient Tolerance Tolerated well 7/8/2020 12:34 PM   Dressing Status Clean;Dry; Intact 7/8/2020  9:16 AM     Reviewed plan of care with primary RN   Recommendations written as orders  Wound care to follow weekly  Questions or concerns Marito WILLOUGHBYN, RN

## 2020-07-08 NOTE — PHYSICAL THERAPY NOTE
Physical Therapy Cancellation Note    Attempted to see pt for PT treatment at 8:13   Pt refused amb & ex attempts stating "I can't walk & I don't want to ex or sit up "    Diomedes Smith, PTA

## 2020-07-08 NOTE — ASSESSMENT & PLAN NOTE
· TSH is normal  · Continue nutritional supplement  · Continue diet  · Speech and swallow, nutritional consult appreciated  · Patient's poor p o  Intake with feeling of dysphagia suspect secondary to increased anxiety as no physical findings   Reviewed input done by Psychiatry  Will make some adjustments a but he did gain weight since he has been here upto 125 lb on admission was 120 lb   · Hospital team did have a detailed discussion with his primary care physician Dr Lexy Bernal- in agreement that this is psychological   Patient is unable to return his assisted living as they are not able to accommodate his diet patient does need a 24 hour supervision and speech continuation working  He has been eating 50%-75% of the meals and his meds are being crushed without any issue although he at times does still complain certain things  Discussed with son in detail and they are in agreement that patient will go to NH on discharge    · Sun down   · Continue strict I's and O's and daily weights  · Mirtazapine dose increased to 30 mg

## 2020-07-08 NOTE — PLAN OF CARE
Problem: Potential for Falls  Goal: Patient will remain free of falls  Description  INTERVENTIONS:  - Assess patient frequently for physical needs  -  Identify cognitive and physical deficits and behaviors that affect risk of falls  -  [place] fall precautions as indicated by assessment   - Educate patient/family on patient safety including physical limitations  - Instruct patient to call for assistance with activity based on assessment  - Modify environment to reduce risk of injury  - Consider OT/PT consult to assist with strengthening/mobility  Outcome: Progressing     Problem: Prexisting or High Potential for Compromised Skin Integrity  Goal: Skin integrity is maintained or improved  Description  INTERVENTIONS:  - Identify patients at risk for skin breakdown  - Assess and monitor skin integrity  - Assess and monitor nutrition and hydration status  - Monitor labs   - Assess for incontinence   - Turn and reposition patient  - Assist with mobility/ambulation  - Relieve pressure over bony prominences  - Avoid friction and shearing  - Provide appropriate hygiene as needed including keeping skin clean and dry  - Evaluate need for skin moisturizer/barrier cream  - Collaborate with interdisciplinary team   - Patient/family teaching  - Consider wound care consult   Outcome: Progressing     Problem: Nutrition/Hydration-ADULT  Goal: Nutrient/Hydration intake appropriate for improving, restoring or maintaining nutritional needs  Description  Monitor and assess patient's nutrition/hydration status for malnutrition  Collaborate with interdisciplinary team and initiate plan and interventions as ordered  Monitor patient's weight and dietary intake as ordered or per policy  Utilize nutrition screening tool and intervene as necessary  Determine patient's food preferences and provide high-protein, high-caloric foods as appropriate       INTERVENTIONS:  - Monitor oral intake, urinary output, labs, and treatment plans  - Assess nutrition and hydration status and recommend course of action  - Evaluate amount of meals eaten  - Assist patient with eating if necessary   - Allow adequate time for meals  - Recommend/ encourage appropriate diets, oral nutritional supplements, and vitamin/mineral supplements  - Order, calculate, and assess calorie counts as needed  - Recommend, monitor, and adjust tube feedings and TPN/PPN based on assessed needs  - Assess need for intravenous fluids  - Provide specific nutrition/hydration education as appropriate  - Include patient/family/caregiver in decisions related to nutrition  Outcome: Progressing

## 2020-07-08 NOTE — CASE MANAGEMENT
Call received from [facility] and they reviewed the information I forward them yesterday and they feel patient does not trigger for a level 2 either, verbally as nothing has changed since Oct 2019  CM spoke to [facility] and they can accept patient  Anticipate readiness tomorrow  7/9    Initial Covid was negative done yesterday  Will have 2nd test done tomorrow AM     CM went in and spoke to patient and reviewed the acceptance  CM reviewed the dc IMM with patient and he declined signing it as he said his POA will take care of this  CM called and spoke to Mrs Marlene Chase as her spouse was at work  She is going to review the dc plans with her spouse and have Mr Marlene Boon call me and review the DC IMM  Discussed with family that transportation via [facility] is not covered by insurance and patient will be billed for this service  Rubina the DIL, verbalized understanding  Plan is [place] in the AM       1045: Call received back from [facility] LEJEUNE and reviewed the dc IMM with him, verbalized understanding  He is in agreement to [facility]  He will be visiting his father today and encouraging and re explaining the needs and expectations going forward

## 2020-07-09 VITALS
WEIGHT: 124.78 LBS | HEART RATE: 75 BPM | HEIGHT: 70 IN | SYSTOLIC BLOOD PRESSURE: 95 MMHG | DIASTOLIC BLOOD PRESSURE: 52 MMHG | OXYGEN SATURATION: 99 % | BODY MASS INDEX: 17.86 KG/M2 | TEMPERATURE: 97.9 F | RESPIRATION RATE: 16 BRPM

## 2020-07-09 PROBLEM — R58 ECCHYMOSIS: Status: ACTIVE | Noted: 2020-07-09

## 2020-07-09 PROBLEM — D61.818 PANCYTOPENIA (HCC): Status: ACTIVE | Noted: 2020-07-09

## 2020-07-09 PROBLEM — L89.301 PRESSURE INJURY OF BUTTOCK, STAGE 1: Status: ACTIVE | Noted: 2020-07-09

## 2020-07-09 LAB
ALBUMIN SERPL BCP-MCNC: 3.1 G/DL (ref 3.5–5)
ALP SERPL-CCNC: 68 U/L (ref 46–116)
ALT SERPL W P-5'-P-CCNC: 16 U/L (ref 12–78)
ANION GAP SERPL CALCULATED.3IONS-SCNC: 5 MMOL/L (ref 4–13)
AST SERPL W P-5'-P-CCNC: 16 U/L (ref 5–45)
BASOPHILS # BLD AUTO: 0.01 THOUSANDS/ΜL (ref 0–0.1)
BASOPHILS NFR BLD AUTO: 0 % (ref 0–1)
BILIRUB SERPL-MCNC: 0.17 MG/DL (ref 0.2–1)
BUN SERPL-MCNC: 37 MG/DL (ref 5–25)
CALCIUM SERPL-MCNC: 8.8 MG/DL (ref 8.3–10.1)
CHLORIDE SERPL-SCNC: 108 MMOL/L (ref 100–108)
CO2 SERPL-SCNC: 29 MMOL/L (ref 21–32)
CREAT SERPL-MCNC: 1.09 MG/DL (ref 0.6–1.3)
EOSINOPHIL # BLD AUTO: 0.04 THOUSAND/ΜL (ref 0–0.61)
EOSINOPHIL NFR BLD AUTO: 1 % (ref 0–6)
ERYTHROCYTE [DISTWIDTH] IN BLOOD BY AUTOMATED COUNT: 15.9 % (ref 11.6–15.1)
GFR SERPL CREATININE-BSD FRML MDRD: 65 ML/MIN/1.73SQ M
GLUCOSE SERPL-MCNC: 91 MG/DL (ref 65–140)
HCT VFR BLD AUTO: 30.8 % (ref 36.5–49.3)
HGB BLD-MCNC: 10.1 G/DL (ref 12–17)
IMM GRANULOCYTES # BLD AUTO: 0.02 THOUSAND/UL (ref 0–0.2)
IMM GRANULOCYTES NFR BLD AUTO: 1 % (ref 0–2)
INR PPP: 1.51 (ref 0.84–1.19)
LYMPHOCYTES # BLD AUTO: 1.02 THOUSANDS/ΜL (ref 0.6–4.47)
LYMPHOCYTES NFR BLD AUTO: 24 % (ref 14–44)
MCH RBC QN AUTO: 32.4 PG (ref 26.8–34.3)
MCHC RBC AUTO-ENTMCNC: 32.8 G/DL (ref 31.4–37.4)
MCV RBC AUTO: 99 FL (ref 82–98)
MONOCYTES # BLD AUTO: 0.92 THOUSAND/ΜL (ref 0.17–1.22)
MONOCYTES NFR BLD AUTO: 22 % (ref 4–12)
NEUTROPHILS # BLD AUTO: 2.2 THOUSANDS/ΜL (ref 1.85–7.62)
NEUTS SEG NFR BLD AUTO: 52 % (ref 43–75)
NRBC BLD AUTO-RTO: 0 /100 WBCS
PLATELET # BLD AUTO: 111 THOUSANDS/UL (ref 149–390)
PMV BLD AUTO: 8.9 FL (ref 8.9–12.7)
POTASSIUM SERPL-SCNC: 4.1 MMOL/L (ref 3.5–5.3)
PROT SERPL-MCNC: 6.1 G/DL (ref 6.4–8.2)
PROTHROMBIN TIME: 18.3 SECONDS (ref 11.6–14.5)
RBC # BLD AUTO: 3.12 MILLION/UL (ref 3.88–5.62)
SARS-COV-2 RNA RESP QL NAA+PROBE: NEGATIVE
SODIUM SERPL-SCNC: 142 MMOL/L (ref 136–145)
WBC # BLD AUTO: 4.21 THOUSAND/UL (ref 4.31–10.16)

## 2020-07-09 PROCEDURE — 80053 COMPREHEN METABOLIC PANEL: CPT | Performed by: FAMILY MEDICINE

## 2020-07-09 PROCEDURE — 85025 COMPLETE CBC W/AUTO DIFF WBC: CPT | Performed by: FAMILY MEDICINE

## 2020-07-09 PROCEDURE — 99239 HOSP IP/OBS DSCHRG MGMT >30: CPT | Performed by: FAMILY MEDICINE

## 2020-07-09 PROCEDURE — 87635 SARS-COV-2 COVID-19 AMP PRB: CPT | Performed by: FAMILY MEDICINE

## 2020-07-09 PROCEDURE — 85610 PROTHROMBIN TIME: CPT | Performed by: FAMILY MEDICINE

## 2020-07-09 RX ORDER — MIRTAZAPINE 30 MG/1
30 TABLET, FILM COATED ORAL
Qty: 30 TABLET | Refills: 0 | Status: SHIPPED | OUTPATIENT
Start: 2020-07-09 | End: 2020-09-25 | Stop reason: HOSPADM

## 2020-07-09 RX ORDER — WARFARIN SODIUM 7.5 MG/1
TABLET ORAL
Qty: 10 TABLET | Refills: 0 | Status: SHIPPED | OUTPATIENT
Start: 2020-07-09 | End: 2020-09-25 | Stop reason: HOSPADM

## 2020-07-09 RX ORDER — WARFARIN SODIUM 7.5 MG/1
7.5 TABLET ORAL
Status: DISCONTINUED | OUTPATIENT
Start: 2020-07-09 | End: 2020-07-09 | Stop reason: HOSPADM

## 2020-07-09 RX ORDER — DOCUSATE SODIUM 100 MG/1
100 CAPSULE, LIQUID FILLED ORAL 2 TIMES DAILY
Qty: 10 CAPSULE | Refills: 0 | Status: SHIPPED | OUTPATIENT
Start: 2020-07-09 | End: 2020-09-25 | Stop reason: HOSPADM

## 2020-07-09 RX ORDER — ALPRAZOLAM 0.25 MG/1
0.25 TABLET ORAL 3 TIMES DAILY PRN
Qty: 30 TABLET | Refills: 0 | Status: SHIPPED | OUTPATIENT
Start: 2020-07-09 | End: 2020-09-25 | Stop reason: HOSPADM

## 2020-07-09 RX ADMIN — CYANOCOBALAMIN TAB 500 MCG 1000 MCG: 500 TAB at 10:00

## 2020-07-09 RX ADMIN — SUCRALFATE 1 G: 1 TABLET ORAL at 10:00

## 2020-07-09 RX ADMIN — PANTOPRAZOLE SODIUM 40 MG: 40 TABLET, DELAYED RELEASE ORAL at 10:00

## 2020-07-09 RX ADMIN — ATORVASTATIN CALCIUM 10 MG: 10 TABLET, FILM COATED ORAL at 10:00

## 2020-07-09 RX ADMIN — SERTRALINE HYDROCHLORIDE 25 MG: 25 TABLET ORAL at 10:00

## 2020-07-09 RX ADMIN — LEVOTHYROXINE SODIUM 50 MCG: 50 TABLET ORAL at 10:00

## 2020-07-09 RX ADMIN — DOCUSATE SODIUM 100 MG: 100 CAPSULE, LIQUID FILLED ORAL at 10:00

## 2020-07-09 RX ADMIN — ENOXAPARIN SODIUM 60 MG: 60 INJECTION SUBCUTANEOUS at 10:00

## 2020-07-09 RX ADMIN — POTASSIUM CHLORIDE 20 MEQ: 20 SOLUTION ORAL at 10:00

## 2020-07-09 RX ADMIN — ALPRAZOLAM 0.25 MG: 0.25 TABLET ORAL at 08:05

## 2020-07-09 RX ADMIN — ASPIRIN 81 MG: 81 TABLET, COATED ORAL at 10:00

## 2020-07-09 NOTE — NURSING NOTE
Reviewed discharge instructions, medications with Hector Mariee RN at [facility], advised patient to take both  coumadin and lovenox until INR 2-3, then discontinue lovenox, verbally understood ( INR today 1 51)

## 2020-07-09 NOTE — ASSESSMENT & PLAN NOTE
multifactorial  Patient will benefited with follow-up with Hematology/Oncology on outpatient basis as well as workup  Patient is on Lovenox and Coumadin-for Bridging Purpose-monitor for any bleeding disorder

## 2020-07-09 NOTE — ASSESSMENT & PLAN NOTE
· Resolved-  · Ecchymosis noted on right upper arm close to elbow-patient was receiving heparin drip as well as Coumadin which change to Lovenox with Coumadin  · -keep monitoring

## 2020-07-09 NOTE — PLAN OF CARE
Problem: Potential for Falls  Goal: Patient will remain free of falls  Description  INTERVENTIONS:  - Assess patient frequently for physical needs  -  Identify cognitive and physical deficits and behaviors that affect risk of falls  -  [place] fall precautions as indicated by assessment   - Educate patient/family on patient safety including physical limitations  - Instruct patient to call for assistance with activity based on assessment  - Modify environment to reduce risk of injury  - Consider OT/PT consult to assist with strengthening/mobility  Outcome: Progressing     Problem: Prexisting or High Potential for Compromised Skin Integrity  Goal: Skin integrity is maintained or improved  Description  INTERVENTIONS:  - Identify patients at risk for skin breakdown  - Assess and monitor skin integrity  - Assess and monitor nutrition and hydration status  - Monitor labs   - Assess for incontinence   - Turn and reposition patient  - Assist with mobility/ambulation  - Relieve pressure over bony prominences  - Avoid friction and shearing  - Provide appropriate hygiene as needed including keeping skin clean and dry  - Evaluate need for skin moisturizer/barrier cream  - Collaborate with interdisciplinary team   - Patient/family teaching  - Consider wound care consult   Outcome: Progressing     Problem: Nutrition/Hydration-ADULT  Goal: Nutrient/Hydration intake appropriate for improving, restoring or maintaining nutritional needs  Description  Monitor and assess patient's nutrition/hydration status for malnutrition  Collaborate with interdisciplinary team and initiate plan and interventions as ordered  Monitor patient's weight and dietary intake as ordered or per policy  Utilize nutrition screening tool and intervene as necessary  Determine patient's food preferences and provide high-protein, high-caloric foods as appropriate       INTERVENTIONS:  - Monitor oral intake, urinary output, labs, and treatment plans  - Assess nutrition and hydration status and recommend course of action  - Evaluate amount of meals eaten  - Assist patient with eating if necessary   - Allow adequate time for meals  - Recommend/ encourage appropriate diets, oral nutritional supplements, and vitamin/mineral supplements  - Order, calculate, and assess calorie counts as needed  - Recommend, monitor, and adjust tube feedings and TPN/PPN based on assessed needs  - Assess need for intravenous fluids  - Provide specific nutrition/hydration education as appropriate  - Include patient/family/caregiver in decisions related to nutrition  Outcome: Progressing

## 2020-07-09 NOTE — ASSESSMENT & PLAN NOTE
Severe in nature  Continue current treatment and management  May benefit from outpatient behavioral therapy

## 2020-07-09 NOTE — ASSESSMENT & PLAN NOTE
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

## 2020-07-09 NOTE — CASE MANAGEMENT
Discussed in AM huddle:  Probably dc today: Need to follow up with [facility] if they can continue the Lovenox/ CMD bridge  Need 2nd COVID test   Spoke to [facility] who spoke to Med Director and they can do the Coumadin/Lovenox bridge there  Plan dc about 1 PM   CM spoke to Daughter in law about plan of care, Mac's son was in yesterday and in full agreement of plan of care  Discussed with DIL and son yesterday that transportation via [employer] is not covered by insurance and patient will be billed for this service  Verbalized understanding  Information for report placed in Navigator:  Await Covid and will set up [id number] transport  [id number]: Called [facility] and spoke to [facility] to schedule transport time for 1:00-1:30  Have updated patient on plan of care

## 2020-07-09 NOTE — ASSESSMENT & PLAN NOTE
· I cannot find any records revealing that this is a chronic thrombus as discussed with Cardiology possibly had in the past   Had a 2D echo he does have an LV thrombus yesterday recommendation for at least for the thrombus 3 months anticoagulation but he does have atrial fibrillation  · Patient was on Coumadin, INR was subtherapeutic  Initially heparin drip started to breach with Coumadin  Later on heparin discontinued, Lovenox started to [place]    · Continue Lovenox and Coumadin, once INR is 2, discontinue Lovenox and continue Coumadin only

## 2020-07-09 NOTE — ASSESSMENT & PLAN NOTE
Appreciate wound care recommendation  The recommendations are as below:  Skin care plans:  1-Hydraguard to bilateral sacrum, buttock and heels BID and PRN  2-Elevate heels to offload pressure  3-Ehob cushion in chair when out of bed  4-Moisturize skin daily with skin nourishing cream   5-Turn/reposition q2h or when medically stable for pressure re-distribution on skin

## 2020-07-09 NOTE — PLAN OF CARE
Problem: Potential for Falls  Goal: Patient will remain free of falls  Description  INTERVENTIONS:  - Assess patient frequently for physical needs  -  Identify cognitive and physical deficits and behaviors that affect risk of falls  -  [place] fall precautions as indicated by assessment   - Educate patient/family on patient safety including physical limitations  - Instruct patient to call for assistance with activity based on assessment  - Modify environment to reduce risk of injury  - Consider OT/PT consult to assist with strengthening/mobility  Outcome: Adequate for Discharge     Problem: Prexisting or High Potential for Compromised Skin Integrity  Goal: Skin integrity is maintained or improved  Description  INTERVENTIONS:  - Identify patients at risk for skin breakdown  - Assess and monitor skin integrity  - Assess and monitor nutrition and hydration status  - Monitor labs   - Assess for incontinence   - Turn and reposition patient  - Assist with mobility/ambulation  - Relieve pressure over bony prominences  - Avoid friction and shearing  - Provide appropriate hygiene as needed including keeping skin clean and dry  - Evaluate need for skin moisturizer/barrier cream  - Collaborate with interdisciplinary team   - Patient/family teaching  - Consider wound care consult   Outcome: Adequate for Discharge     Problem: Nutrition/Hydration-ADULT  Goal: Nutrient/Hydration intake appropriate for improving, restoring or maintaining nutritional needs  Description  Monitor and assess patient's nutrition/hydration status for malnutrition  Collaborate with interdisciplinary team and initiate plan and interventions as ordered  Monitor patient's weight and dietary intake as ordered or per policy  Utilize nutrition screening tool and intervene as necessary  Determine patient's food preferences and provide high-protein, high-caloric foods as appropriate       INTERVENTIONS:  - Monitor oral intake, urinary output, labs, and treatment plans  - Assess nutrition and hydration status and recommend course of action  - Evaluate amount of meals eaten  - Assist patient with eating if necessary   - Allow adequate time for meals  - Recommend/ encourage appropriate diets, oral nutritional supplements, and vitamin/mineral supplements  - Order, calculate, and assess calorie counts as needed  - Recommend, monitor, and adjust tube feedings and TPN/PPN based on assessed needs  - Assess need for intravenous fluids  - Provide specific nutrition/hydration education as appropriate  - Include patient/family/caregiver in decisions related to nutrition  Outcome: Adequate for Discharge

## 2020-07-09 NOTE — DISCHARGE SUMMARY
Discharge- Rinda Cancel 1940, 68 y o  male MRN: [id number]    Unit/Bed#: -01 Encounter: [id number]    Primary Care Provider: Acacia Clark MD   Date and time admitted to hospital: 6/27/2020 12:53 PM        Left ventricular apical thrombus  Assessment & Plan  · I cannot find any records revealing that this is a chronic thrombus as discussed with Cardiology possibly had in the past   Had a 2D echo he does have an LV thrombus yesterday recommendation for at least for the thrombus 3 months anticoagulation but he does have atrial fibrillation  · Patient was on Coumadin, INR was subtherapeutic  Initially heparin drip started to breach with Coumadin  Later on heparin discontinued, Lovenox started to [place]  · Continue Lovenox and Coumadin, once INR is 2, discontinue Lovenox and continue Coumadin only    Permanent atrial fibrillation ([facility] [address] )  Assessment & Plan    Secondary to LV thrombus he is currently on Lovenox to Coumadin bridge INR should be between 2-3 discussed with Cardiology as well  INR still subtherapeutic will increase Coumadin to 7 5 mg po x1(7/6) daily started 7/2 with heparin bridge  Goal INR 2-3  Continue metoprolol      * Oropharyngeal dysphagia  Assessment & Plan  · I suspect secondary to his increased anxiety as he is scared to eat but there is no evidence of physical hindrance  of dysphagia  Also he does not have any teeth so he will continue modified diet puree  I did ask Psychiatry to evaluate the patient as well  Psychiatry evaluated patient  I will not increase his nighttime Remeron secondary to some hypotension before and to avoid oversedation   But I add Xanax instead of p r n  Anxiety is p r n  30 minutes prior to food as he does feel anxious to eat  But he has been gaining weight while he is here continue pureed diet he is also does not have any dentures      · GI, speech and swallow recommendation appreciated-s/p video swallow evaluation done  As per GI note, patient does not want EGD I discussed with patient again if he wants to have the EGD but he kind of went around the [place]  · S/P esophagogram on 11/19:Limited examination revealing no gross hypopharyngeal or esophageal  abnormalities  He had another 1 today did not show any physical abnormalities  · See above management  · Actually had a discussion with the patient regarding the PEG tube as his PCP recommended as well patient declined  · Dietary consult appreciated  · As per is speech and swallow recommendations are as below:  Recommended Diet:  puree/level 1 diet and thin liquids   Recommended Form of Medications: crushed in puree   Aspiration precautions and compensatory swallowing strategies: upright posture, effortful swallow and alternating bites and sips, double swallow  Consider referral to: Pt was seen for a barium swallow as well, see report  SLP Dysphagia therapy recommended: ST will f/u briefly for patient education and strategy use to decrease fear of PO intake      Ecchymosis  Assessment & Plan  Located on the right arm close to elbow  Most likely secondary to IV line as well as patient is on Lovenox and Coumadin  Remained stable  Continue to monitor    Pancytopenia ([facility] [address] )  Assessment & Plan  multifactorial  Patient will benefited with follow-up with Hematology/Oncology on outpatient basis as well as workup  Patient is on Lovenox and Coumadin-for Bridging Purpose-monitor for any bleeding disorder    Pressure injury of buttock, stage 1  Assessment & Plan  Appreciate wound care recommendation  The recommendations are as below:  Skin care plans:  1-Hydraguard to bilateral sacrum, buttock and heels BID and PRN  2-Elevate heels to offload pressure  3-Ehob cushion in chair when out of bed  4-Moisturize skin daily with skin nourishing cream   5-Turn/reposition q2h or when medically stable for pressure re-distribution on skin       Depressive disorder  Assessment & Plan  Appreciate psychiatric recommendation  Continue current treatment and management      Vitamin B12 deficiency  Assessment & Plan  ·  vitamin B12 1000 my g p o  Daily    Chronic idiopathic thrombocytopenia (HCC)  Assessment & Plan  · Platelet is trending up  ·   Vitamin B12 is deficient ,start p o  Treatment  Localized swelling of right upper extremity  Assessment & Plan  · Resolved-  · Ecchymosis noted on right upper arm close to elbow-patient was receiving heparin drip as well as Coumadin which change to Lovenox with Coumadin  · -keep monitoring    Severe protein-calorie malnutrition (HCC)  Assessment & Plan  Malnutrition Findings:   Malnutrition type: Chronic illness  Degree of Malnutrition: Other severe protein calorie malnutrition(related to oropharyngeal dysphagia as evidenced by < 75% energy intake > 1 mo, severe muscle wasting noted at pectoralis, deltoid, interosseous muscles, moderate fat loss at orbitals and thoracic region, 16 1% wt loss x 8 mo (10/1/19 143#, 6/27/20 120#))  Weight gain of 8 lb since admisssion  BMI Findings:  BMI Classifications: Underweight < 18 5     Body mass index is 17 9 kg/m²  Mixed obsessional thoughts and acts  Assessment & Plan  Severe in nature  Continue current treatment and management  May benefit from outpatient behavioral therapy    Physical deconditioning  Assessment & Plan  - will be discharged to nursing home anticipate early in the week    Chronic combined systolic and diastolic congestive heart failure ([facility] [address] )  Assessment & Plan  · 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   As reviewed per records  · He is compensated  He is actually failure to thrive no need for any diuretics  He is EF is 35-40% with grade 3 diastolic dysfunction on the echocardiogram    · Initially lisinopril was lowered to 5 mg daily secondary to low blood pressure in a blood pressure stable continue metoprolol as well      Bipolar 1 disorder, depressed ([address] )  Assessment & Plan  Continue home medication  Psychiatric evaluation evaluate appreciated      Type 2 diabetes mellitus with diabetic neuropathy, without long-term current use of insulin ([facility] [address] )  Assessment & Plan  No results found for: HGBA1C    No results for input(s): POCGLU in the last 72 hours  Blood Sugar Average: Last 72 hrs:   sliding scale  Continue gabapentin  Controlled    Urinary retention  Assessment & Plan  History of urinary retention  Follow urinary retention protocol  No issues    Anxiety  Assessment & Plan  Continue home medication  Uncontrolled that is causing him not To eat much as he is scared to eat  Consulted Psychiatry to see if any medication needs to be adjusted  Reviewed recommendation all actually place Xanax 30 minutes prior to each meal to see if that helps  Also Remeron increased to 30 mg as per psychiatric recommendation to increase appetite    Generalized weakness  Assessment & Plan  Secondary to multiple factors  Continue nutritional supplement  PT OT, speech evaluation- final disposition to nursing home      Failure to thrive in adult  Assessment & Plan  · TSH is normal  · Continue nutritional supplement  · Continue diet  · Speech and swallow, nutritional consult appreciated  · Patient's poor p o  Intake with feeling of dysphagia suspect secondary to increased anxiety as no physical findings   Reviewed input done by Psychiatry  · Hospital team did have a detailed discussion with his primary care physician Dr Pascual Macias- in agreement that this is psychological   Patient is unable to return his assisted living as they are not able to accommodate his diet patient does need a 24 hour supervision and speech continuation working  He has been eating 50%-75% of the meals and his meds are being crushed without any issue although he at times does still complain certain things  Discussed with son in detail and they are in agreement that patient will go to NH on discharge    · Sun down   · Continue strict I's and O's and daily weights  · Mirtazapine dose increased to 30 mg      Discharging Physician / Practitioner: Shayla Laboy MD  PCP: Rickey Panda MD  Admission Date:   Admission Orders (From admission, onward)     Ordered        06/27/20 1552  Inpatient Admission (expected length of stay for this patient Order details is greater than two midnights)  Once                   Discharge Date: 07/09/20    Resolved Problems  Date Reviewed: 7/6/2020          Resolved    Protein-calorie malnutrition, severe ([address] ) 6/27/2020     Resolved by  Shayla Laboy MD          Consultations During Hospital Stay:  · Gastroenterology  · Behavioral therapy  · Cardiology  · Speech and swallow  · Nutritionist  · Physical therapy/occupational therapy    Procedures Performed:   · Esophagogram:Limited study  No evidence of aspiration  There is no delay in emptying of the thoracic esophagus  No evidence of reflux during the course examination  · Chest x-ray:  No acute cardiopulmonary disease  Echocardiogram:LEFT VENTRICLE:  There is a large size, 1x1 cm, apical LV thrombus  There is large size anterior, inferior, lateral and septal apical wall motion abnormality with dyskinesis of the LV segments  Size was at the upper limits of normal   Ejection fraction was estimated in the range of 35 % to 40 %  There was mild diffuse hypokinesis with regional variations  There was dyskinesis of the apical wall(s)  Doppler parameters were consistent with a reversible restrictive pattern, indicative of decreased left ventricular diastolic compliance and/or increased left atrial pressure (grade 3 diastolic dysfunction)      RIGHT VENTRICLE:  There is a pacemaker wire in the RV  The size was normal   Systolic function was normal      MITRAL VALVE:  There was mild regurgitation      TRICUSPID VALVE:  · There was mild regurgitation      Significant Findings / Test Results:   Lab Results   Component Value Date    WBC 4 21 (L) 07/09/2020    HGB 10 1 (L) 07/09/2020    HCT 30 8 (L) 07/09/2020    MCV 99 (H) 07/09/2020     (L) 07/09/2020   ·   Lab Results   Component Value Date    SODIUM 142 07/09/2020    K 4 1 07/09/2020     07/09/2020    CO2 29 07/09/2020    AGAP 5 07/09/2020    BUN 37 (H) 07/09/2020    CREATININE 1 09 07/09/2020    GLUC 91 07/09/2020    CALCIUM 8 8 07/09/2020    AST 16 07/09/2020    ALT 16 07/09/2020    ALKPHOS 68 07/09/2020    TP 6 1 (L) 07/09/2020    TBILI 0 17 (L) 07/09/2020    EGFR 65 07/09/2020   ·   Procedure Component Value - Date/Time   Novel Coron[facility]Covid-19),[facility] [[id number]] (Normal) Collected: 07/09/20 1044   Lab Status: Final result Specimen: Nares from Nasopharyngeal Swab Updated: 07/09/20 1139    SARS-CoV-2 Negative   Narrative:     The specimen collection materials, transport medium, and/or testing methodology utilized in the production of these test results have been proven to be reliable in a limited validation with an abbreviated program under the Emergency Utilization Authorization provided by the [facility] 6 reported as "Presumptive positive" will be confirmed with secondary testing with a reference laboratory to ensure result accuracy   Clinical caution and judgement should be used with the interpretation of these results with consideration of the clinical impression and other laboratory testing   Testing reported as "Positive" or "Negative" has been proven to be accurate according to standard laboratory validation requirements   All testing is performed with control materials showing appropriate reactivity at standard intervals     Novel Coronavirus [facility] [[id number]] (Normal) Collected: 07/07/20 1738   Lab Status: Final result Specimen: Nares from Nose Updated: 07/07/20 1922    SARS-CoV-2 Negative   Narrative:     The specimen collection materials, transport medium, and/or testing methodology utilized in the production of these test results have been proven to be reliable in a limited validation with an abbreviated program under the Emergency Utilization Authorization provided by the [facility] 6 reported as "Presumptive positive" will be confirmed with secondary testing with a reference laboratory to ensure result accuracy   Clinical caution and judgement should be used with the interpretation of these results with consideration of the clinical impression and other laboratory testing   Testing reported as "Positive" or "Negative" has been proven to be accurate according to standard laboratory validation requirements   All testing is performed with control materials showing appropriate reactivity at standard intervals  MRSA culture [[id number]] (Normal) Collected: 06/27/20 1826   Lab Status: Final result Specimen: Nares from Nose Updated: 06/29/20 1258    MRSA Culture Only No Methicillin Resistant Staphlyococcus aureus (MRSA) isolated     ·     Incidental Findings:   · Apical thrombus in left ventricle     Test Results Pending at Discharge (will require follow up):   · none     Outpatient Tests Requested:  · Patient is to monitor for CBC, CMP, INR    Complications:  None    Reason for Admission:  Dysphagia, failure to thrive in adult, generalized weakness    Hospital Course:     Verna Alexandra is a 68 y o  male patient who originally presented to the hospital on 6/27/2020 due to dysphagia, failure to thrive in adult, generalized weakness  Patient with medical history of permanent atrial fibrillation on Coumadin, status post pacemaker in place, anxiety disorder, urinary retention, type 2 diabetes, bipolar type 1 disorder, depressed, chronic combined systolic and diastolic congestive heart failure, obsessive-compulsive disorder-came to hospital from nursing home with a concern of generalized weakness, dysphagia  During the hospitalization, patient got treatment for oropharyngeal dysphagia, CHF, permanent AFib, urinary retention      For dysphagia, patient was evaluated by gastroenterologist and offered to do EGD  Patient refused to go through the anesthesia procedure  It was thought that patient's dysphagia is related to poor dentition as well as severe anxiety  Patient also evaluated nutritionist, speech and swallow department and provided the recommendations as below:  Recommended Diet:  puree/level 1 diet and thin liquids   Recommended Form of Medications: crushed in puree   Aspiration precautions and compensatory swallowing strategies: upright posture, effortful swallow and alternating bites and sips, double swallow  Consider referral to: Pt was seen for a barium swallow as well, see report  SLP Dysphagia therapy recommended: ST will f/u briefly for patient education and strategy use to decrease fear of PO intake    Considering patient has severe anxiety, obsessive-compulsive disorder, be overall therapist consulted  Suggested to increase Mirtazapine to 30 mg and added Xanax 3 times, 30 minutes prior to meal     Patient also has been suffering with permanent atrial fibrillation, CHF, status post pacemaker placement  Patient normally follows up with [facility] [place]  During the hospitalization, echocardiogram was done  It was found that patient has left ventricular apical thrombus  Patient already on Coumadin, upon arrival, patient was on subtherapeutic  Coumadin dose try to adjusted, currently patient is taking 7 5 mg daily as well as Lovenox in therapeutic dose 2 times  Target INR range is 2-3  Once INR is 2, please discontinue Lovenox and continue Coumadin alone  Patient evaluated by Cardiology  Currently patient is taking metoprolol for rate control  Ramipril for blood pressure/Diabetes  Patient also has stage on pressure ulcer on the buttock area  Wound care consulted and recommended as below:  Skin care plans:  1-Hydraguard to bilateral sacrum, buttock and heels BID and PRN  2-Elevate heels to offload pressure  3-Ehob cushion in chair when out of bed    4-Moisturize skin daily with skin nourishing cream   5-Turn/reposition q2h or when medically stable for pressure re-distribution on skin  Patient be discharge to skilled nursing facility, at [facility]  Please see above list of diagnoses and related plan for additional information  Condition at Discharge: stable     Discharge Day Visit / Exam:     Subjective:  Seen and evaluated during the round  No overnight issues  Patient is still reports having difficulty with swallowing  Vitals: Blood Pressure: 95/52 (07/09/20 1019)  Pulse: 75 (07/09/20 1019)  Temperature: 97 9 °F (36 6 °C) (07/09/20 0705)  Respirations: 16 (07/09/20 0705)  Height: 5' 10" (177 8 cm) (06/29/20 1126)  Weight - Scale: 56 6 kg (124 lb 12 5 oz) (07/09/20 0559)  SpO2: 99 % (07/09/20 1019)  Exam:   Physical Exam   Constitutional: He is oriented to person, place, and time  He appears cachectic  HENT:   Mouth/Throat: No oropharyngeal exudate  Eyes: Pupils are equal, round, and reactive to light  Conjunctivae and EOM are normal    Neck: Normal range of motion  No JVD present  Cardiovascular: Normal rate  Exam reveals no gallop and no friction rub  Pulmonary/Chest: Effort normal and breath sounds normal  No stridor  No respiratory distress  He has no wheezes  Abdominal: Soft  Bowel sounds are normal    Musculoskeletal: He exhibits no edema  Neurological: He is alert and oriented to person, place, and time  No cranial nerve deficit  Coordination normal    Skin: Skin is warm  Psychiatric: His speech is normal  Judgment normal  His mood appears anxious  He is slowed  Thought content is not paranoid and not delusional  Cognition and memory are normal  He exhibits a depressed mood  He expresses no homicidal and no suicidal ideation  He expresses no suicidal plans and no homicidal plans  Nursing note and vitals reviewed  Discussion with Family:  Yes with son    Discuss about treatment and management plan as well as risk, benefits and side effects  Discharge instructions/Information to patient and family:   See after visit summary for information provided to patient and family  Provisions for Follow-Up Care:  See after visit summary for information related to follow-up care and any pertinent home health orders  Disposition:     [facility] Pozo at [facility] [employer] to [facility] SN[facility]:   · Not Applicable to this Patient - Not Applicable to this Patient    Planned Readmission:  If symptoms get worse     Discharge Statement:  I spent >35 minutes discharging the patient  This time was spent on the day of discharge  I had direct contact with the patient on the day of discharge  Greater than 50% of the total time was spent examining patient, answering all patient questions, arranging and discussing plan of care with patient as well as directly providing post-discharge instructions  Additional time then spent on discharge activities  Discharge Medications:  See after visit summary for reconciled discharge medications provided to patient and family        ** Please Note: This note has been constructed using a voice recognition system **

## 2020-07-09 NOTE — ASSESSMENT & PLAN NOTE
Malnutrition Findings:   Malnutrition type: Chronic illness  Degree of Malnutrition: Other severe protein calorie malnutrition(related to oropharyngeal dysphagia as evidenced by < 75% energy intake > 1 mo, severe muscle wasting noted at pectoralis, deltoid, interosseous muscles, moderate fat loss at orbitals and thoracic region, 16 1% wt loss x 8 mo (10/1/19 143#, 6/27/20 120#))  Weight gain of 8 lb since admisssion  BMI Findings:  BMI Classifications: Underweight < 18 5     Body mass index is 17 9 kg/m²

## 2020-07-09 NOTE — ASSESSMENT & PLAN NOTE
Located on the right arm close to elbow  Most likely secondary to IV line as well as patient is on Lovenox and Coumadin  Remained stable  Continue to monitor

## 2020-07-09 NOTE — COVID-19 HEALTH CARE FACILITY TRANSFER FORM
[facility] to [address] Transfer - COVID-19 Assessment             Name of Patient: Kai Redrock                : 1940          Transport Date: 20       Has the patient been laboratory tested for COVID-19? []  NO  If No,Test was not indicated per  CDC Testing Criteria   May Transfer Patient   [x] YES  If Tested Results below     COVID-19 References              SARS-CoV-2   Date/Time Value Ref Range Status   2020 10:44 AM Negative Negative Final            Question is to be completed for any patient who tests positive for COVID-19        1  [] Yes [May Transfer] [] No [May Not Transfer]          Question is to be completed for any patient who is tested for COVID-19            2    [] Yes [May Not Transfer] [x] No [May Transfer]          Signature of Physician or Health Care Professional: Carlos Veloz MD 20          Form updated as of 3/24/2020

## 2020-07-09 NOTE — ASSESSMENT & PLAN NOTE
· I suspect secondary to his increased anxiety as he is scared to eat but there is no evidence of physical hindrance  of dysphagia  Also he does not have any teeth so he will continue modified diet puree  I did ask Psychiatry to evaluate the patient as well  Psychiatry evaluated patient  I will not increase his nighttime Remeron secondary to some hypotension before and to avoid oversedation   But I add Xanax instead of p r n  Anxiety is p r n  30 minutes prior to food as he does feel anxious to eat  But he has been gaining weight while he is here continue pureed diet he is also does not have any dentures  · GI, speech and swallow recommendation appreciated-s/p video swallow evaluation done  As per GI note, patient does not want EGD I discussed with patient again if he wants to have the EGD but he kind of went around the [place]  · S/P esophagogram on 11/19:Limited examination revealing no gross hypopharyngeal or esophageal  abnormalities  He had another 1 today did not show any physical abnormalities  · See above management  · Actually had a discussion with the patient regarding the PEG tube as his PCP recommended as well patient declined    · Dietary consult appreciated  · As per is speech and swallow recommendations are as below:  Recommended Diet:  puree/level 1 diet and thin liquids   Recommended Form of Medications: crushed in puree   Aspiration precautions and compensatory swallowing strategies: upright posture, effortful swallow and alternating bites and sips, double swallow  Consider referral to: Pt was seen for a barium swallow as well, see report  SLP Dysphagia therapy recommended: ST will f/u briefly for patient education and strategy use to decrease fear of PO intake

## 2020-07-09 NOTE — DISCHARGE INSTRUCTIONS
Heart Failure   WHAT YOU NEED TO KNOW:   Heart failure (HF) is a condition that does not allow your heart to fill or pump properly  Not enough oxygen in your blood gets to your organs and tissues  HF can occur in the right side, the left side, or both lower chambers of your heart  HF is often caused by damage or injury to your heart  The damage may be caused by heart attack, other heart conditions, or high blood pressure  HF is a long-term condition that tends to get worse over time  It is important to manage your health to improve your quality of life  HF can be worsened by heavy alcohol use, smoking, diabetes that is not controlled, or obesity  DISCHARGE INSTRUCTIONS:   Call 911 if:   · You have any of the following signs of a heart attack:      ¨ Squeezing, pressure, or pain in your chest that lasts longer than 5 minutes or returns    ¨ Discomfort or pain in your back, neck, jaw, stomach, or arm     ¨ Trouble breathing    ¨ Nausea or vomiting    ¨ Lightheadedness or a sudden cold sweat, especially with chest pain or trouble breathing    Return to the emergency department if:   · You gain 3 or more pounds (1 4 kg) in a day, or more than your healthcare provider says you should  · Your heartbeat is fast, slow, or uneven all the time  Contact your healthcare provider if:   · You have symptoms of worsening HF:      ¨ Shortness of breath at rest, at night, or that is getting worse in any way     ¨ Weight gain of 5 or more pounds (2 2 kg) in a week     ¨ More swelling in your legs or ankles     ¨ Abdominal pain or swelling     ¨ More coughing     ¨ Loss of appetite     ¨ Feeling tired all the time    · You feel hopeless or depressed, or you have lost interest in things you used to enjoy  · You often feel worried or afraid  · You have questions or concerns about your condition or care  Medicines: You may  need any of the following:  · Medicines  may be needed to help regulate your heart rhythm   You may also need medicine to lower your blood pressure, and to get rid of extra fluids  · Take your medicine as directed  Contact your healthcare provider if you think your medicine is not helping or if you have side effects  Tell him of her if you are allergic to any medicine  Keep a list of the medicines, vitamins, and herbs you take  Include the amounts, and when and why you take them  Bring the list or the pill bottles to follow-up visits  Carry your medicine list with you in case of an emergency  Follow up with your healthcare provider or cardiologist within 2 weeks or as directed: You may need to return for other tests  You may need home health care  A healthcare provider will monitor your vital signs, weight, and make sure your medicines are working  Write down your questions so you remember to ask them during your visits  Go to cardiac rehab as directed:  Cardiac rehab is a program run by specialists who will help you safely strengthen your heart  The program includes exercise, relaxation, stress management, and heart-healthy nutrition  Healthcare providers will also make sure your medicines are helping to reduce your symptoms  Manage HF:   · Do not smoke  Nicotine and other chemicals in cigarettes and cigars can cause lung damage and make HF difficult to manage  Ask your healthcare provider for information if you currently smoke and need help to quit  E-cigarettes or smokeless tobacco still contain nicotine  Talk to your healthcare provider before you use these products  · Do not drink alcohol or take illegal drugs  Alcohol and drugs can worsen your symptoms quickly  · Weigh yourself every morning  Use the same scale, in the same spot  Do this after you use the bathroom, but before you eat or drink anything  Wear the same type of clothing  Do not wear shoes  Record your weight each day so you will notice any sudden weight gain  Swelling and weight gain are signs of fluid retention   If you are overweight, ask how to lose weight safely  · Check your blood pressure and heart rate every day  Ask for more information about how to measure your blood pressure and heart rate correctly  Ask what these numbers should be for you  · Manage any chronic health conditions you have  These include high blood pressure, diabetes, obesity, high cholesterol, metabolic syndrome, and COPD  You will have fewer symptoms if you manage these health conditions  Follow your healthcare provider's recommendations and follow up with him or her regularly  · Eat heart-healthy foods and limit sodium (salt)  An easy way to do this is to eat more fresh fruits and vegetables and fewer canned and processed foods  Replace butter and margarine with heart-healthy oils such as olive oil and canola oil  Other heart-healthy foods include walnuts, whole-grain breads, low-fat dairy products, beans, and lean meats  Fatty fish such as salmon and tuna are also heart healthy  Ask how much salt you can eat each day  Do not use salt substitutes  · Drink liquids as directed  You may need to limit the amount of liquids you drink if you retain fluid  Ask how much liquid to drink each day and which liquids are best for you  · Stay active  If you are not active, your symptoms are likely to worsen quickly  Walking, bicycling, and other types of physical activity help maintain your strength and improve your mood  Physical activity also helps you manage your weight  Work with your healthcare provider to create an exercise plan that is right for you  · Get vaccines as directed  Get a flu shot every year  You may also need the pneumonia vaccine  The flu and pneumonia can be severe for a person who has HF  Vaccines protect you from these infections  Join a support group:  Living with HF can be difficult  It may be helpful to talk with others who have HF   You may learn how to better manage your condition or get emotional support  For more information:   · [address]  [place] , [place]   Phone: 5- [phone] [phone]  Web Address: https://www strong com/  org   © 2017 [address]  Information is for End User's use only and may not be sold, redistributed or otherwise used for commercial purposes  All illustrations and images included in CareNotes® are the copyrighted property of A D A M [employer] Inc  or Guido Rodriguez  The above information is an  only  It is not intended as medical advice for individual conditions or treatments  Talk to your doctor, nurse or pharmacist before following any medical regimen to see if it is safe and effective for you  Anxiety, Ambulatory Care   GENERAL INFORMATION:   Anxiety  is a condition that causes you to feel excessive worry, uneasiness, or fear  Family or work stress, smoking, caffeine, and alcohol can increase your risk for anxiety  Certain medicines or health conditions can also increase your risk  Anxiety may begin gradually and can become a long-term condition if it is not managed or treated  Common symptoms include the following:   · Fatigue or muscle tightness     · Shaking, restlessness, or irritability     · Problems focusing     · Trouble sleeping     · Feeling jumpy, easily startled, or dizzy     · Rapid heartbeat or shortness of breath  Seek immediate care for the following symptoms:   · Chest pain, tightness, or heaviness that may spread to your shoulders, arms, jaw, neck, or back    · Feeling like hurting yourself or someone else    · Dizziness or feeling lightheaded or faint  Treatment for anxiety  may include medicines to help you feel calm and relaxed, and decrease your symptoms  Healthcare providers will treat any medical conditions that may be causing your symptoms  Manage anxiety:   · Go to counseling as directed    Cognitive behavioral therapy can help you understand and change how you react to events that trigger your symptoms  · Find ways to manage your symptoms  Activities such as exercise, meditation, or listening to music can help you relax  · Practice deep breathing  Breathing can change how your body reacts to stress  Focus on taking slow, deep breaths several times a day, or during an anxiety attack  Breathe in through your nose, and out through your mouth  · Avoid caffeine  Caffeine can make your symptoms worse  Avoid foods or drinks that are meant to increase your energy level  · Limit or avoid alcohol  Ask your healthcare provider if alcohol is safe for you  You may not be able to drink alcohol if you take certain anxiety or depression medicines  Limit alcohol to 1 drink per day if you are a woman  Limit alcohol to 2 drinks per day if you are a man  A drink of alcohol is 12 ounces of beer, 5 ounces of wine, or 1½ ounces of liquor  Follow up with your healthcare provider as directed:  Write down your questions so you remember to ask them during your visits  CARE AGREEMENT:   You have the right to help plan your care  Learn about your health condition and how it may be treated  Discuss treatment options with your caregivers to decide what care you want to receive  You always have the right to refuse treatment  The above information is an  only  It is not intended as medical advice for individual conditions or treatments  Talk to your doctor, nurse or pharmacist before following any medical regimen to see if it is safe and effective for you  © 2014 [address] is for End User's use only and may not be sold, redistributed or otherwise used for commercial purposes  All illustrations and images included in CareNotes® are the copyrighted property of [employer]  or Guido Rodriguez

## 2020-07-09 NOTE — ASSESSMENT & PLAN NOTE
Secondary to multiple factors  Continue nutritional supplement  PT OT, speech evaluation- final disposition to nursing home

## 2020-07-09 NOTE — ASSESSMENT & PLAN NOTE
· TSH is normal  · Continue nutritional supplement  · Continue diet  · Speech and swallow, nutritional consult appreciated  · Patient's poor p o  Intake with feeling of dysphagia suspect secondary to increased anxiety as no physical findings   Reviewed input done by Psychiatry  · Hospital team did have a detailed discussion with his primary care physician Dr Koko Vann- in agreement that this is psychological   Patient is unable to return his assisted living as they are not able to accommodate his diet patient does need a 24 hour supervision and speech continuation working  He has been eating 50%-75% of the meals and his meds are being crushed without any issue although he at times does still complain certain things  Discussed with son in detail and they are in agreement that patient will go to NH on discharge    · Sun down   · Continue strict I's and O's and daily weights  · Mirtazapine dose increased to 30 mg

## 2020-09-21 ENCOUNTER — APPOINTMENT (EMERGENCY)
Dept: CT IMAGING | Facility: HOSPITAL | Age: 80
DRG: 682 | End: 2020-09-21
Payer: MEDICARE

## 2020-09-21 ENCOUNTER — HOSPITAL ENCOUNTER (INPATIENT)
Facility: HOSPITAL | Age: 80
LOS: 3 days | Discharge: HOSPICE | DRG: 682 | End: 2020-09-25
Attending: EMERGENCY MEDICINE | Admitting: FAMILY MEDICINE
Payer: MEDICARE

## 2020-09-21 ENCOUNTER — APPOINTMENT (EMERGENCY)
Dept: RADIOLOGY | Facility: HOSPITAL | Age: 80
DRG: 682 | End: 2020-09-21
Payer: MEDICARE

## 2020-09-21 DIAGNOSIS — Z51.5 COMFORT MEASURES ONLY STATUS: ICD-10-CM

## 2020-09-21 DIAGNOSIS — R79.1 SUPRATHERAPEUTIC INR: Primary | ICD-10-CM

## 2020-09-21 DIAGNOSIS — E87.0 HYPERNATREMIA: ICD-10-CM

## 2020-09-21 DIAGNOSIS — R41.82 ALTERED MENTAL STATUS: ICD-10-CM

## 2020-09-21 DIAGNOSIS — I50.42 CHRONIC COMBINED SYSTOLIC AND DIASTOLIC CONGESTIVE HEART FAILURE (HCC): ICD-10-CM

## 2020-09-21 DIAGNOSIS — K13.70 ORAL LESION: ICD-10-CM

## 2020-09-21 LAB
ALBUMIN SERPL BCP-MCNC: 3.4 G/DL (ref 3.5–5)
ALP SERPL-CCNC: 140 U/L (ref 46–116)
ALT SERPL W P-5'-P-CCNC: 17 U/L (ref 12–78)
ANION GAP SERPL CALCULATED.3IONS-SCNC: 15 MMOL/L (ref 4–13)
APTT PPP: 87 SECONDS (ref 23–37)
AST SERPL W P-5'-P-CCNC: 25 U/L (ref 5–45)
BASOPHILS # BLD AUTO: 0.01 THOUSANDS/ΜL (ref 0–0.1)
BASOPHILS NFR BLD AUTO: 0 % (ref 0–1)
BILIRUB SERPL-MCNC: 0.26 MG/DL (ref 0.2–1)
BUN SERPL-MCNC: 35 MG/DL (ref 5–25)
CALCIUM ALBUM COR SERPL-MCNC: 10 MG/DL (ref 8.3–10.1)
CALCIUM SERPL-MCNC: 9.5 MG/DL (ref 8.3–10.1)
CHLORIDE SERPL-SCNC: 112 MMOL/L (ref 100–108)
CO2 SERPL-SCNC: 24 MMOL/L (ref 21–32)
CREAT SERPL-MCNC: 1.46 MG/DL (ref 0.6–1.3)
EOSINOPHIL # BLD AUTO: 0.03 THOUSAND/ΜL (ref 0–0.61)
EOSINOPHIL NFR BLD AUTO: 1 % (ref 0–6)
ERYTHROCYTE [DISTWIDTH] IN BLOOD BY AUTOMATED COUNT: 15.9 % (ref 11.6–15.1)
GFR SERPL CREATININE-BSD FRML MDRD: 45 ML/MIN/1.73SQ M
GLUCOSE SERPL-MCNC: 132 MG/DL (ref 65–140)
HCT VFR BLD AUTO: 35.8 % (ref 36.5–49.3)
HGB BLD-MCNC: 11.4 G/DL (ref 12–17)
IMM GRANULOCYTES # BLD AUTO: 0.02 THOUSAND/UL (ref 0–0.2)
IMM GRANULOCYTES NFR BLD AUTO: 0 % (ref 0–2)
INR PPP: 8.77 (ref 0.84–1.19)
LACTATE SERPL-SCNC: 1.5 MMOL/L (ref 0.5–2)
LYMPHOCYTES # BLD AUTO: 0.45 THOUSANDS/ΜL (ref 0.6–4.47)
LYMPHOCYTES NFR BLD AUTO: 9 % (ref 14–44)
MAGNESIUM SERPL-MCNC: 2.2 MG/DL (ref 1.6–2.6)
MCH RBC QN AUTO: 32.1 PG (ref 26.8–34.3)
MCHC RBC AUTO-ENTMCNC: 31.8 G/DL (ref 31.4–37.4)
MCV RBC AUTO: 101 FL (ref 82–98)
MONOCYTES # BLD AUTO: 0.52 THOUSAND/ΜL (ref 0.17–1.22)
MONOCYTES NFR BLD AUTO: 11 % (ref 4–12)
NEUTROPHILS # BLD AUTO: 3.92 THOUSANDS/ΜL (ref 1.85–7.62)
NEUTS SEG NFR BLD AUTO: 79 % (ref 43–75)
NRBC BLD AUTO-RTO: 0 /100 WBCS
PLATELET # BLD AUTO: 82 THOUSANDS/UL (ref 149–390)
PMV BLD AUTO: 10.1 FL (ref 8.9–12.7)
POTASSIUM SERPL-SCNC: 4.5 MMOL/L (ref 3.5–5.3)
PROT SERPL-MCNC: 7.1 G/DL (ref 6.4–8.2)
PROTHROMBIN TIME: 70 SECONDS (ref 11.6–14.5)
RBC # BLD AUTO: 3.55 MILLION/UL (ref 3.88–5.62)
SARS-COV-2 RNA RESP QL NAA+PROBE: NEGATIVE
SODIUM SERPL-SCNC: 151 MMOL/L (ref 136–145)
TROPONIN I SERPL-MCNC: 0.06 NG/ML
WBC # BLD AUTO: 4.95 THOUSAND/UL (ref 4.31–10.16)

## 2020-09-21 PROCEDURE — 83735 ASSAY OF MAGNESIUM: CPT | Performed by: EMERGENCY MEDICINE

## 2020-09-21 PROCEDURE — 93005 ELECTROCARDIOGRAM TRACING: CPT

## 2020-09-21 PROCEDURE — 84145 PROCALCITONIN (PCT): CPT | Performed by: EMERGENCY MEDICINE

## 2020-09-21 PROCEDURE — 36415 COLL VENOUS BLD VENIPUNCTURE: CPT | Performed by: EMERGENCY MEDICINE

## 2020-09-21 PROCEDURE — 99285 EMERGENCY DEPT VISIT HI MDM: CPT | Performed by: EMERGENCY MEDICINE

## 2020-09-21 PROCEDURE — 83605 ASSAY OF LACTIC ACID: CPT | Performed by: EMERGENCY MEDICINE

## 2020-09-21 PROCEDURE — 85730 THROMBOPLASTIN TIME PARTIAL: CPT | Performed by: EMERGENCY MEDICINE

## 2020-09-21 PROCEDURE — 85025 COMPLETE CBC W/AUTO DIFF WBC: CPT | Performed by: EMERGENCY MEDICINE

## 2020-09-21 PROCEDURE — 87635 SARS-COV-2 COVID-19 AMP PRB: CPT | Performed by: EMERGENCY MEDICINE

## 2020-09-21 PROCEDURE — 87040 BLOOD CULTURE FOR BACTERIA: CPT | Performed by: EMERGENCY MEDICINE

## 2020-09-21 PROCEDURE — 99285 EMERGENCY DEPT VISIT HI MDM: CPT

## 2020-09-21 PROCEDURE — 84484 ASSAY OF TROPONIN QUANT: CPT | Performed by: EMERGENCY MEDICINE

## 2020-09-21 PROCEDURE — G1004 CDSM NDSC: HCPCS

## 2020-09-21 PROCEDURE — 80053 COMPREHEN METABOLIC PANEL: CPT | Performed by: EMERGENCY MEDICINE

## 2020-09-21 PROCEDURE — 85610 PROTHROMBIN TIME: CPT | Performed by: EMERGENCY MEDICINE

## 2020-09-21 PROCEDURE — 1123F ACP DISCUSS/DSCN MKR DOCD: CPT | Performed by: EMERGENCY MEDICINE

## 2020-09-21 PROCEDURE — 72125 CT NECK SPINE W/O DYE: CPT

## 2020-09-21 PROCEDURE — 71045 X-RAY EXAM CHEST 1 VIEW: CPT

## 2020-09-21 PROCEDURE — 70450 CT HEAD/BRAIN W/O DYE: CPT

## 2020-09-22 ENCOUNTER — APPOINTMENT (EMERGENCY)
Dept: CT IMAGING | Facility: HOSPITAL | Age: 80
DRG: 682 | End: 2020-09-22
Payer: MEDICARE

## 2020-09-22 PROBLEM — D64.9 NORMOCYTIC ANEMIA: Status: ACTIVE | Noted: 2020-09-22

## 2020-09-22 PROBLEM — E43 SEVERE PROTEIN-CALORIE MALNUTRITION (GOMEZ: LESS THAN 60% OF STANDARD WEIGHT) (HCC): Status: ACTIVE | Noted: 2020-09-22

## 2020-09-22 PROBLEM — D69.6 THROMBOCYTOPENIA (HCC): Status: ACTIVE | Noted: 2020-09-22

## 2020-09-22 PROBLEM — N17.9 AKI (ACUTE KIDNEY INJURY) (HCC): Status: ACTIVE | Noted: 2020-09-22

## 2020-09-22 PROBLEM — E87.0 HYPERNATREMIA: Status: ACTIVE | Noted: 2020-09-22

## 2020-09-22 LAB
ANION GAP SERPL CALCULATED.3IONS-SCNC: 9 MMOL/L (ref 4–13)
ANION GAP SERPL CALCULATED.3IONS-SCNC: 9 MMOL/L (ref 4–13)
ATRIAL RATE: 73 BPM
BACTERIA UR QL AUTO: ABNORMAL /HPF
BASOPHILS # BLD AUTO: 0.01 THOUSANDS/ΜL (ref 0–0.1)
BASOPHILS NFR BLD AUTO: 0 % (ref 0–1)
BILIRUB UR QL STRIP: ABNORMAL
BUN SERPL-MCNC: 30 MG/DL (ref 5–25)
BUN SERPL-MCNC: 34 MG/DL (ref 5–25)
CALCIUM SERPL-MCNC: 9.2 MG/DL (ref 8.3–10.1)
CALCIUM SERPL-MCNC: 9.3 MG/DL (ref 8.3–10.1)
CHLORIDE SERPL-SCNC: 116 MMOL/L (ref 100–108)
CHLORIDE SERPL-SCNC: 116 MMOL/L (ref 100–108)
CLARITY UR: ABNORMAL
CO2 SERPL-SCNC: 24 MMOL/L (ref 21–32)
CO2 SERPL-SCNC: 27 MMOL/L (ref 21–32)
COLOR UR: YELLOW
CREAT SERPL-MCNC: 1.2 MG/DL (ref 0.6–1.3)
CREAT SERPL-MCNC: 1.39 MG/DL (ref 0.6–1.3)
EOSINOPHIL # BLD AUTO: 0.02 THOUSAND/ΜL (ref 0–0.61)
EOSINOPHIL NFR BLD AUTO: 0 % (ref 0–6)
ERYTHROCYTE [DISTWIDTH] IN BLOOD BY AUTOMATED COUNT: 15.8 % (ref 11.6–15.1)
FINE GRAN CASTS URNS QL MICRO: ABNORMAL /LPF
GFR SERPL CREATININE-BSD FRML MDRD: 48 ML/MIN/1.73SQ M
GFR SERPL CREATININE-BSD FRML MDRD: 58 ML/MIN/1.73SQ M
GLUCOSE SERPL-MCNC: 72 MG/DL (ref 65–140)
GLUCOSE SERPL-MCNC: 74 MG/DL (ref 65–140)
GLUCOSE SERPL-MCNC: 74 MG/DL (ref 65–140)
GLUCOSE SERPL-MCNC: 79 MG/DL (ref 65–140)
GLUCOSE SERPL-MCNC: 83 MG/DL (ref 65–140)
GLUCOSE SERPL-MCNC: 95 MG/DL (ref 65–140)
GLUCOSE UR STRIP-MCNC: NEGATIVE MG/DL
HCT VFR BLD AUTO: 34.4 % (ref 36.5–49.3)
HGB BLD-MCNC: 11 G/DL (ref 12–17)
HGB UR QL STRIP.AUTO: ABNORMAL
HYALINE CASTS #/AREA URNS LPF: ABNORMAL /LPF
IMM GRANULOCYTES # BLD AUTO: 0.01 THOUSAND/UL (ref 0–0.2)
IMM GRANULOCYTES NFR BLD AUTO: 0 % (ref 0–2)
INR PPP: 9.4 (ref 0.84–1.19)
KETONES UR STRIP-MCNC: ABNORMAL MG/DL
LEUKOCYTE ESTERASE UR QL STRIP: NEGATIVE
LYMPHOCYTES # BLD AUTO: 0.58 THOUSANDS/ΜL (ref 0.6–4.47)
LYMPHOCYTES NFR BLD AUTO: 12 % (ref 14–44)
MCH RBC QN AUTO: 32.3 PG (ref 26.8–34.3)
MCHC RBC AUTO-ENTMCNC: 32 G/DL (ref 31.4–37.4)
MCV RBC AUTO: 101 FL (ref 82–98)
MONOCYTES # BLD AUTO: 0.66 THOUSAND/ΜL (ref 0.17–1.22)
MONOCYTES NFR BLD AUTO: 14 % (ref 4–12)
MUCOUS THREADS UR QL AUTO: ABNORMAL
NEUTROPHILS # BLD AUTO: 3.57 THOUSANDS/ΜL (ref 1.85–7.62)
NEUTS SEG NFR BLD AUTO: 74 % (ref 43–75)
NITRITE UR QL STRIP: NEGATIVE
NON-SQ EPI CELLS URNS QL MICRO: ABNORMAL /HPF
NRBC BLD AUTO-RTO: 0 /100 WBCS
P AXIS: -15 DEGREES
PH UR STRIP.AUTO: 5 [PH]
PLATELET # BLD AUTO: 77 THOUSANDS/UL (ref 149–390)
PMV BLD AUTO: 10.4 FL (ref 8.9–12.7)
POTASSIUM SERPL-SCNC: 4.2 MMOL/L (ref 3.5–5.3)
POTASSIUM SERPL-SCNC: 4.2 MMOL/L (ref 3.5–5.3)
PR INTERVAL: 156 MS
PROCALCITONIN SERPL-MCNC: 0.09 NG/ML
PROT UR STRIP-MCNC: ABNORMAL MG/DL
PROTHROMBIN TIME: 73.9 SECONDS (ref 11.6–14.5)
QRS AXIS: 72 DEGREES
QRSD INTERVAL: 98 MS
QT INTERVAL: 436 MS
QTC INTERVAL: 480 MS
RBC # BLD AUTO: 3.41 MILLION/UL (ref 3.88–5.62)
RBC #/AREA URNS AUTO: ABNORMAL /HPF
SODIUM SERPL-SCNC: 149 MMOL/L (ref 136–145)
SODIUM SERPL-SCNC: 152 MMOL/L (ref 136–145)
SP GR UR STRIP.AUTO: >=1.03 (ref 1–1.03)
T WAVE AXIS: 96 DEGREES
TROPONIN I SERPL-MCNC: 0.04 NG/ML
UROBILINOGEN UR QL STRIP.AUTO: 0.2 E.U./DL
VENTRICULAR RATE: 73 BPM
WBC # BLD AUTO: 4.85 THOUSAND/UL (ref 4.31–10.16)
WBC #/AREA URNS AUTO: ABNORMAL /HPF

## 2020-09-22 PROCEDURE — 92610 EVALUATE SWALLOWING FUNCTION: CPT

## 2020-09-22 PROCEDURE — 82948 REAGENT STRIP/BLOOD GLUCOSE: CPT

## 2020-09-22 PROCEDURE — 85025 COMPLETE CBC W/AUTO DIFF WBC: CPT | Performed by: NURSE PRACTITIONER

## 2020-09-22 PROCEDURE — 80048 BASIC METABOLIC PNL TOTAL CA: CPT | Performed by: NURSE PRACTITIONER

## 2020-09-22 PROCEDURE — G1004 CDSM NDSC: HCPCS

## 2020-09-22 PROCEDURE — 96360 HYDRATION IV INFUSION INIT: CPT

## 2020-09-22 PROCEDURE — 81001 URINALYSIS AUTO W/SCOPE: CPT | Performed by: EMERGENCY MEDICINE

## 2020-09-22 PROCEDURE — 84484 ASSAY OF TROPONIN QUANT: CPT | Performed by: NURSE PRACTITIONER

## 2020-09-22 PROCEDURE — 71250 CT THORAX DX C-: CPT

## 2020-09-22 PROCEDURE — 74176 CT ABD & PELVIS W/O CONTRAST: CPT

## 2020-09-22 PROCEDURE — 85610 PROTHROMBIN TIME: CPT | Performed by: NURSE PRACTITIONER

## 2020-09-22 PROCEDURE — 99233 SBSQ HOSP IP/OBS HIGH 50: CPT | Performed by: INTERNAL MEDICINE

## 2020-09-22 PROCEDURE — 87081 CULTURE SCREEN ONLY: CPT | Performed by: NURSE PRACTITIONER

## 2020-09-22 PROCEDURE — 99223 1ST HOSP IP/OBS HIGH 75: CPT | Performed by: NURSE PRACTITIONER

## 2020-09-22 RX ORDER — PANTOPRAZOLE SODIUM 40 MG/1
40 TABLET, DELAYED RELEASE ORAL
Status: DISCONTINUED | OUTPATIENT
Start: 2020-09-22 | End: 2020-09-25

## 2020-09-22 RX ORDER — SUCRALFATE 1 G/1
1 TABLET ORAL 2 TIMES DAILY
Status: DISCONTINUED | OUTPATIENT
Start: 2020-09-22 | End: 2020-09-25

## 2020-09-22 RX ORDER — SODIUM CHLORIDE 450 MG/100ML
75 INJECTION, SOLUTION INTRAVENOUS CONTINUOUS
Status: DISCONTINUED | OUTPATIENT
Start: 2020-09-22 | End: 2020-09-24

## 2020-09-22 RX ORDER — ONDANSETRON 2 MG/ML
4 INJECTION INTRAMUSCULAR; INTRAVENOUS EVERY 8 HOURS PRN
Status: DISCONTINUED | OUTPATIENT
Start: 2020-09-22 | End: 2020-09-25 | Stop reason: HOSPADM

## 2020-09-22 RX ORDER — DOCUSATE SODIUM 100 MG/1
100 CAPSULE, LIQUID FILLED ORAL 2 TIMES DAILY
Status: DISCONTINUED | OUTPATIENT
Start: 2020-09-22 | End: 2020-09-25 | Stop reason: HOSPADM

## 2020-09-22 RX ORDER — SERTRALINE HYDROCHLORIDE 25 MG/1
25 TABLET, FILM COATED ORAL EVERY MORNING
Status: DISCONTINUED | OUTPATIENT
Start: 2020-09-22 | End: 2020-09-25

## 2020-09-22 RX ORDER — PHYTONADIONE 5 MG/1
5 TABLET ORAL ONCE
Status: COMPLETED | OUTPATIENT
Start: 2020-09-22 | End: 2020-09-22

## 2020-09-22 RX ORDER — GABAPENTIN 100 MG/1
100 CAPSULE ORAL
Status: DISCONTINUED | OUTPATIENT
Start: 2020-09-22 | End: 2020-09-25

## 2020-09-22 RX ORDER — ATORVASTATIN CALCIUM 10 MG/1
10 TABLET, FILM COATED ORAL
Status: DISCONTINUED | OUTPATIENT
Start: 2020-09-22 | End: 2020-09-25

## 2020-09-22 RX ORDER — ACETAMINOPHEN 325 MG/1
650 TABLET ORAL EVERY 6 HOURS PRN
Status: DISCONTINUED | OUTPATIENT
Start: 2020-09-22 | End: 2020-09-25 | Stop reason: HOSPADM

## 2020-09-22 RX ORDER — ROPINIROLE 0.25 MG/1
0.5 TABLET, FILM COATED ORAL
Status: DISCONTINUED | OUTPATIENT
Start: 2020-09-22 | End: 2020-09-25

## 2020-09-22 RX ORDER — POTASSIUM CHLORIDE 750 MG/1
10 TABLET, EXTENDED RELEASE ORAL 2 TIMES DAILY
Status: DISCONTINUED | OUTPATIENT
Start: 2020-09-22 | End: 2020-09-25

## 2020-09-22 RX ORDER — MIRTAZAPINE 15 MG/1
30 TABLET, FILM COATED ORAL
Status: DISCONTINUED | OUTPATIENT
Start: 2020-09-22 | End: 2020-09-25

## 2020-09-22 RX ORDER — LEVOTHYROXINE SODIUM 0.05 MG/1
50 TABLET ORAL
Status: DISCONTINUED | OUTPATIENT
Start: 2020-09-22 | End: 2020-09-25

## 2020-09-22 RX ORDER — TAMSULOSIN HYDROCHLORIDE 0.4 MG/1
0.4 CAPSULE ORAL
Status: DISCONTINUED | OUTPATIENT
Start: 2020-09-22 | End: 2020-09-25 | Stop reason: HOSPADM

## 2020-09-22 RX ORDER — QUETIAPINE FUMARATE 25 MG/1
50 TABLET, FILM COATED ORAL
Status: DISCONTINUED | OUTPATIENT
Start: 2020-09-22 | End: 2020-09-25

## 2020-09-22 RX ORDER — ALPRAZOLAM 0.25 MG/1
0.25 TABLET ORAL 3 TIMES DAILY PRN
Status: DISCONTINUED | OUTPATIENT
Start: 2020-09-22 | End: 2020-09-25 | Stop reason: HOSPADM

## 2020-09-22 RX ORDER — ASPIRIN 81 MG/1
81 TABLET ORAL DAILY
Status: DISCONTINUED | OUTPATIENT
Start: 2020-09-22 | End: 2020-09-24

## 2020-09-22 RX ADMIN — PHYTONADIONE 5 MG: 5 TABLET ORAL at 00:55

## 2020-09-22 RX ADMIN — SODIUM CHLORIDE 1000 ML: 0.9 INJECTION, SOLUTION INTRAVENOUS at 00:22

## 2020-09-22 RX ADMIN — SODIUM CHLORIDE 50 ML/HR: 0.45 INJECTION, SOLUTION INTRAVENOUS at 06:32

## 2020-09-22 NOTE — H&P
[facility]    H&P- [facility] 1940, 68 y o  male MRN: [id number]    Unit/Bed#: -[facility] Encounter: [id number]    Primary Care Provider: Marina Hook MD   Date and time admitted to hospital: 9/21/2020  9:53 PM        * Hypernatremia  Assessment & Plan  · [facility] hospital [facility] for refusing meals, verbally non-responsive  · Sodium 151  · Free water deficit 2 14 L  · Received 1 L normal saline bolus in the ER  · Recheck metabolic panel  · Half-normal saline at 50 mL an hour  · Recheck metabolic panel at 5:64 p m  Normocytic anemia  Assessment & Plan  · Hemoglobin 11 4  His recent baseline has been 9-10  This value elevated above baseline could be due in part to hemoconcentration  · Daily CBC and trend hemoglobin    [facility]  Assessment & Plan  · Creatinine 1 46 with baseline 0 9-1 0 suspect prerenal due to decreased oral intake  · Received 1 L normal saline in the ER  · Recheck metabolic panel, trend creatinine  · Avoid hypotension and nephrotoxins    Chronic idiopathic thrombocytopenia (HCC)  Assessment & Plan  · Platelets 82  His recent baseline has been around 100-110  · Was started on B12 last admission-continue this  · Daily CBC and trend platelets  · Monitor for bleeding    Left ventricular apical thrombus  Assessment & Plan  · Left ventricular thrombus 07/2020  · Daily PT/INR  · Resume Coumadin when INR around 2    Chronic combined systolic and diastolic congestive heart failure (HCC)  Assessment & Plan  Wt Readings from Last 3 Encounters:   09/22/20 50 3 kg (110 lb 12 8 oz)   07/09/20 56 6 kg (124 lb 12 5 oz)       · Does not appear volume overloaded  · Last echocardiogram 06/29/2020:  EF:  35-40% with G3DD  · CXR:  Pacemaker in place  No evidence of effusion or consolidation    Radiology read is pending  · Low-salt diet  · Daily weights and I&Os  · Continue metoprolol        Bipolar 1 disorder, depressed ([facility] )  Assessment & Plan  · Appears to be at baseline  · Continue home medication including Seroquel, Zoloft, Remeron  · Supportive care    Type 2 diabetes mellitus with diabetic neuropathy, without long-term current use of insulin [facility])  Assessment & Plan  Lab Results   Component Value Date    HGBA1C 5 2 08/17/2020       No results for input(s): POCGLU in the last 72 hours  Blood Sugar Average: Last 72 hrs:     · No concentrated sweets diet with thin liquids  · Fingerstick blood sugar 4 times daily-will hold off on sliding scale until glucose trended-does not appear to be on diabetic medication at the nursing home    Permanent atrial fibrillation (HCC)  Assessment & Plan  · EKG: NSR rate of 72  · Rate controlled with metoprolol  · Anticoagulated with Coumadin-INR today: 8 77  · Received 5 mg vitamin K orally in the ER  · Hold Coumadin today  · Daily PT INR      VTE Prophylaxis: Warfarin (Coumadin)  / sequential compression device   Code Status:  DNR  POLST: POLST form is on file already (pre-hospital)  Discussion with family:  DNR on file    Anticipated Length of Stay:  Patient will be admitted on an Inpatient basis with an anticipated length of stay of  greater than 2 midnights  Justification for Hospital Stay:  Per plan above    Total Time for Visit, including Counseling / Coordination of Care: 1 hour  Greater than 50% of this total time spent on direct patient counseling and coordination of care  Chief Complaint:   Decreased responsiveness, not eating    History of Present Illness:    Marilee Costello is a 68 y o  male with history of AFib on Coumadin, bipolar 1 disorder, non-insulin-dependent type 2 diabetes, hypothyroidism, CAD, urinary retention, LV thrombus, pacemaker, combined systolic and diastolic CHF, anemia, and chronic idiopathic thrombocytopenia who presents with decreased responsiveness at nursing home and not eating  The patient is nonverbal and unable to participate review of systems    Sodium in the ER found to be 155  INR was supratherapeutic at 8, patient received 5 mg of vitamin K orally  Review of Systems:    Review of Systems   Unable to perform ROS: Patient nonverbal       Past Medical and Surgical History:     Past Medical History:   Diagnosis Date    A-fib ([place] [address] )     Anemia     Bipolar 1 disorder ([place] [address] )     Cardiomegaly     Depression     Diabetes mellitus ([place] [address] )     Disease of thyroid gland     Epigastric pain     MI (myocardial infarction) ([place] [address] )     Panic disorder (episodic paroxysmal anxiety)     SVT (supraventricular tachycardia) ([place] [address] )     Urinary retention     Vertigo        History reviewed  No pertinent surgical history  Meds/Allergies:    Prior to Admission medications    Medication Sig Start Date End Date Taking? Authorizing Provider   acetaminophen (TYLENOL) 325 mg tablet Take 650 mg by mouth every 6 (six) hours as needed for mild pain    Historical Provider, MD   ALPRAZolam (XANAX) 0 25 mg tablet Take 1 tablet (0 25 mg total) by mouth 3 (three) times a day as needed for anxiety (30 min prior to meals) for up to 10 days 7/9/20 7/19/20  Rachael Galindo MD   aspirin (ECOTRIN LOW STRENGTH) 81 mg EC tablet Take 81 mg by mouth daily    Historical Provider, MD   atorvastatin (LIPITOR) 10 mg tablet Take 10 mg by mouth daily    Historical Provider, MD   cyanocobalamin (VITAMIN B-12) 1000 MCG tablet Take 1 tablet (1,000 mcg total) by mouth daily 7/10/20 8/9/20  Rachael Galindo MD   docusate sodium (COLACE) 100 mg capsule Take 1 capsule (100 mg total) by mouth 2 (two) times a day 7/9/20   Rachael Galindo MD   enoxaparin (LOVENOX) 60 mg/0 6 mL Inject 0 6 mL (60 mg total) under the skin every 12 (twelve) hours Once INR reach to 2, please discontinue Lovenox  Continue only Coumadin   7/9/20   aRchael Galindo MD   ergocalciferol (VITAMIN D2) 50,000 units Take 50,000 Units by mouth once a week TUESDAY    Historical Provider, MD   gabapentin (NEURONTIN) 100 mg capsule Take 100 mg by mouth daily at bedtime    Historical Provider, MD   levothyroxine 50 mcg tablet Take 50 mcg by mouth daily    Historical Provider, MD   metoprolol tartrate (LOPRESSOR) 25 mg tablet Take 1 tablet (25 mg total) by mouth every morning 7/10/20 8/9/20  Reji Cruz MD   mirtazapine (REMERON) 30 mg tablet Take 1 tablet (30 mg total) by mouth daily at bedtime 7/9/20 8/8/20  Reji Cruz MD   nitroglycerin (NITROSTAT) 0 4 mg SL tablet Place 0 4 mg under the tongue every 5 (five) minutes as needed for chest pain    Historical Provider, MD   pantoprazole (PROTONIX) 40 mg tablet Take 40 mg by mouth daily    Historical Provider, MD   potassium chloride (MICRO-K) 10 MEQ CR capsule Take 10 mEq by mouth 2 (two) times a day    Historical Provider, MD   QUEtiapine (SEROquel) 50 mg tablet Take 50 mg by mouth daily at bedtime    Historical Provider, MD   ramipril (ALTACE) 10 MG capsule Take 10 mg by mouth daily    Historical Provider, MD   rOPINIRole (REQUIP) 0 5 mg tablet Take 0 5 mg by mouth daily at bedtime    Historical Provider, MD   sertraline (ZOLOFT) 25 mg tablet Take 25 mg by mouth every morning    Historical Provider, MD   sucralfate (CARAFATE) 1 g tablet Take 1 g by mouth 2 (two) times a day    Historical Provider, MD   tamsulosin (FLOMAX) 0 4 mg Take 0 4 mg by mouth daily with dinner    Historical Provider, MD   warfarin (COUMADIN) 7 5 mg tablet Target INR is 2-3  Once INR reached to 2, please discontinue Lovenox   (lovenox is using for Bridging purpose) 7/9/20   Reji Cruz MD     I have reveiwed home medications using records provided by [facility]      Allergies: No Known Allergies    Social History:     Marital Status: Single   Occupation:  Retired  Patient Pre-hospital Living Situation:  [address]  Patient Pre-hospital Level of Mobility:  Requires assist  Patient Pre-hospital Diet Restrictions:  Diabetic low-salt  Substance Use History:   Social History     Substance and Sexual Activity   Alcohol Use Never    Frequency: Never    Binge frequency: Never     Social History     Tobacco Use   Smoking Status Never Smoker   Smokeless Tobacco Never Used     Social History     Substance and Sexual Activity   Drug Use Never       Family History:    non-contributory    Physical Exam:     Vitals:   Blood Pressure: 120/77 (09/22/20 0338)  Pulse: 73 (09/22/20 0338)  Temperature: (!) 97 4 °F (36 3 °C) (09/22/20 0338)  Temp Source: Temporal (09/21/20 2151)  Respirations: 18 (09/22/20 0338)  Height: 5' 10" (177 8 cm) (09/22/20 0338)  Weight - Scale: 50 3 kg (110 lb 12 8 oz) (09/22/20 0338)  SpO2: 100 % (09/22/20 0347)    Physical Exam  Vitals signs and nursing note reviewed  Constitutional:       Appearance: He is underweight  He is ill-appearing  HENT:      Head: Normocephalic and atraumatic  Mouth/Throat:      Mouth: Mucous membranes are dry  Pharynx: Oropharynx is clear  Eyes:      Extraocular Movements: Extraocular movements intact  Pupils: Pupils are equal, round, and reactive to light  Neck:      Musculoskeletal: Normal range of motion and neck supple  Cardiovascular:      Rate and Rhythm: Normal rate and regular rhythm  Pulses: Normal pulses  Heart sounds: Normal heart sounds  Pulmonary:      Effort: Pulmonary effort is normal       Breath sounds: Normal breath sounds  Abdominal:      General: Abdomen is scaphoid  Bowel sounds are normal       Palpations: Abdomen is soft  Tenderness: There is no abdominal tenderness  Musculoskeletal: Normal range of motion  Skin:     General: Skin is warm and dry  Capillary Refill: Capillary refill takes less than 2 seconds  Coloration: Skin is pale  Neurological:      Mental Status: He is alert  Mental status is at baseline  Additional Data:     Lab Results: I have personally reviewed pertinent reports        Results from last 7 days   Lab Units 09/22/20  0517   WBC Thousand/uL 4 85   HEMOGLOBIN g/dL 11 0*   HEMATOCRIT % 34 4* PLATELETS Thousands/uL 77*   NEUTROS PCT % 74   LYMPHS PCT % 12*   MONOS PCT % 14*   EOS PCT % 0     Results from last 7 days   Lab Units 09/22/20  0517 09/21/20  2233   SODIUM mmol/L 152* 151*   POTASSIUM mmol/L 4 2 4 5   CHLORIDE mmol/L 116* 112*   CO2 mmol/L 27 24   BUN mg/dL 34* 35*   CREATININE mg/dL 1 39* 1 46*   ANION GAP mmol/L 9 15*   CALCIUM mg/dL 9 3 9 5   ALBUMIN g/dL  --  3 4*   TOTAL BILIRUBIN mg/dL  --  0 26   ALK PHOS U/L  --  140*   ALT U/L  --  17   AST U/L  --  25   GLUCOSE RANDOM mg/dL 95 132     Results from last 7 days   Lab Units 09/21/20  2233   INR  8 77*             Results from last 7 days   Lab Units 09/21/20  2233   LACTIC ACID mmol/L 1 5       Imaging: I have personally reviewed pertinent reports  and I have personally reviewed pertinent films in PACS    CT chest abdomen pelvis wo contrast   Final Result by Barrie Garcia MD (09/22 0639)      Limited examination secondary to lack of intravenous contrast   No definite source of infection identified within the thorax, abdomen or pelvis  No acute thoracic or abdominopelvic disease  Minimal soft tissue gas noted the level of the right thoracic inlet, which may be iatrogenic  Correlate clinically  Additional findings as above  Workstation performed: [id number]         TRAUMA - CT head wo contrast   Final Result by Maritza Lopez MD (09/22 0018)      No acute intracranial abnormality  Workstation performed: [id number]         TRAUMA - CT spine cervical wo contrast   Final Result by Maritza Lopez MD (09/22 0008)      No cervical spine fracture or traumatic malalignment  Workstation performed: [id number]         XR chest portable    (Results Pending)       Allscripts / Epic Records Reviewed: Yes     ** Please Note: This note has been constructed using a voice recognition system   **

## 2020-09-22 NOTE — ASSESSMENT & PLAN NOTE
Malnutrition Findings:   Malnutrition type: Chronic illness(related to depression/fear of eating, decreased appetite as evidenced by 23 1% wt loss x 1 year (10/1/19 143#, 9/22/20 110#), severe muscle wasting to temporalis, deltoid, pectoralis, interroseous, quadriceps muscles, severe fat loss at orbitals, triceps)  Degree of Malnutrition: Other severe protein calorie malnutrition(Treated with: Recommend psychiatric evaluation  Will liberalize diet to regular with 4 gm JOSHUA, fluid restriction per MD  Dysphagia diet per RN/ST  Will trial ensure clear supplementation BID due to previous fear of milk products  Multivitamin supplement)    BMI Findings:  BMI Classifications: Underweight < 18 5     Body mass index is 15 9 kg/m²       Patient refusing food  Check calorie count  Discussed goal of care with family

## 2020-09-22 NOTE — CONSULTS
Consult received for CHF ed, diet ed not appropriate  Pt with hx of severe PCM, continues with further wt loss of 10#, total of 23 1% wt loss x 1 year  Pt minimally responsive during time of RD assessment, unable to obtain diet hx at this time  Previously pt would refuse to eat out of fear of choking even though ST had assessed pt and was placed on recommended pureed diet at the time  Presently ST recommending regular with thins, RN/ST to adjust if noting s/s of aspiration  Physical exam performed noting severe muscle and fat loss  Continues with severe PCM, see malnutrition note on 9/22/20  Pt previously assessed by psychiatry during last admission, per note on 7/1/20: "if failure to thrive persists, there may be indication for inpatient psychiatric/medical psychiatric hospitalization to further treat his depression"  Recommend psychiatric evaluation  Will liberalize diet to regular with 4 gm JOSHUA, fluid restriction per MD      If starting gentle dextrose infusion due to poor PO, recommend addition of thiamine due to high risk for refeeding  Recommend multivitamin supplementation given PCM  Will trial ensure clear BID as pt previously had fear of milk containing products  Thank you for the consult  Will continue to follow

## 2020-09-22 NOTE — NURSING NOTE
This RN contacted [address] [facility] to complete patients admission navigator  Per staff, pt is w/c bound but also uses walker Ax1  Staff unable to locate last pneumonia vaccination  His most recent vaccination is a flu shot from 2018  He has had an 8 lb wt loss in the past 3-4 weeks with a decreased appetite within the past four days  Pt appears to have muscle wasting/fat loss  Pt had complained at [facility] of trouble swallowing however this is not a new complaint  Will consult nutrition/speech to gisela pt  According to [id number] [facility] staff, pt has refused tube feeding in past hospitalizations  Staff is to fax over pt's living will

## 2020-09-22 NOTE — ASSESSMENT & PLAN NOTE
Lab Results   Component Value Date    HGBA1C 5 2 08/17/2020       No results for input(s): POCGLU in the last 72 hours      Blood Sugar Average: Last 72 hrs:     · No concentrated sweets diet with thin liquids  · Fingerstick blood sugar 4 times daily-will hold off on sliding scale until glucose trended-does not appear to be on diabetic medication at the nursing home

## 2020-09-22 NOTE — ED PROVIDER NOTES
History  Chief Complaint   Patient presents with    Altered Mental Status     Patient was found by staff and EMS to be unresponsive  EMS placed patient on nonrebreather and started on fluids  Patient alert and oriented at this time  53-year-old male with past medical history of failure to thrive, mi, diabetes, AFib on Coumadin presents ED status post being found unresponsive  Patient is amnesic to event, is AAO x2 upon physician exam       History provided by:  EMS personnel  Altered Mental Status   Presenting symptoms: lethargy and unresponsiveness    Severity:  Moderate  Most recent episode: Today  Episode history:  Unable to specify  Chronicity:  New  Context: nursing home resident    Associated symptoms: weakness    Associated symptoms: no abdominal pain, no agitation, no fever, no headaches and no slurred speech        Prior to Admission Medications   Prescriptions Last Dose Informant Patient Reported? Taking? ALPRAZolam (XANAX) 0 25 mg tablet   No No   Sig: Take 1 tablet (0 25 mg total) by mouth 3 (three) times a day as needed for anxiety (30 min prior to meals) for up to 10 days   QUEtiapine (SEROquel) 50 mg tablet   Yes No   Sig: Take 50 mg by mouth daily at bedtime   acetaminophen (TYLENOL) 325 mg tablet   Yes No   Sig: Take 650 mg by mouth every 6 (six) hours as needed for mild pain   aspirin (ECOTRIN LOW STRENGTH) 81 mg EC tablet   Yes No   Sig: Take 81 mg by mouth daily   atorvastatin (LIPITOR) 10 mg tablet   Yes No   Sig: Take 10 mg by mouth daily   cyanocobalamin (VITAMIN B-12) 1000 MCG tablet   No No   Sig: Take 1 tablet (1,000 mcg total) by mouth daily   docusate sodium (COLACE) 100 mg capsule   No No   Sig: Take 1 capsule (100 mg total) by mouth 2 (two) times a day   enoxaparin (LOVENOX) 60 mg/0 6 mL   No No   Sig: Inject 0 6 mL (60 mg total) under the skin every 12 (twelve) hours Once INR reach to 2, please discontinue Lovenox    Continue only Coumadin    ergocalciferol (VITAMIN D2) 50,000 units   Yes No   Sig: Take 50,000 Units by mouth once a week TUESDAY   gabapentin (NEURONTIN) 100 mg capsule   Yes No   Sig: Take 100 mg by mouth daily at bedtime   levothyroxine 50 mcg tablet   Yes No   Sig: Take 50 mcg by mouth daily   metoprolol tartrate (LOPRESSOR) 25 mg tablet   No No   Sig: Take 1 tablet (25 mg total) by mouth every morning   mirtazapine (REMERON) 30 mg tablet   No No   Sig: Take 1 tablet (30 mg total) by mouth daily at bedtime   nitroglycerin (NITROSTAT) 0 4 mg SL tablet   Yes No   Sig: Place 0 4 mg under the tongue every 5 (five) minutes as needed for chest pain   pantoprazole (PROTONIX) 40 mg tablet   Yes No   Sig: Take 40 mg by mouth daily   potassium chloride (MICRO-K) 10 MEQ CR capsule   Yes No   Sig: Take 10 mEq by mouth 2 (two) times a day   rOPINIRole (REQUIP) 0 5 mg tablet   Yes No   Sig: Take 0 5 mg by mouth daily at bedtime   ramipril (ALTACE) 10 MG capsule   Yes No   Sig: Take 10 mg by mouth daily   sertraline (ZOLOFT) 25 mg tablet   Yes No   Sig: Take 25 mg by mouth every morning   sucralfate (CARAFATE) 1 g tablet   Yes No   Sig: Take 1 g by mouth 2 (two) times a day   tamsulosin (FLOMAX) 0 4 mg   Yes No   Sig: Take 0 4 mg by mouth daily with dinner   warfarin (COUMADIN) 7 5 mg tablet   No No   Sig: Target INR is 2-3  Once INR reached to 2, please discontinue Lovenox   (lovenox is using for Bridging purpose)      Facility-Administered Medications: None       Past Medical History:   Diagnosis Date    A-fib ([facility] )     Anemia     Bipolar 1 disorder ([facility] )     Cardiomegaly     Depression     Diabetes mellitus (HCC)     Disease of thyroid gland     Epigastric pain     MI (myocardial infarction) (HCC)     Panic disorder (episodic paroxysmal anxiety)     SVT (supraventricular tachycardia) (HCC)     Urinary retention     Vertigo        History reviewed  No pertinent surgical history      Family History   Family history unknown: Yes     I have reviewed and agree with the history as documented  E-Cigarette/Vaping    E-Cigarette Use Never User      E-Cigarette/Vaping Substances    Nicotine No     THC No     CBD No     Flavoring No     Other No     Unknown No      Social History     Tobacco Use    Smoking status: Never Smoker    Smokeless tobacco: Never Used   Substance Use Topics    Alcohol use: Never     Frequency: Never     Binge frequency: Never    Drug use: Never       Review of Systems   Unable to perform ROS: Acuity of condition   Constitutional: Negative for fever  Gastrointestinal: Negative for abdominal pain  Neurological: Positive for weakness  Negative for headaches  Psychiatric/Behavioral: Negative for agitation  All other systems reviewed and are negative  Physical Exam  Physical Exam  Vitals signs and nursing note reviewed  Constitutional:       General: He is not in acute distress  Appearance: He is ill-appearing  Comments: Emaciated   HENT:      Head: Normocephalic and atraumatic  Right Ear: External ear normal       Left Ear: External ear normal    Eyes:      Conjunctiva/sclera: Conjunctivae normal       Pupils: Pupils are equal, round, and reactive to light  Neck:      Comments: Negative midline cervical tenderness  Cardiovascular:      Rate and Rhythm: Normal rate and regular rhythm  Heart sounds: Normal heart sounds  No murmur  Pulmonary:      Effort: Pulmonary effort is normal       Breath sounds: Normal breath sounds  No wheezing or rales  Abdominal:      General: Bowel sounds are normal  There is no distension  Palpations: Abdomen is soft  Tenderness: There is no abdominal tenderness  There is no guarding or rebound  Musculoskeletal: Normal range of motion  General: No deformity  Skin:     General: Skin is warm  Coloration: Skin is pale  Findings: No rash  Neurological:      General: No focal deficit present  Mental Status: He is lethargic and confused        Cranial Nerves: No cranial nerve deficit  Psychiatric:         Mood and Affect: Mood is depressed           Vital Signs  ED Triage Vitals   Temperature Pulse Respirations Blood Pressure SpO2   09/21/20 2151 09/21/20 2158 09/21/20 2151 09/21/20 2151 09/21/20 2158   (!) 96 2 °F (35 7 °C) 62 18 110/56 100 %      Temp Source Heart Rate Source Patient Position - Orthostatic VS BP Location FiO2 (%)   09/21/20 2151 09/21/20 2158 09/21/20 2151 09/21/20 2151 --   Temporal Monitor Lying Right arm       Pain Score       --                  Vitals:    09/22/20 0000 09/22/20 0030 09/22/20 0317 09/22/20 0338   BP: 142/77 147/66 146/86 120/77   Pulse: 72 64 75 73   Patient Position - Orthostatic VS: Lying Lying Lying          Visual Acuity  Visual Acuity      Most Recent Value   L Pupil Size (mm)  3   R Pupil Size (mm)  3          ED Medications  Medications   ALPRAZolam (XANAX) tablet 0 25 mg (has no administration in time range)   aspirin (ECOTRIN LOW STRENGTH) EC tablet 81 mg (has no administration in time range)   atorvastatin (LIPITOR) tablet 10 mg (has no administration in time range)   docusate sodium (COLACE) capsule 100 mg (has no administration in time range)   gabapentin (NEURONTIN) capsule 100 mg (has no administration in time range)   levothyroxine tablet 50 mcg (has no administration in time range)   metoprolol tartrate (LOPRESSOR) tablet 25 mg (has no administration in time range)   mirtazapine (REMERON) tablet 30 mg (has no administration in time range)   pantoprazole (PROTONIX) EC tablet 40 mg (has no administration in time range)   potassium chloride (K-DUR,KLOR-CON) CR tablet 10 mEq (has no administration in time range)   QUEtiapine (SEROquel) tablet 50 mg (has no administration in time range)   rOPINIRole (REQUIP) tablet 0 5 mg (has no administration in time range)   sertraline (ZOLOFT) tablet 25 mg (has no administration in time range)   sucralfate (CARAFATE) tablet 1 g (has no administration in time range) tamsulosin (FLOMAX) capsule 0 4 mg (has no administration in time range)   ondansetron (ZOFRAN) injection 4 mg (has no administration in time range)   acetaminophen (TYLENOL) tablet 650 mg (has no administration in time range)   sodium chloride infusion 0 45 % (50 mL/hr Intravenous New Bag 9/22/20 9893)   sodium chloride 0 9 % bolus 1,000 mL (0 mL Intravenous Stopped 9/22/20 0057)   phytonadione (MEPHYTON) tablet 5 mg (5 mg Oral Given 9/22/20 0055)       Diagnostic Studies  Results Reviewed     Procedure Component Value Units Date/Time    UA w Reflex to Microscopic w Reflex to Culture [[id number]]  (Abnormal) Collected:  09/22/20 0500    Lab Status:  Final result Specimen:  Urine, Straight Cath Updated:  09/22/20 0513     Color, UA Yellow     Clarity, UA Slightly Cloudy     Specific Gravity, UA >=1 030     pH, UA 5 0     Leukocytes, UA Negative     Nitrite, UA Negative     Protein, UA Trace mg/dl      Glucose, UA Negative mg/dl      Ketones, UA 15 (1+) mg/dl      Urobilinogen, UA 0 2 E U /dl      Bilirubin, UA Small     Blood, UA Small    [facility] [[id number]]  (Normal) Collected:  09/21/20 2225    Lab Status:  Final result Specimen:  Nares from Nose Updated:  09/21/20 2328     SARS-CoV-2 Negative    Narrative: The specimen collection materials, transport medium, and/or testing methodology utilized in the production of these test results have been proven to be reliable in a limited validation with an abbreviated program under the Emergency Utilization Authorization provided by the FDA  Testing reported as "Presumptive positive" will be confirmed with secondary testing with a reference laboratory to ensure result accuracy  Clinical caution and judgement should be used with the interpretation of these results with consideration of the clinical impression and other laboratory testing    Testing reported as "Positive" or "Negative" has been proven to be accurate according to standard laboratory validation requirements  All testing is performed with control materials showing appropriate reactivity at standard intervals  Lactic acid [[id number]]  (Normal) Collected:  09/21/20 2233    Lab Status:  Final result Specimen:  Blood from Arm, Right Updated:  09/21/20 2304     LACTIC ACID 1 5 mmol/L     Narrative:       Result may be elevated if tourniquet was used during collection      Troponin I [[id number]]  (Abnormal) Collected:  09/21/20 2233    Lab Status:  Final result Specimen:  Blood from Arm, Right Updated:  09/21/20 2304     Troponin I 0 06 ng/mL     CBC and differential [[id number]]  (Abnormal) Collected:  09/21/20 2233    Lab Status:  Final result Specimen:  Blood from Arm, Right Updated:  09/21/20 2303     WBC 4 95 Thousand/uL      RBC 3 55 Million/uL      Hemoglobin 11 4 g/dL      Hematocrit 35 8 %       fL      MCH 32 1 pg      MCHC 31 8 g/dL      RDW 15 9 %      MPV 10 1 fL      Platelets 82 Thousands/uL      nRBC 0 /100 WBCs      Neutrophils Relative 79 %      Immat GRANS % 0 %      Lymphocytes Relative 9 %      Monocytes Relative 11 %      Eosinophils Relative 1 %      Basophils Relative 0 %      Neutrophils Absolute 3 92 Thousands/µL      Immature Grans Absolute 0 02 Thousand/uL      Lymphocytes Absolute 0 45 Thousands/µL      Monocytes Absolute 0 52 Thousand/µL      Eosinophils Absolute 0 03 Thousand/µL      Basophils Absolute 0 01 Thousands/µL     Comprehensive metabolic panel [[id number]]  (Abnormal) Collected:  09/21/20 2233    Lab Status:  Final result Specimen:  Blood from Arm, Right Updated:  09/21/20 2259     Sodium 151 mmol/L      Potassium 4 5 mmol/L      Chloride 112 mmol/L      CO2 24 mmol/L      ANION GAP 15 mmol/L      BUN 35 mg/dL      Creatinine 1 46 mg/dL      Glucose 132 mg/dL      Calcium 9 5 mg/dL      Corrected Calcium 10 0 mg/dL      AST 25 U/L      ALT 17 U/L      Alkaline Phosphatase 140 U/L      Total Protein 7 1 g/dL      Albumin 3 4 g/dL      Total Bilirubin 0 26 mg/dL      eGFR 45 ml/min/1 73sq m     Narrative:       [place] guidelines for Chronic Kidney Disease (CKD):     Stage 1 with normal or high GFR (GFR > 90 mL/min/1 73 square meters)    Stage 2 Mild CKD (GFR = 60-89 mL/min/1 73 square meters)    Stage 3A Moderate CKD (GFR = 45-59 mL/min/1 73 square meters)    Stage 3B Moderate CKD (GFR = 30-44 mL/min/1 73 square meters)    Stage 4 Severe CKD (GFR = 15-29 mL/min/1 73 square meters)    Stage 5 End Stage CKD (GFR <15 mL/min/1 73 square meters)  Note: GFR calculation is accurate only with a steady state creatinine    Magnesium [[id number]]  (Normal) Collected:  09/21/20 2233    Lab Status:  Final result Specimen:  Blood from Arm, Right Updated:  09/21/20 2259     Magnesium 2 2 mg/dL     Protime-INR [[id number]]  (Abnormal) Collected:  09/21/20 2233    Lab Status:  Final result Specimen:  Blood from Arm, Right Updated:  09/21/20 2258     Protime 70 0 seconds      INR 8 77    APTT [[id number]]  (Abnormal) Collected:  09/21/20 2233    Lab Status:  Final result Specimen:  Blood from Arm, Right Updated:  09/21/20 2258     PTT 87 seconds     Procalcitonin with AM Reflex [[id number]] Collected:  09/21/20 2233    Lab Status: In process Specimen:  Blood from Arm, Right Updated:  09/21/20 2239    Blood culture #1 [[id number]] Collected:  09/21/20 2233    Lab Status: In process Specimen:  Blood from Arm, Right Updated:  09/21/20 2238    Blood culture #2 [[id number]] Collected:  09/21/20 2233    Lab Status: In process Specimen:  Blood from Arm, Right Updated:  09/21/20 2238                 CT chest abdomen pelvis wo contrast   Final Result by Ernestine Gonzalez MD (09/22 2500)      Limited examination secondary to lack of intravenous contrast   No definite source of infection identified within the thorax, abdomen or pelvis  No acute thoracic or abdominopelvic disease         Minimal soft tissue gas noted the level of the right thoracic inlet, which may be iatrogenic  Correlate clinically  Additional findings as above  Workstation performed: [id number]         TRAUMA - CT head wo contrast   Final Result by Mayur Jacob MD (09/22 0018)      No acute intracranial abnormality  Workstation performed: [id number]         TRAUMA - CT spine cervical wo contrast   Final Result by Mayur Jacob MD (09/22 0008)      No cervical spine fracture or traumatic malalignment  Workstation performed: [id number]         XR chest portable    (Results Pending)              Procedures  Procedures         ED Course  ED Course as of Sep 22 0635   Mon Sep 21, 2020   2349 Patient's labs reviewed, INR of 8 77  No active bleeding currently, will give patient vitamin K  Patient now mildly hypotensive, will initiate fluid bolus and CT scan patient  SBIRT 22yo+      Most Recent Value   SBIRT (22 yo +)   In order to provide better care to our patients, we are screening all of our patients for alcohol and drug use  Would it be okay to ask you these screening questions?   Unable to answer at this time Filed at: 09/21/2020 2239                  MDM    Disposition  Final diagnoses:   Supratherapeutic INR   Altered mental status   Hypernatremia   Chronic combined systolic and diastolic congestive heart failure ([facility] [address] )     Time reflects when diagnosis was documented in both MDM as applicable and the Disposition within this note     Time User Action Codes Description Comment    9/22/2020  1:07 AM Carmelia Brock Add [R79 1] Supratherapeutic INR     9/22/2020  1:07 AM Carmelia Brock Add [R41 82] Altered mental status     9/22/2020  1:07 AM Carmelia Brock Add [E87 0] Hypernatremia     9/22/2020  5:38 AM Ankit You Add [I50 42] Chronic combined systolic and diastolic congestive heart failure [facility])       ED Disposition     ED Disposition Condition Date/Time Comment    Admit Stable Tue Sep 22, 2020  2:35 AM Case was discussed with Chelly Polo and the patient's admission status was agreed to be Admission Status: inpatient status to the service of Dr Margareth Marti   Follow-up Information    None         Current Discharge Medication List      CONTINUE these medications which have NOT CHANGED    Details   acetaminophen (TYLENOL) 325 mg tablet Take 650 mg by mouth every 6 (six) hours as needed for mild pain      ALPRAZolam (XANAX) 0 25 mg tablet Take 1 tablet (0 25 mg total) by mouth 3 (three) times a day as needed for anxiety (30 min prior to meals) for up to 10 days  Qty: 30 tablet, Refills: 0    Associated Diagnoses: Anxiety; Oropharyngeal dysphagia      aspirin (ECOTRIN LOW STRENGTH) 81 mg EC tablet Take 81 mg by mouth daily      atorvastatin (LIPITOR) 10 mg tablet Take 10 mg by mouth daily      cyanocobalamin (VITAMIN B-12) 1000 MCG tablet Take 1 tablet (1,000 mcg total) by mouth daily  Qty: 30 tablet, Refills: 0    Associated Diagnoses: Vitamin B12 deficiency      docusate sodium (COLACE) 100 mg capsule Take 1 capsule (100 mg total) by mouth 2 (two) times a day  Qty: 10 capsule, Refills: 0    Associated Diagnoses: Constipation, unspecified constipation type      enoxaparin (LOVENOX) 60 mg/0 6 mL Inject 0 6 mL (60 mg total) under the skin every 12 (twelve) hours Once INR reach to 2, please discontinue Lovenox  Continue only Coumadin  Qty: 12 mL, Refills: 0    Associated Diagnoses: Permanent atrial fibrillation ([address] ); Left ventricular apical thrombus      ergocalciferol (VITAMIN D2) 50,000 units Take 50,000 Units by mouth once a week TUESDAY      gabapentin (NEURONTIN) 100 mg capsule Take 100 mg by mouth daily at bedtime      levothyroxine 50 mcg tablet Take 50 mcg by mouth daily      metoprolol tartrate (LOPRESSOR) 25 mg tablet Take 1 tablet (25 mg total) by mouth every morning  Qty: 30 tablet, Refills: 0    Associated Diagnoses: Permanent atrial fibrillation ([address] );  Left ventricular apical thrombus      mirtazapine (REMERON) 30 mg tablet Take 1 tablet (30 mg total) by mouth daily at bedtime  Qty: 30 tablet, Refills: 0    Associated Diagnoses: Failure to thrive in adult; Oropharyngeal dysphagia; Protein-calorie malnutrition, severe (HCC)      nitroglycerin (NITROSTAT) 0 4 mg SL tablet Place 0 4 mg under the tongue every 5 (five) minutes as needed for chest pain      pantoprazole (PROTONIX) 40 mg tablet Take 40 mg by mouth daily      potassium chloride (MICRO-K) 10 MEQ CR capsule Take 10 mEq by mouth 2 (two) times a day      QUEtiapine (SEROquel) 50 mg tablet Take 50 mg by mouth daily at bedtime      ramipril (ALTACE) 10 MG capsule Take 10 mg by mouth daily      rOPINIRole (REQUIP) 0 5 mg tablet Take 0 5 mg by mouth daily at bedtime      sertraline (ZOLOFT) 25 mg tablet Take 25 mg by mouth every morning      sucralfate (CARAFATE) 1 g tablet Take 1 g by mouth 2 (two) times a day      tamsulosin (FLOMAX) 0 4 mg Take 0 4 mg by mouth daily with dinner      warfarin (COUMADIN) 7 5 mg tablet Target INR is 2-3  Once INR reached to 2, please discontinue Lovenox   (lovenox is using for Bridging purpose)  Qty: 10 tablet, Refills: 0    Associated Diagnoses: Permanent atrial fibrillation ([address] ); Left ventricular apical thrombus           No discharge procedures on file      PDMP Review       Value Time User    PDMP Reviewed  Yes 7/9/2020 12:33 PM Leandro Acosta MD          ED Provider  Electronically Signed by           Arabella Sagastume DO  09/22/20 2702

## 2020-09-22 NOTE — NURSING NOTE
Dr Esa Angulo aware of blood sugar of 72 and patient refusing medications and breakfast and lunch tray

## 2020-09-22 NOTE — ASSESSMENT & PLAN NOTE
Wt Readings from Last 3 Encounters:   09/22/20 50 3 kg (110 lb 12 8 oz)   07/09/20 56 6 kg (124 lb 12 5 oz)       · Does not appear volume overloaded  · Last echocardiogram 06/29/2020:  EF:  35-40% with G3DD  · CXR:  Pacemaker in place  No evidence of effusion or consolidation    Radiology read is pending  · Low-salt diet  · Daily weights and I&Os  · Continue metoprolol

## 2020-09-22 NOTE — ASSESSMENT & PLAN NOTE
· EKG: NSR rate of 72  · Rate controlled with metoprolol  · Anticoagulated with Coumadin-INR today: 8 77  · Received 5 mg vitamin K orally in the ER  · Hold Coumadin today  · Daily PT INR

## 2020-09-22 NOTE — ASSESSMENT & PLAN NOTE
Lab Results   Component Value Date    HGBA1C 5 2 08/17/2020       Recent Labs     09/22/20  0747 09/22/20  1053   POCGLU 83 72       Blood Sugar Average: Last 72 hrs:  (P) 77 5   · No concentrated sweets diet with thin liquids  · Fingerstick blood sugar 4 times daily-will hold off on sliding scale until glucose trended-does not appear to be on diabetic medication at the nursing home

## 2020-09-22 NOTE — OCCUPATIONAL THERAPY NOTE
Occupational Therapy Cancellation Note      Patient Name: Sandra Foster  UUIOH'H Date: 9/22/2020      OT orders received  Chart review completed  Pt's INR is currently above therapeutic level, 9 4  Will hold OT services at this time  Will continue to follow and address as medically appropriate and as schedule allows       FAVIAN Caldwell/JUAN F

## 2020-09-22 NOTE — ASSESSMENT & PLAN NOTE
· Platelets 82   His recent baseline has been around 100-110  · Was started on B12 last admission-continue this  · Daily CBC and trend platelets  · Monitor for bleeding

## 2020-09-22 NOTE — SOCIAL WORK
Current LOS 1 day  CM consult for Post Acute Placement  Pt is known to [address] [place] [address] from previous admission on 06/27-07/09/20  Pt was admitted then from [facility] d/t dehydration  Pt was recommended for STR at time of d/c and was d/c to [facility]  CM contacted [facility]  Spoke to Joshua Thompson ([phone])  CM confirmed with Amina that pt returned to their facility from [facility] on 08/28/20  Per Joshua Thompson, at baseline pt can stand and ambulate from bed to the bathroom and back  Pt requires assistance with ADLs  Pt is oppositional and will often refuse to do things for himself  Pt is normally AAOx4  Pt is on a puree diet  Does not have dentures (refuses)  Pt came to [address] [place] [address] again d/t refusing to eat  Pt continues to state he cannot swallow despite testing during last admission to [address] [place] [address] and at [facility] showing no evidence of medical reason that pt cannot swallow  It is all related d/t pt anxiety  Joshua Thompson states pt cannot return to the facility until he is agreeing to eat  Joshua Thompson understands that there is no guarantee that pt will ever agree and even if he does that they will not continue to have the same issues as before  CM discussed additional options with Amina Thompson states possible SNF or family could provide additional staff (private pay) to assist with pt care at the [facility]  CM asked that Joshua Thompson contact the family to discuss those options prior to CM contacting them for d/c planning  Per Joshua Thompson, their attending Dr Suzette Hernández has in the past spoken to pt family about the options for a feeding tube  CM will contact son after care team rounds to discuss d/c planning

## 2020-09-22 NOTE — ED NOTES
Patient refused to take shirt off and change into a gown     Gurvinder Nixon, PAULINA  09/22/20 3951

## 2020-09-22 NOTE — NURSING NOTE
Patient bladder scanned for 387  Refusing straight catheterization at this time  Urinal placed and encouraged to void

## 2020-09-22 NOTE — SPEECH THERAPY NOTE
Speech-Language Pathology Bedside Swallow Evaluation    Patient Name: Kyara Chino    MWSANDI Date: 9/22/2020     Problem List  Principal Problem:    Hypernatremia  Active Problems:    Permanent atrial fibrillation ([facility] )    Type 2 diabetes mellitus with diabetic neuropathy, without long-term current use of insulin (HCC)    Bipolar 1 disorder, depressed (HCC)    Chronic combined systolic and diastolic congestive heart failure (HCC)    Left ventricular apical thrombus    Chronic idiopathic thrombocytopenia (HCC)    LISA (acute kidney injury) ([place] [address] )    Normocytic anemia      Past Medical History  Past Medical History:   Diagnosis Date    A-fib ([place] [address] )     Anemia     Bipolar 1 disorder ([place] [address] )     Cardiomegaly     Depression     Diabetes mellitus ([place] [address] )     Disease of thyroid gland     Epigastric pain     MI (myocardial infarction) ([place] [address] )     Panic disorder (episodic paroxysmal anxiety)     SVT (supraventricular tachycardia) ([place] [address] )     Urinary retention     Vertigo        Summary  Assessment was limited by pt refusal, however he did take a few sips of water and bites of pudding before full refusal  Pt presented with s/s suggestive of mild-moderate oropharyngeal dysphagia  Symptoms or concerns included decreased bolus acceptance, decreased bolus propulsion and suspected decreased control of liquids, as well as suspected pharyngeal swallow delay, suspected decreased hyolaryngeal elevation upon palpation and multiple swallows  Suspect dry mucosa resulting in residual throughout oropharynx, likely cause of multiple swallow response  Pt refused majority of drinks but few small sips taken yielded no s/s aspiration  Suspect behavioral component of pt's dysphagia  He is on a regular diet at baseline, and suspect downgrading diet to puree would lead to additional refusals  Spoke to RN, recommend continuing regular diet as tolerated, encourage intake and downgrade diet if s/s aspiration are observed   ST will continue to follow  Risk/s for Aspiration: suspect low, may be increased now due to lethargy    Recommended Diet: regular diet and thin liquids   Recommended Form of Meds: as tolerated   Aspiration precautions and swallowing strategies: upright posture, only feed when fully alert, slow rate of feeding, small bites/sips and alternating bites and sips      Current Medical Status per Heidi Luciano's H&P 9/22/2020  Alejandro Leo is a 68 y o  male with history of AFib on Coumadin, bipolar 1 disorder, non-insulin-dependent type 2 diabetes, hypothyroidism, CAD, urinary retention, LV thrombus, pacemaker, combined systolic and diastolic CHF, anemia, and chronic idiopathic thrombocytopenia who presents with decreased responsiveness at nursing home and not eating  The patient is nonverbal and unable to participate review of systems  Sodium in the ER found to be 155  INR was supratherapeutic at 8, patient received 5 mg of vitamin K orally  Current Precautions:  Fall, Aspiration, Contact      Special Studies:  CXR 9/21/2020 IMPRESSION:  No acute cardiopulmonary disease  CT chest 9/22/2020 LUNGS: Lungs are clear  There is no tracheal or endobronchial lesion  Social/Education/Vocational Hx:  Pt lives in SNF/ECF    Swallow Information   Current Risks for Dysphagia & Aspiration: AMS  Current Symptoms/Concerns: pt is refusing to eat  Current Diet: regular diet and thin liquids   Baseline Diet: regular diet and thin liquids      Baseline Assessment   Behavior/Cognition: alert  Speech/Language Status: able to follow commands inconsistently  Patient Positioning: upright in bed  Pain Status/Interventions/Response to Interventions:  No report of or nonverbal indications of pain         Swallow Mechanism Exam  Facial: masked facies and slight droop on L but leaning to left  Labial: decreased strength  Lingual: decreased coordination  Velum: unable to visualize  Mandible:  decreased ROM  Dentition: edentulous  Vocal quality:weak Consistencies Assessed and Performance   Consistencies Administered: thin liquids and puree  Materials administered included sips of water via stp x4, via straw x1  Bites of pudding x4    Oral Stage: mild-mod, decreased bolus acceptance, decreased bolus propulsion and suspected decreased control of thin liquids     Pharyngeal Stage: mild-moderate, suspected pharyngeal swallow delay, suspected decreased hyolaryngeal elevation upon palpation and multiple swallows  No coughing, throat clearing, change in vocal quality or respiratory status noted today  Esophageal Concerns: none reported      Summary and Recommendations (see above)    Results Reviewed with: patient and RN     Treatment Recommended: yes    Frequency of treatment: 2-3x/week    Patient Stated Goal: "Just let me alone"    Dysphagia LTG  -Patient will demonstrate safe and effective oral intake (without overt s/s significant oral/pharyngeal dysphagia including s/s penetration or aspiration) for the highest appropriate diet level  Short Term Goals:  -Pt will tolerate the least restrictive diet and least restrictive liquid consistency with no significant s/s oral or pharyngeal dysphagia across 1-3 diagnostic sessions

## 2020-09-22 NOTE — PROGRESS NOTES
Progress Note - Kai Kechi 1940, 68 y o  male MRN: [id number]    Unit/Bed#: -01 Encounter: [id number]    Primary Care Provider: Heydi Metz MD   Date and time admitted to hospital: 9/21/2020  9:53 PM    * Hypernatremia  Assessment & Plan  · Improving with hypotonic fluid  · Poor oral intake    Severe protein-calorie malnutrition [place]: less than 60% of standard weight) ([facility])  Assessment & Plan  Malnutrition Findings:   Malnutrition type: Chronic illness(related to depression/fear of eating, decreased appetite as evidenced by 23 1% wt loss x 1 year (10/1/19 143#, 9/22/20 110#), severe muscle wasting to temporalis, deltoid, pectoralis, interroseous, quadriceps muscles, severe fat loss at orbitals, triceps)  Degree of Malnutrition: Other severe protein calorie malnutrition(Treated with: Recommend psychiatric evaluation  Will liberalize diet to regular with 4 gm JOSHUA, fluid restriction per MD  Dysphagia diet per RN/ST  Will trial ensure clear supplementation BID due to previous fear of milk products  Multivitamin supplement)    BMI Findings:  BMI Classifications: Underweight < 18 5     Body mass index is 15 9 kg/m²  Patient refusing food  Check calorie count  Discussed goal of care with family    LISA (acute kidney injury) ([facility] )  Assessment & Plan  · Improved with hydration    Permanent atrial fibrillation ([facility] )  Assessment & Plan  · Warfarin on hold for supratherapeutic INR    Chronic idiopathic thrombocytopenia (HCC)  Assessment & Plan  · Platelets 82   His recent baseline has been around 100-110  · Was started on B12 last admission-continue this  · Daily CBC and trend platelets  · Monitor for bleeding    Left ventricular apical thrombus  Assessment & Plan  · Left ventricular thrombus 07/2020  · Will continue anticoagulation once INR normalize    Chronic combined systolic and diastolic congestive heart failure (HCC)  Assessment & Plan  Wt Readings from Last 3 Encounters:   09/22/20 50 3 kg (110 lb 12 8 oz)   20 56 6 kg (124 lb 12 5 oz)     · Does not appear volume overloaded  · Last echocardiogram 2020:  EF:  35-40% with G3DD  · CXR:  Pacemaker in place  No evidence of effusion or consolidation  Radiology read is pending  · Low-salt diet  · Daily weights and I&Os  · Continue metoprolol    Bipolar 1 disorder, depressed ([address] )  Assessment & Plan  · Appears to be at baseline  · Continue home medication including Seroquel, Zoloft, Remeron  · Supportive care    Type 2 diabetes mellitus with diabetic neuropathy, without long-term current use of insulin [facility])  Assessment & Plan  Lab Results   Component Value Date    HGBA1C 5 2 2020     Recent Labs     20  0747 20  1053   POCGLU 83 72     Blood Sugar Average: Last 72 hrs:  (P) 77 5   · No concentrated sweets diet with thin liquids  · Fingerstick blood sugar 4 times daily-will hold off on sliding scale until glucose trended-does not appear to be on diabetic medication at the nursing home    VTE Pharmacologic Prophylaxis:   Pharmacologic: Heparin    Patient Centered Rounds: I have performed bedside rounds with nursing staff today    Discussions with Specialists or Other Care Team Provider:     Education and Discussions with Family / Patient:     Current Length of Stay: 0 day(s)    Current Patient Status: Inpatient   Certification Statement: The patient will continue to require additional inpatient hospital stay due to Hypernatremia, severe protein calorie malnutrition, supratherapeutic INR    Discharge Plan:  Patient will probably stay 3-4 days    Code Status: Level 3 - DNAR and DNI      Subjective:   Patient not verbalizing any complaints  Refused to answer all my questions  Patient refusing medication as well as diet  No events overnight      Objective:     Vitals:   Temp (24hrs), Av 2 °F (36 2 °C), Min:96 2 °F (35 7 °C), Max:97 6 °F (36 4 °C)    Temp:  [96 2 °F (35 7 °C)-97 6 °F (36 4 °C)] 97 6 °F (36 4 °C)  HR:  [61-75] 61  Resp:  [14-22] 16  BP: (100-147)/(56-87) 121/59  SpO2:  [92 %-100 %] 100 %  Body mass index is 15 9 kg/m²  Input and Output Summary (last 24 hours): Intake/Output Summary (Last 24 hours) at 9/22/2020 1549  Last data filed at 9/22/2020 0501  Gross per 24 hour   Intake 1000 ml   Output 800 ml   Net 200 ml       Physical Exam:     General appearance: alert, appears stated age and cooperative  Head: Normocephalic, without obvious abnormality, atraumatic  Lungs: clear to auscultation bilaterally  Heart: regular rate and rhythm  Abdomen: soft, non-tender, positive bowel sounds   Back: negative  Extremities: extremities atraumatic, no cyanosis or edema  Neurologic: Alert and oriented X 3, normal strength and tone  Normal symmetric reflexes  Normal coordination and gait    Additional Data:     Labs:    Results from last 7 days   Lab Units 09/22/20  0517   WBC Thousand/uL 4 85   HEMOGLOBIN g/dL 11 0*   HEMATOCRIT % 34 4*   PLATELETS Thousands/uL 77*   NEUTROS PCT % 74   LYMPHS PCT % 12*   MONOS PCT % 14*   EOS PCT % 0     Results from last 7 days   Lab Units 09/22/20  1411  09/21/20  2233   SODIUM mmol/L 149*   < > 151*   POTASSIUM mmol/L 4 2   < > 4 5   CHLORIDE mmol/L 116*   < > 112*   CO2 mmol/L 24   < > 24   BUN mg/dL 30*   < > 35*   CREATININE mg/dL 1 20   < > 1 46*   ANION GAP mmol/L 9   < > 15*   CALCIUM mg/dL 9 2   < > 9 5   ALBUMIN g/dL  --   --  3 4*   TOTAL BILIRUBIN mg/dL  --   --  0 26   ALK PHOS U/L  --   --  140*   ALT U/L  --   --  17   AST U/L  --   --  25   GLUCOSE RANDOM mg/dL 79   < > 132    < > = values in this interval not displayed  Results from last 7 days   Lab Units 09/22/20  0612   INR  9 40*     Results from last 7 days   Lab Units 09/22/20  1053 09/22/20  0747   POC GLUCOSE mg/dl 72 83         Results from last 7 days   Lab Units 09/21/20  2233   LACTIC ACID mmol/L 1 5   PROCALCITONIN ng/ml 0 09           * I Have Reviewed All Lab Data Listed Above    * Additional Pertinent Lab Tests Reviewed: [facility] 68 Admission Reviewed    Imaging:    Imaging Reports Reviewed Today Include: images reviewed    Recent Cultures (last 7 days):     Results from last 7 days   Lab Units 09/21/20 2233   BLOOD CULTURE  Received in Microbiology Lab  Culture in Progress  Received in Microbiology Lab  Culture in Progress  Last 24 Hours Medication List:   Current Facility-Administered Medications   Medication Dose Route Frequency Provider Last Rate    acetaminophen  650 mg Oral Q6H PRN Mitzy Watts, CRNP      ALPRAZolam  0 25 mg Oral TID PRN Mitzy Watts, CRNP      aspirin  81 mg Oral Daily Mitzy Watts, CRNP      atorvastatin  10 mg Oral Daily With [employer], CRNP      docusate sodium  100 mg Oral BID Mitzy Watts, CRNP      gabapentin  100 mg Oral HS Mitzy Watts, CRNP      levothyroxine  50 mcg Oral Early Morning Mitzy Watts, CRNP      metoprolol tartrate  25 mg Oral QAM Mitzy Watts, CRNP      mirtazapine  30 mg Oral HS Mitzy Watts, CRNP      ondansetron  4 mg Intravenous Q8H PRN Mitzy Watts, CRNP      pantoprazole  40 mg Oral Early Morning Mitzy Watts, CRNP      potassium chloride  10 mEq Oral BID Mitzy Watts, CRNP      QUEtiapine  50 mg Oral HS Mitzy Watts, CRNP      rOPINIRole  0 5 mg Oral HS Mitzy Watts, CRNP      sertraline  25 mg Oral QAM Mitzy Watts, CRNP      sodium chloride  75 mL/hr Intravenous Continuous Cassandra Laureano MD 50 mL/hr (09/22/20 9330)    sucralfate  1 g Oral BID Mitzy Watts, CRNP      tamsulosin  0 4 mg Oral Daily With [employer], CRNP          Today, Patient Was Seen By: Cassandra Laureano MD    ** Please Note: Dictation voice to text software may have been used in the creation of this document   **

## 2020-09-22 NOTE — MALNUTRITION/BMI
This medical record reflects one or more clinical indicators suggestive of malnutrition  Malnutrition Findings:   Malnutrition type: Chronic illness(related to depression/fear of eating, decreased appetite as evidenced by 23 1% wt loss x 1 year (10/1/19 143#, 9/22/20 110#), severe muscle wasting to temporalis, deltoid, pectoralis, interroseous, quadriceps muscles, severe fat loss at orbitals, triceps)  Degree of Malnutrition: Other severe protein calorie malnutrition(Treated with: Recommend psychiatric evaluation  Will liberalize diet to regular with 4 gm JOSHUA, fluid restriction per MD  Dysphagia diet per RN/ST  Will trial ensure clear supplementation BID due to previous fear of milk products  Multivitamin supplement)  Malnutrition Characteristics: Weight loss, Muscle loss, Fat loss    BMI Findings:  BMI Classifications: Underweight < 18 5     Body mass index is 15 9 kg/m²  See Nutrition note dated 9/22/20 for additional details  Completed nutrition assessment is viewable in the nutrition documentation

## 2020-09-22 NOTE — ASSESSMENT & PLAN NOTE
· Appears to be at baseline  · Continue home medication including Seroquel, Zoloft, Remeron  · Supportive care

## 2020-09-22 NOTE — ASSESSMENT & PLAN NOTE
· Sent to hospital [facility] for refusing meals, verbally non-responsive  · Sodium 151  · Free water deficit 2 14 L  · Received 1 L normal saline bolus in the ER  · Recheck metabolic panel  · Half-normal saline at 50 mL an hour  · Recheck metabolic panel at 1:93 p m

## 2020-09-22 NOTE — PLAN OF CARE
Problem: Potential for Falls  Goal: Patient will remain free of falls  Description: INTERVENTIONS:  - Assess patient frequently for physical needs  -  Identify cognitive and physical deficits and behaviors that affect risk of falls    -  [place] fall precautions as indicated by assessment   - Educate patient/family on patient safety including physical limitations  - Instruct patient to call for assistance with activity based on assessment  - Modify environment to reduce risk of injury  - Consider OT/PT consult to assist with strengthening/mobility  Outcome: Progressing     Problem: Prexisting or High Potential for Compromised Skin Integrity  Goal: Skin integrity is maintained or improved  Description: INTERVENTIONS:  - Identify patients at risk for skin breakdown  - Assess and monitor skin integrity  - Assess and monitor nutrition and hydration status  - Monitor labs   - Assess for incontinence   - Turn and reposition patient  - Assist with mobility/ambulation  - Relieve pressure over bony prominences  - Avoid friction and shearing  - Provide appropriate hygiene as needed including keeping skin clean and dry  - Evaluate need for skin moisturizer/barrier cream  - Collaborate with interdisciplinary team   - Patient/family teaching  - Consider wound care consult   Outcome: Progressing     Problem: PAIN - ADULT  Goal: Verbalizes/displays adequate comfort level or baseline comfort level  Description: Interventions:  - Encourage patient to monitor pain and request assistance  - Assess pain using appropriate pain scale  - Administer analgesics based on type and severity of pain and evaluate response  - Implement non-pharmacological measures as appropriate and evaluate response  - Consider cultural and social influences on pain and pain management  - Notify physician/advanced practitioner if interventions unsuccessful or patient reports new pain  Outcome: Progressing     Problem: INFECTION - ADULT  Goal: Absence or prevention of progression during hospitalization  Description: INTERVENTIONS:  - Assess and monitor for signs and symptoms of infection  - Monitor lab/diagnostic results  - Monitor all insertion sites, i e  indwelling lines, tubes, and drains  - Monitor endotracheal if appropriate and nasal secretions for changes in amount and color  - [place] appropriate cooling/warming therapies per order  - Administer medications as ordered  - Instruct and encourage patient and family to use good hand hygiene technique  - Identify and instruct in appropriate isolation precautions for identified infection/condition  Outcome: Progressing  Goal: Absence of fever/infection during neutropenic period  Description: INTERVENTIONS:  - Monitor WBC    Outcome: Progressing     Problem: SAFETY ADULT  Goal: Patient will remain free of falls  Description: INTERVENTIONS:  - Assess patient frequently for physical needs  -  Identify cognitive and physical deficits and behaviors that affect risk of falls    -  [place] fall precautions as indicated by assessment   - Educate patient/family on patient safety including physical limitations  - Instruct patient to call for assistance with activity based on assessment  - Modify environment to reduce risk of injury  - Consider OT/PT consult to assist with strengthening/mobility  Outcome: Progressing  Goal: Maintain or return to baseline ADL function  Description: INTERVENTIONS:  -  Assess patient's ability to carry out ADLs; assess patient's baseline for ADL function and identify physical deficits which impact ability to perform ADLs (bathing, care of mouth/teeth, toileting, grooming, dressing, etc )  - Assess/evaluate cause of self-care deficits   - Assess range of motion  - Assess patient's mobility; develop plan if impaired  - Assess patient's need for assistive devices and provide as appropriate  - Encourage maximum independence but intervene and supervise when necessary  - Involve family in performance of ADLs  - Assess for home care needs following discharge   - Consider OT consult to assist with ADL evaluation and planning for discharge  - Provide patient education as appropriate  Outcome: Progressing  Goal: Maintain or return mobility status to optimal level  Description: INTERVENTIONS:  - Assess patient's baseline mobility status (ambulation, transfers, stairs, etc )    - Identify cognitive and physical deficits and behaviors that affect mobility  - Identify mobility aids required to assist with transfers and/or ambulation (gait belt, sit-to-stand, lift, walker, cane, etc )  - [place] fall precautions as indicated by assessment  - Record patient progress and toleration of activity level on Mobility SBAR; progress patient to next Phase/Stage  - Instruct patient to call for assistance with activity based on assessment  - Consider rehabilitation consult to assist with strengthening/weightbearing, etc   Outcome: Progressing     Problem: DISCHARGE PLANNING  Goal: Discharge to home or other facility with appropriate resources  Description: INTERVENTIONS:  - Identify barriers to discharge w/patient and caregiver  - Arrange for needed discharge resources and transportation as appropriate  - Identify discharge learning needs (meds, wound care, etc )  - Arrange for interpretive services to assist at discharge as needed  - Refer to Case Management Department for coordinating discharge planning if the patient needs post-hospital services based on physician/advanced practitioner order or complex needs related to functional status, cognitive ability, or social support system  Outcome: Progressing     Problem: Knowledge Deficit  Goal: Patient/family/caregiver demonstrates understanding of disease process, treatment plan, medications, and discharge instructions  Description: Complete learning assessment and assess knowledge base    Interventions:  - Provide teaching at level of understanding  - Provide teaching via preferred learning methods  Outcome: Progressing     Problem: Nutrition/Hydration-ADULT  Goal: Nutrient/Hydration intake appropriate for improving, restoring or maintaining nutritional needs  Description: Monitor and assess patient's nutrition/hydration status for malnutrition  Collaborate with interdisciplinary team and initiate plan and interventions as ordered  Monitor patient's weight and dietary intake as ordered or per policy  Utilize nutrition screening tool and intervene as necessary  Determine patient's food preferences and provide high-protein, high-caloric foods as appropriate       INTERVENTIONS:  - Monitor oral intake, urinary output, labs, and treatment plans  - Assess nutrition and hydration status and recommend course of action  - Evaluate amount of meals eaten  - Assist patient with eating if necessary   - Allow adequate time for meals  - Recommend/ encourage appropriate diets, oral nutritional supplements, and vitamin/mineral supplements  - Order, calculate, and assess calorie counts as needed  - Recommend, monitor, and adjust tube feedings and TPN/PPN based on assessed needs  - Assess need for intravenous fluids  - Provide specific nutrition/hydration education as appropriate  - Include patient/family/caregiver in decisions related to nutrition  Outcome: Progressing

## 2020-09-22 NOTE — ASSESSMENT & PLAN NOTE
· Hemoglobin 11 4  His recent baseline has been 9-10    This value elevated above baseline could be due in part to hemoconcentration  · Daily CBC and trend hemoglobin

## 2020-09-22 NOTE — ASSESSMENT & PLAN NOTE
· Creatinine 1 46 with baseline 0 9-1 0 suspect prerenal due to decreased oral intake  · Received 1 L normal saline in the ER  · Recheck metabolic panel, trend creatinine  · Avoid hypotension and nephrotoxins

## 2020-09-23 PROBLEM — R79.1 SUPRATHERAPEUTIC INR: Status: ACTIVE | Noted: 2020-09-23

## 2020-09-23 LAB
ANION GAP SERPL CALCULATED.3IONS-SCNC: 8 MMOL/L (ref 4–13)
ANION GAP SERPL CALCULATED.3IONS-SCNC: 9 MMOL/L (ref 4–13)
BASOPHILS # BLD AUTO: 0.02 THOUSANDS/ΜL (ref 0–0.1)
BASOPHILS NFR BLD AUTO: 0 % (ref 0–1)
BUN SERPL-MCNC: 28 MG/DL (ref 5–25)
BUN SERPL-MCNC: 29 MG/DL (ref 5–25)
CALCIUM SERPL-MCNC: 9.1 MG/DL (ref 8.3–10.1)
CALCIUM SERPL-MCNC: 9.1 MG/DL (ref 8.3–10.1)
CHLORIDE SERPL-SCNC: 115 MMOL/L (ref 100–108)
CHLORIDE SERPL-SCNC: 115 MMOL/L (ref 100–108)
CO2 SERPL-SCNC: 25 MMOL/L (ref 21–32)
CO2 SERPL-SCNC: 25 MMOL/L (ref 21–32)
CREAT SERPL-MCNC: 1.16 MG/DL (ref 0.6–1.3)
CREAT SERPL-MCNC: 1.16 MG/DL (ref 0.6–1.3)
EOSINOPHIL # BLD AUTO: 0.05 THOUSAND/ΜL (ref 0–0.61)
EOSINOPHIL NFR BLD AUTO: 1 % (ref 0–6)
ERYTHROCYTE [DISTWIDTH] IN BLOOD BY AUTOMATED COUNT: 15.9 % (ref 11.6–15.1)
ERYTHROCYTE [DISTWIDTH] IN BLOOD BY AUTOMATED COUNT: 15.9 % (ref 11.6–15.1)
GFR SERPL CREATININE-BSD FRML MDRD: 60 ML/MIN/1.73SQ M
GFR SERPL CREATININE-BSD FRML MDRD: 60 ML/MIN/1.73SQ M
GLUCOSE SERPL-MCNC: 101 MG/DL (ref 65–140)
GLUCOSE SERPL-MCNC: 187 MG/DL (ref 65–140)
GLUCOSE SERPL-MCNC: 56 MG/DL (ref 65–140)
GLUCOSE SERPL-MCNC: 62 MG/DL (ref 65–140)
GLUCOSE SERPL-MCNC: 67 MG/DL (ref 65–140)
GLUCOSE SERPL-MCNC: 73 MG/DL (ref 65–140)
HCT VFR BLD AUTO: 30.8 % (ref 36.5–49.3)
HCT VFR BLD AUTO: 32.2 % (ref 36.5–49.3)
HGB BLD-MCNC: 10.2 G/DL (ref 12–17)
HGB BLD-MCNC: 9.9 G/DL (ref 12–17)
IMM GRANULOCYTES # BLD AUTO: 0.01 THOUSAND/UL (ref 0–0.2)
IMM GRANULOCYTES NFR BLD AUTO: 0 % (ref 0–2)
INR PPP: 4.98 (ref 0.84–1.19)
INR PPP: 5.64 (ref 0.84–1.19)
LYMPHOCYTES # BLD AUTO: 0.89 THOUSANDS/ΜL (ref 0.6–4.47)
LYMPHOCYTES NFR BLD AUTO: 15 % (ref 14–44)
MCH RBC QN AUTO: 32.1 PG (ref 26.8–34.3)
MCH RBC QN AUTO: 32.7 PG (ref 26.8–34.3)
MCHC RBC AUTO-ENTMCNC: 31.7 G/DL (ref 31.4–37.4)
MCHC RBC AUTO-ENTMCNC: 32.1 G/DL (ref 31.4–37.4)
MCV RBC AUTO: 100 FL (ref 82–98)
MCV RBC AUTO: 103 FL (ref 82–98)
MONOCYTES # BLD AUTO: 0.88 THOUSAND/ΜL (ref 0.17–1.22)
MONOCYTES NFR BLD AUTO: 15 % (ref 4–12)
MRSA NOSE QL CULT: NORMAL
NEUTROPHILS # BLD AUTO: 4.07 THOUSANDS/ΜL (ref 1.85–7.62)
NEUTS SEG NFR BLD AUTO: 69 % (ref 43–75)
NRBC BLD AUTO-RTO: 0 /100 WBCS
PLATELET # BLD AUTO: 68 THOUSANDS/UL (ref 149–390)
PLATELET # BLD AUTO: 68 THOUSANDS/UL (ref 149–390)
PMV BLD AUTO: 10.5 FL (ref 8.9–12.7)
PMV BLD AUTO: 11.6 FL (ref 8.9–12.7)
POTASSIUM SERPL-SCNC: 3.9 MMOL/L (ref 3.5–5.3)
POTASSIUM SERPL-SCNC: 4 MMOL/L (ref 3.5–5.3)
PROCALCITONIN SERPL-MCNC: 0.12 NG/ML
PROTHROMBIN TIME: 45.2 SECONDS (ref 11.6–14.5)
PROTHROMBIN TIME: 49.7 SECONDS (ref 11.6–14.5)
RBC # BLD AUTO: 3.08 MILLION/UL (ref 3.88–5.62)
RBC # BLD AUTO: 3.12 MILLION/UL (ref 3.88–5.62)
SODIUM SERPL-SCNC: 148 MMOL/L (ref 136–145)
SODIUM SERPL-SCNC: 149 MMOL/L (ref 136–145)
WBC # BLD AUTO: 5.01 THOUSAND/UL (ref 4.31–10.16)
WBC # BLD AUTO: 5.92 THOUSAND/UL (ref 4.31–10.16)

## 2020-09-23 PROCEDURE — 85027 COMPLETE CBC AUTOMATED: CPT | Performed by: NURSE PRACTITIONER

## 2020-09-23 PROCEDURE — 85025 COMPLETE CBC W/AUTO DIFF WBC: CPT | Performed by: NURSE PRACTITIONER

## 2020-09-23 PROCEDURE — 84145 PROCALCITONIN (PCT): CPT | Performed by: EMERGENCY MEDICINE

## 2020-09-23 PROCEDURE — 85610 PROTHROMBIN TIME: CPT | Performed by: INTERNAL MEDICINE

## 2020-09-23 PROCEDURE — 80048 BASIC METABOLIC PNL TOTAL CA: CPT | Performed by: NURSE PRACTITIONER

## 2020-09-23 PROCEDURE — 85610 PROTHROMBIN TIME: CPT | Performed by: NURSE PRACTITIONER

## 2020-09-23 PROCEDURE — 82948 REAGENT STRIP/BLOOD GLUCOSE: CPT

## 2020-09-23 RX ORDER — PHYTONADIONE 10 MG/ML
1 INJECTION, EMULSION INTRAMUSCULAR; INTRAVENOUS; SUBCUTANEOUS ONCE
Status: COMPLETED | OUTPATIENT
Start: 2020-09-23 | End: 2020-09-23

## 2020-09-23 RX ADMIN — THIAMINE HYDROCHLORIDE 100 MG: 100 INJECTION, SOLUTION INTRAMUSCULAR; INTRAVENOUS at 13:15

## 2020-09-23 RX ADMIN — SODIUM CHLORIDE 75 ML/HR: 0.45 INJECTION, SOLUTION INTRAVENOUS at 12:31

## 2020-09-23 RX ADMIN — PHYTONADIONE 1 MG: 10 INJECTION, EMULSION INTRAMUSCULAR; INTRAVENOUS; SUBCUTANEOUS at 10:55

## 2020-09-23 NOTE — PHYSICAL THERAPY NOTE
PHYSICAL THERAPY REFUSAL NOTE          Patient Name: Tricia Romeo  XPRON'V Date: 9/23/2020     Received order for PT consult  Chart reviewed  Pt's INR decreased to 4 98 this date  Spoke with nursing  Attempted to see patient this AM for PT evaluation, however patient declining stating "No" 3x when offered OOB, use of bathroom and any other needs  Pt lethargic with eyes closing at times during encounter  Will continue to follow and see patient as medically appropriate at a later time       Thera Res, PT,DPT

## 2020-09-23 NOTE — ASSESSMENT & PLAN NOTE
Malnutrition Findings:   Malnutrition type: Chronic illness(related to depression/fear of eating, decreased appetite as evidenced by 23 1% wt loss x 1 year (10/1/19 143#, 9/22/20 110#), severe muscle wasting to temporalis, deltoid, pectoralis, interroseous, quadriceps muscles, severe fat loss at orbitals, triceps)  Degree of Malnutrition: Other severe protein calorie malnutrition(Treated with: Recommend psychiatric evaluation  Will liberalize diet to regular with 4 gm JOSHUA, fluid restriction per MD  Dysphagia diet per RN/ST  Will trial ensure clear supplementation BID due to previous fear of milk products  Multivitamin supplement)    BMI Findings:  BMI Classifications: Underweight < 18 5     Body mass index is 16 97 kg/m²       Patient refusing food  Check calorie count  Discussed goal of care with family  Significant dysphagia, appreciate speech/swallow input  Patient refused for video swallow study (done in 07/2020 and showed no significant aspiration at that time)  Patient may need barium esophagogram/EGD-however patient refused

## 2020-09-23 NOTE — OCCUPATIONAL THERAPY NOTE
Occupational Therapy Refusal        Patient Name: Carlito Painter  LVSIF'G Date: 9/23/2020      OT orders received  Chart review completed  Attempted to see Pt this AM however Pt declining to participate in OT services at this time  Pt encouraged to sit OOB although Pt verbalizing "no"  Pt was asked if he needed to use the bathroom, Pt responded "no"  Pt reports "no" to needed anything else at this time  Will continue to follow Pt and treat as medically appropriate and as schedule allows       FAVIAN Farris/JUAN F

## 2020-09-24 PROBLEM — K13.70 ORAL LESION: Status: ACTIVE | Noted: 2020-09-24

## 2020-09-24 LAB
GLUCOSE SERPL-MCNC: 114 MG/DL (ref 65–140)
GLUCOSE SERPL-MCNC: 124 MG/DL (ref 65–140)
GLUCOSE SERPL-MCNC: 130 MG/DL (ref 65–140)
GLUCOSE SERPL-MCNC: 61 MG/DL (ref 65–140)
GLUCOSE SERPL-MCNC: 66 MG/DL (ref 65–140)
GLUCOSE SERPL-MCNC: 77 MG/DL (ref 65–140)
GLUCOSE SERPL-MCNC: 82 MG/DL (ref 65–140)
INR PPP: 1.8 (ref 0.84–1.19)
PROTHROMBIN TIME: 20.5 SECONDS (ref 11.6–14.5)

## 2020-09-24 PROCEDURE — 99233 SBSQ HOSP IP/OBS HIGH 50: CPT | Performed by: INTERNAL MEDICINE

## 2020-09-24 PROCEDURE — 82948 REAGENT STRIP/BLOOD GLUCOSE: CPT

## 2020-09-24 PROCEDURE — 85610 PROTHROMBIN TIME: CPT | Performed by: INTERNAL MEDICINE

## 2020-09-24 RX ORDER — WARFARIN SODIUM 5 MG/1
5 TABLET ORAL
Status: DISCONTINUED | OUTPATIENT
Start: 2020-09-24 | End: 2020-09-24

## 2020-09-24 RX ORDER — OXYMETAZOLINE HYDROCHLORIDE 0.05 G/100ML
2 SPRAY NASAL EVERY 12 HOURS SCHEDULED
Status: DISCONTINUED | OUTPATIENT
Start: 2020-09-24 | End: 2020-09-25 | Stop reason: HOSPADM

## 2020-09-24 RX ORDER — SODIUM CHLORIDE/ALOE VERA
1 GEL (GRAM) NASAL 3 TIMES DAILY
Status: DISCONTINUED | OUTPATIENT
Start: 2020-09-24 | End: 2020-09-25 | Stop reason: HOSPADM

## 2020-09-24 RX ORDER — DEXTROSE AND SODIUM CHLORIDE 5; .45 G/100ML; G/100ML
75 INJECTION, SOLUTION INTRAVENOUS CONTINUOUS
Status: DISCONTINUED | OUTPATIENT
Start: 2020-09-24 | End: 2020-09-25 | Stop reason: HOSPADM

## 2020-09-24 RX ADMIN — THIAMINE HYDROCHLORIDE 100 MG: 100 INJECTION, SOLUTION INTRAMUSCULAR; INTRAVENOUS at 10:44

## 2020-09-24 RX ADMIN — Medication 1 APPLICATION: at 18:06

## 2020-09-24 RX ADMIN — DEXTROSE AND SODIUM CHLORIDE 75 ML/HR: 5; .45 INJECTION, SOLUTION INTRAVENOUS at 17:06

## 2020-09-24 RX ADMIN — DEXTROSE AND SODIUM CHLORIDE 75 ML/HR: 5; .45 INJECTION, SOLUTION INTRAVENOUS at 01:04

## 2020-09-24 NOTE — ASSESSMENT & PLAN NOTE
Oral lesion on the left hard palate  Multiple crusted hematoma around  Patient presented with supratherapeutic INR (on Coumadin); had nose bleeding  ENT evaluation  Hold anticoagulation

## 2020-09-24 NOTE — SOCIAL WORK
Current LOS 3 days  CM completed chart review  Pt case discussed with attending Dr Adam Hung had a goals of care discussion with pt son Otto Parra was agreeable to comfort care/hospice however he wanted to discuss this with his siblings prior to making a final decision  CM contacted [facility] and spoke to [address]  Velvet Huynh of possible comfort care/hospice  CM inquired if facility could take pt back under hospice care  [address] states pt could be accepted back to their facility under hospice  They have a contract with [facility]  CM contacted pt son Otto Parra to discuss family wishes for comfort care/hospice  CM discussed hospice at home vs hospice at [facility] vs hospice at [facility]  CM left detailed voicemail requesting a call back  CM will continue to follow pt medical course and assist with d/c planning as needed

## 2020-09-24 NOTE — SOCIAL WORK
CM received call from pt son Rosalindawesley Dominguez  Sara Dominguez will be here at the hospital at 1:15 today  Son requesting to meet with attending doctor and CM to discuss d/c planning  CM notified Dr Gregoria Fernandes

## 2020-09-24 NOTE — SOCIAL WORK
CM and Dr Esa Angulo met with pt son Juan Chua  Explained goals of care and hospice services  Juan Chua is agreeable to initiate a hospice referral for d/c back to [place] once medically stable  Juan Ceciliaadriana is agreeable to use [facility] and they have a contract with [facility] referral sent

## 2020-09-24 NOTE — PROGRESS NOTES
Progress Note - Jacquie Drilling 1940, 68 y o  male MRN: [id number]    Unit/Bed#: -01 Encounter: [id number]    Primary Care Provider: Dedra Serrano MD   Date and time admitted to hospital: 9/21/2020  9:53 PM     * Hypernatremia  Assessment & Plan  · Improving with hypotonic fluid  · Poor oral intake    Oral lesion  Assessment & Plan  Oral lesion on the left hard palate  Multiple crusted hematoma around  Patient presented with supratherapeutic INR (on Coumadin); had nose bleeding  ENT evaluation  Hold anticoagulation    Severe protein-calorie malnutrition Bigg Rubalcava: less than 60% of standard weight) (HCC)  Assessment & Plan  Malnutrition Findings:   Malnutrition type: Chronic illness(related to depression/fear of eating, decreased appetite as evidenced by 23 1% wt loss x 1 year (10/1/19 143#, 9/22/20 110#), severe muscle wasting to temporalis, deltoid, pectoralis, interroseous, quadriceps muscles, severe fat loss at orbitals, triceps)  Degree of Malnutrition: Other severe protein calorie malnutrition(Treated with: Recommend psychiatric evaluation  Will liberalize diet to regular with 4 gm JOSHUA, fluid restriction per MD  Dysphagia diet per RN/ST  Will trial ensure clear supplementation BID due to previous fear of milk products  Multivitamin supplement)    BMI Findings:  BMI Classifications: Underweight < 18 5     Body mass index is 16 97 kg/m²       Patient refusing food  Check calorie count  Discussed goal of care with family  Significant dysphagia, appreciate speech/swallow input  Patient refused for video swallow study (done in 07/2020 and showed no significant aspiration at that time)  Patient may need barium esophagogram/EGD-however patient refused    Permanent atrial fibrillation ([facility] [address] )  Assessment & Plan  · Warfarin on hold for supratherapeutic INR    LISA (acute kidney injury) ([facility] )  Assessment & Plan  · Improved with hydration    Supratherapeutic INR  Assessment & Plan  INR normalized now    Normocytic anemia  Assessment & Plan  · Hemoglobin 11 4  His recent baseline has been 9-10  This value elevated above baseline could be due in part to hemoconcentration  · Daily CBC and trend hemoglobin    Chronic idiopathic thrombocytopenia (HCC)  Assessment & Plan  · Platelets 82  His recent baseline has been around 100-110  · Was started on B12 last admission-continue this  · Daily CBC and trend platelets  · Monitor for bleeding    Left ventricular apical thrombus  Assessment & Plan  · Left ventricular thrombus 07/2020  · Will continue anticoagulation once INR normalize    Chronic combined systolic and diastolic congestive heart failure (HCC)  Assessment & Plan  Wt Readings from Last 3 Encounters:   09/24/20 54 3 kg (119 lb 12 8 oz)   07/09/20 56 6 kg (124 lb 12 5 oz)     · Does not appear volume overloaded  · Last echocardiogram 06/29/2020:  EF:  35-40% with G3DD  · CXR:  Pacemaker in place  No evidence of effusion or consolidation    Radiology read is pending  · Low-salt diet  · Daily weights and I&Os  · Continue metoprolol    Bipolar 1 disorder, depressed ([facility] [address] )  Assessment & Plan  · Appears to be at baseline  · Continue home medication including Seroquel, Zoloft, Remeron  · Supportive care    Type 2 diabetes mellitus with diabetic neuropathy, without long-term current use of insulin [facility])  Assessment & Plan  Lab Results   Component Value Date    HGBA1C 5 2 08/17/2020     Recent Labs     09/24/20  0026 09/24/20  0316 09/24/20  0736 09/24/20  1048   POCGLU 66 77 82 114     Blood Sugar Average: Last 72 hrs:  (P) 85 [id number]   · No concentrated sweets diet with thin liquids  · Fingerstick blood sugar 4 times daily-will hold off on sliding scale until glucose trended-does not appear to be on diabetic medication at the nursing home    VTE Pharmacologic Prophylaxis:   Pharmacologic: Anticoagulation deferred due to bleeding    Patient Centered Rounds: I have performed bedside rounds with nursing staff today    Discussions with Specialists or Other Care Team Provider:     Education and Discussions with Family / Patient:     Current Length of Stay: 2 day(s)    Current Patient Status: Inpatient   Certification Statement: The patient will continue to require additional inpatient hospital stay due to Poor oral intake    Discharge Plan:  Probably in the next 2-3 days    Code Status: Level 3 - DNAR and DNI      Subjective:   Complaining of dysphagia    Objective:     Vitals:   Temp (24hrs), Av 9 °F (36 6 °C), Min:97 °F (36 1 °C), Max:98 4 °F (36 9 °C)    Temp:  [97 °F (36 1 °C)-98 4 °F (36 9 °C)] 97 °F (36 1 °C)  HR:  [58-72] 67  Resp:  [18-20] 20  BP: (131-154)/(62-80) 131/62  SpO2:  [99 %-100 %] 100 %  Body mass index is 17 19 kg/m²  Input and Output Summary (last 24 hours): Intake/Output Summary (Last 24 hours) at 2020 1213  Last data filed at 2020 1052  Gross per 24 hour   Intake 940 ml   Output 226 ml   Net 714 ml       Physical Exam:     General appearance: alert, appears stated age and cooperative  Head: Crest hematoma in the oral cavity, there is bluish oral lesion attach to the hard palate  Lungs: clear to auscultation bilaterally  Heart: regular rate and rhythm  Abdomen: soft, non-tender, positive bowel sounds   Back: negative  Extremities: extremities atraumatic, no cyanosis or edema  Neurologic: Alert and oriented X 3, normal strength and tone  Normal symmetric reflexes   Normal coordination and gait    Additional Data:     Labs:    Results from last 7 days   Lab Units 20  0459   WBC Thousand/uL 5 92   HEMOGLOBIN g/dL 10 2*   HEMATOCRIT % 32 2*   PLATELETS Thousands/uL 68*   NEUTROS PCT % 69   LYMPHS PCT % 15   MONOS PCT % 15*   EOS PCT % 1     Results from last 7 days   Lab Units 20  0510  20  2233   SODIUM mmol/L 148*   < > 151*   POTASSIUM mmol/L 4 0   < > 4 5   CHLORIDE mmol/L 115*   < > 112*   CO2 mmol/L 25   < > 24   BUN mg/dL 28*   < > 35*   CREATININE mg/dL 1 16   < > 1 46*   ANION GAP mmol/L 8   < > 15*   CALCIUM mg/dL 9 1   < > 9 5   ALBUMIN g/dL  --   --  3 4*   TOTAL BILIRUBIN mg/dL  --   --  0 26   ALK PHOS U/L  --   --  140*   ALT U/L  --   --  17   AST U/L  --   --  25   GLUCOSE RANDOM mg/dL 67   < > 132    < > = values in this interval not displayed  Results from last 7 days   Lab Units 09/24/20  0528   INR  1 80*     Results from last 7 days   Lab Units 09/24/20  1048 09/24/20  0736 09/24/20  0316 09/24/20  0026 09/23/20  2048 09/23/20  1611 09/23/20  1104 09/23/20  0751 09/23/20  0656 09/22/20  2042 09/22/20  1607 09/22/20  1053   POC GLUCOSE mg/dl 114 82 77 66 62* 61* 101 187* 56* 74 74 72         Results from last 7 days   Lab Units 09/23/20  0510 09/21/20  2233   LACTIC ACID mmol/L  --  1 5   PROCALCITONIN ng/ml 0 12 0 09           * I Have Reviewed All Lab Data Listed Above  * Additional Pertinent Lab Tests Reviewed: Karin 68 Admission Reviewed    Imaging:    Imaging Reports Reviewed Today Include: images reviewed    Recent Cultures (last 7 days):     Results from last 7 days   Lab Units 09/21/20  2233   BLOOD CULTURE  No Growth at 48 hrs  No Growth at 48 hrs         Last 24 Hours Medication List:   Current Facility-Administered Medications   Medication Dose Route Frequency Provider Last Rate    acetaminophen  650 mg Oral Q6H PRN Chana Stain, CRNP      ALPRAZolam  0 25 mg Oral TID PRN Chana Stain, CRNP      atorvastatin  10 mg Oral Daily With Dinner Chana Stain, CRNP      dextrose 5 % and sodium chloride 0 45 %  75 mL/hr Intravenous Continuous Fatimah S Dhruv, CRNP 75 mL/hr (09/24/20 0104)    docusate sodium  100 mg Oral BID Chana Stain, CRNP      gabapentin  100 mg Oral HS Chana Stain, CRNP      levothyroxine  50 mcg Oral Early Morning Chana Stain, CRNP      metoprolol tartrate  25 mg Oral QAM Chana Stain, CRNP      mirtazapine  30 mg Oral HS Chana Stain, CRNP      ondansetron  4 mg Intravenous Q8H PRN Jaspal Franco, CRNP      pantoprazole  40 mg Oral Early Morning Jaspal Franco, CRNP      potassium chloride  10 mEq Oral BID Jaspal Franco, CRNP      QUEtiapine  50 mg Oral HS Jaspal Franco, CRNP      rOPINIRole  0 5 mg Oral HS Jaspal Duffs, CRNP      sertraline  25 mg Oral QAM Too Jordyjose daniel Boyd, CRNP      sucralfate  1 g Oral BID Jaspal Franco, CRNP      tamsulosin  0 4 mg Oral Daily With Dinner Jaspal Franco, ODETTENP      thiamine  100 mg Intravenous Daily Jhony Bowens  mg (09/24/20 1045)        Today, Patient Was Seen By: Jhony Bowens MD    ** Please Note: Dictation voice to text software may have been used in the creation of this document   **

## 2020-09-24 NOTE — CONSULTS
Consultation - ENT   Gian Johns 68 y o  male MRN: [id number]  Unit/Bed#: -01 Encounter: [id number]      Assessment/Plan     Patient with hard palate ulceration and infiltration, possible piriform sinus ulceration which may be related, biopsy recommended but patient not amenable    Epistaxis will require switching nasal prongs to facemask and using afrin and ayr nasal gel    If patient changed mind, please notify me as this can be performed in the outpatiet setting if necessary    Consider CT neck if patient agrees to proceed    Patient should consider feeding tube or alternative to oral feeding for maintenance and optimization of his nutritional status  History of Present Illness   Physician Requesting Consult: Chad An MD  Reason for Consult / Principal Problem: palate lesion  HPI: Gian Johns is a 68y o  year old male who presents with hard palate lesion  Patient is a very poor historian and states he has not been able to eat for a very long time  Numerous interventions have been suggested to the patient, he has refused  He notes occasional bleeding in his mouth although not active and crusting all his nose with nasal prongs are placed  Consults    Review of Systems    Historical Information   Past Medical History:   Diagnosis Date    A-fib ([facility] )     Anemia     Bipolar 1 disorder ([facility] )     Cardiomegaly     Depression     Diabetes mellitus ([facility] )     Disease of thyroid gland     Epigastric pain     [facility]     Panic disorder (episodic paroxysmal anxiety)     [facility]     Urinary retention     Vertigo      History reviewed  No pertinent surgical history    Social History   Social History     Substance and Sexual Activity   Alcohol Use Never    Frequency: Never    Binge frequency: Never     Social History     Substance and Sexual Activity   Drug Use Never     E-Cigarette/Vaping    E-Cigarette Use Never User E-Cigarette/Vaping Substances    Nicotine No     THC No     CBD No     Flavoring No     Other No     Unknown No      Social History     Tobacco Use   Smoking Status Never Smoker   Smokeless Tobacco Never Used     Family History: non-contributory    Meds/Allergies   all current active meds have been reviewed    No Known Allergies    Objective       Intake/Output Summary (Last 24 hours) at 9/24/2020 1645  Last data filed at 9/24/2020 1538  Gross per 24 hour   Intake 940 ml   Output 576 ml   Net 364 ml       Invasive Devices     Peripheral Intravenous Line            Peripheral IV 09/21/20 Right Antecubital 2 days                Physical Exam     Ears:  Mild cerumen is seen but tympanic membranes are visualized and found to be neutral without effusion  Nasal exam:  Large amount of crusting was seen around his nasal prongs left greater than right with a deviated septum and endoscopy showed no source of bleeding that were active  Oral cavity:  There is a 3 cm area of excoriation along the hard palate into the junction of the soft palate in the midline and right of midline with numerous crusts  of blood which is very tender  Oropharynx:  Be on the palate, the pharynx is clear and the base tongue shows no obvious lesions  NPL exam:  True vocal folds are mobile and there is some ulceration along the arytenoid and piriform sinus on the right without airway impingement  There are no obvious lesions of the ulcerations are extending into a fold right side  Neck:  There is no palpable adenopathy  Lab Results: I have personally reviewed pertinent lab results  Imaging Studies: I have personally reviewed pertinent reports  Code Status: Level 3 - DNAR and DNI  Advance Directive and Living Will:      Power of :    POLST:      Counseling/Coordination of Care: Total floor / unit time spent today 30 minutes   Greater than 50% of total time was spent with the patient and / or family counseling and / or coordination of care   A description of the counseling / coordination of care: Presurgical discussion, examination and planning

## 2020-09-24 NOTE — ASSESSMENT & PLAN NOTE
Wt Readings from Last 3 Encounters:   09/24/20 54 3 kg (119 lb 12 8 oz)   07/09/20 56 6 kg (124 lb 12 5 oz)     · Does not appear volume overloaded  · Last echocardiogram 06/29/2020:  EF:  35-40% with G3DD  · CXR:  Pacemaker in place  No evidence of effusion or consolidation    Radiology read is pending  · Low-salt diet  · Daily weights and I&Os  · Continue metoprolol

## 2020-09-24 NOTE — ASSESSMENT & PLAN NOTE
Lab Results   Component Value Date    HGBA1C 5 2 08/17/2020       Recent Labs     09/24/20  0026 09/24/20  0316 09/24/20  0736 09/24/20  1048   POCGLU 66 77 82 114       Blood Sugar Average: Last 72 hrs:  (P) 85 [id number]   · No concentrated sweets diet with thin liquids  · Fingerstick blood sugar 4 times daily-will hold off on sliding scale until glucose trended-does not appear to be on diabetic medication at the nursing home

## 2020-09-24 NOTE — PLAN OF CARE
Problem: Potential for Falls  Goal: Patient will remain free of falls  Description: INTERVENTIONS:  - Assess patient frequently for physical needs  -  Identify cognitive and physical deficits and behaviors that affect risk of falls    -  [place] fall precautions as indicated by assessment   - Educate patient/family on patient safety including physical limitations  - Instruct patient to call for assistance with activity based on assessment  - Modify environment to reduce risk of injury  - Consider OT/PT consult to assist with strengthening/mobility  Outcome: Progressing     Problem: Prexisting or High Potential for Compromised Skin Integrity  Goal: Skin integrity is maintained or improved  Description: INTERVENTIONS:  - Identify patients at risk for skin breakdown  - Assess and monitor skin integrity  - Assess and monitor nutrition and hydration status  - Monitor labs   - Assess for incontinence   - Turn and reposition patient  - Assist with mobility/ambulation  - Relieve pressure over bony prominences  - Avoid friction and shearing  - Provide appropriate hygiene as needed including keeping skin clean and dry  - Evaluate need for skin moisturizer/barrier cream  - Collaborate with interdisciplinary team   - Patient/family teaching  - Consider wound care consult   Outcome: Progressing     Problem: PAIN - ADULT  Goal: Verbalizes/displays adequate comfort level or baseline comfort level  Description: Interventions:  - Encourage patient to monitor pain and request assistance  - Assess pain using appropriate pain scale  - Administer analgesics based on type and severity of pain and evaluate response  - Implement non-pharmacological measures as appropriate and evaluate response  - Consider cultural and social influences on pain and pain management  - Notify physician/advanced practitioner if interventions unsuccessful or patient reports new pain  Outcome: Progressing     Problem: INFECTION - ADULT  Goal: Absence or prevention of progression during hospitalization  Description: INTERVENTIONS:  - Assess and monitor for signs and symptoms of infection  - Monitor lab/diagnostic results  - Monitor all insertion sites, i e  indwelling lines, tubes, and drains  - Monitor endotracheal if appropriate and nasal secretions for changes in amount and color  - [place] appropriate cooling/warming therapies per order  - Administer medications as ordered  - Instruct and encourage patient and family to use good hand hygiene technique  - Identify and instruct in appropriate isolation precautions for identified infection/condition  Outcome: Progressing  Goal: Absence of fever/infection during neutropenic period  Description: INTERVENTIONS:  - Monitor WBC    Outcome: Progressing     Problem: SAFETY ADULT  Goal: Patient will remain free of falls  Description: INTERVENTIONS:  - Assess patient frequently for physical needs  -  Identify cognitive and physical deficits and behaviors that affect risk of falls    -  [place] fall precautions as indicated by assessment   - Educate patient/family on patient safety including physical limitations  - Instruct patient to call for assistance with activity based on assessment  - Modify environment to reduce risk of injury  - Consider OT/PT consult to assist with strengthening/mobility  Outcome: Progressing  Goal: Maintain or return to baseline ADL function  Description: INTERVENTIONS:  -  Assess patient's ability to carry out ADLs; assess patient's baseline for ADL function and identify physical deficits which impact ability to perform ADLs (bathing, care of mouth/teeth, toileting, grooming, dressing, etc )  - Assess/evaluate cause of self-care deficits   - Assess range of motion  - Assess patient's mobility; develop plan if impaired  - Assess patient's need for assistive devices and provide as appropriate  - Encourage maximum independence but intervene and supervise when necessary  - Involve family in performance of ADLs  - Assess for home care needs following discharge   - Consider OT consult to assist with ADL evaluation and planning for discharge  - Provide patient education as appropriate  Outcome: Progressing  Goal: Maintain or return mobility status to optimal level  Description: INTERVENTIONS:  - Assess patient's baseline mobility status (ambulation, transfers, stairs, etc )    - Identify cognitive and physical deficits and behaviors that affect mobility  - Identify mobility aids required to assist with transfers and/or ambulation (gait belt, sit-to-stand, lift, walker, cane, etc )  - [place] fall precautions as indicated by assessment  - Record patient progress and toleration of activity level on Mobility SBAR; progress patient to next Phase/Stage  - Instruct patient to call for assistance with activity based on assessment  - Consider rehabilitation consult to assist with strengthening/weightbearing, etc   Outcome: Progressing     Problem: DISCHARGE PLANNING  Goal: Discharge to home or other facility with appropriate resources  Description: INTERVENTIONS:  - Identify barriers to discharge w/patient and caregiver  - Arrange for needed discharge resources and transportation as appropriate  - Identify discharge learning needs (meds, wound care, etc )  - Arrange for interpretive services to assist at discharge as needed  - Refer to Case Management Department for coordinating discharge planning if the patient needs post-hospital services based on physician/advanced practitioner order or complex needs related to functional status, cognitive ability, or social support system  Outcome: Progressing     Problem: Knowledge Deficit  Goal: Patient/family/caregiver demonstrates understanding of disease process, treatment plan, medications, and discharge instructions  Description: Complete learning assessment and assess knowledge base    Interventions:  - Provide teaching at level of understanding  - Provide teaching via preferred learning methods  Outcome: Progressing

## 2020-09-24 NOTE — NURSING NOTE
Patient awake alert and oriented with periods of confusion  Lungs clear diminished on room air  Positive bowel sounds, no edema present, 18 right ac with fluids infusing  Patient denies pain at present  Old blood noted on note and inside mouth  Refusing to eat and take pills at this time

## 2020-09-24 NOTE — NURSING NOTE
Pt refused night time meds and shift assessment for this RN  Pt states he want to be "left alone" and told this RN "not to bother" him  Pt has call bell on lap when this RN left room

## 2020-09-25 VITALS
DIASTOLIC BLOOD PRESSURE: 63 MMHG | RESPIRATION RATE: 20 BRPM | TEMPERATURE: 97.5 F | HEART RATE: 67 BPM | WEIGHT: 119.2 LBS | BODY MASS INDEX: 17.07 KG/M2 | HEIGHT: 70 IN | SYSTOLIC BLOOD PRESSURE: 116 MMHG | OXYGEN SATURATION: 99 %

## 2020-09-25 PROBLEM — Z51.5 COMFORT MEASURES ONLY STATUS: Status: ACTIVE | Noted: 2020-09-25

## 2020-09-25 LAB — GLUCOSE SERPL-MCNC: 117 MG/DL (ref 65–140)

## 2020-09-25 PROCEDURE — 99239 HOSP IP/OBS DSCHRG MGMT >30: CPT | Performed by: INTERNAL MEDICINE

## 2020-09-25 PROCEDURE — 82948 REAGENT STRIP/BLOOD GLUCOSE: CPT

## 2020-09-25 RX ORDER — LORAZEPAM 0.5 MG/1
1 TABLET ORAL EVERY 8 HOURS PRN
Qty: 8 TABLET | Refills: 0 | Status: SHIPPED | OUTPATIENT
Start: 2020-09-25 | End: 2020-10-05

## 2020-09-25 RX ORDER — OXYCODONE HYDROCHLORIDE 5 MG/1
5 TABLET ORAL EVERY 4 HOURS PRN
Qty: 6 TABLET | Refills: 0 | Status: SHIPPED | OUTPATIENT
Start: 2020-09-25

## 2020-09-25 RX ORDER — POLYETHYLENE GLYCOL 3350 17 G/17G
17 POWDER, FOR SOLUTION ORAL DAILY PRN
Refills: 0
Start: 2020-09-25

## 2020-09-25 RX ADMIN — Medication 1 APPLICATION: at 08:33

## 2020-09-25 RX ADMIN — THIAMINE HYDROCHLORIDE 100 MG: 100 INJECTION, SOLUTION INTRAMUSCULAR; INTRAVENOUS at 09:02

## 2020-09-25 RX ADMIN — DEXTROSE AND SODIUM CHLORIDE 75 ML/HR: 5; .45 INJECTION, SOLUTION INTRAVENOUS at 05:05

## 2020-09-25 NOTE — NURSING NOTE
Patient refused all po meds this am  He also refused to eat  I attempted to do mouth care, but he only let me briefly swab his mouth with the oral debrieder  He did not allow me to fully clean off all of the dried blood in his mouth, or from his nares

## 2020-09-25 NOTE — ASSESSMENT & PLAN NOTE
Severe dysphagia and refusal to eat  Goal of care discussed with patient and patient's next of kin  Patient barely eats, complaining dysphagia  Refuses for artificial feeding tube  Hospice care referral -reviewed and accepted  Family meeting, goal of care discussion and POLST form completed before discharge  Ulcerative oral region; appreciate ENT evaluation and suggested biopsy however patient refused  Medication list revised for discharge

## 2020-09-25 NOTE — TRANSPORTATION MEDICAL NECESSITY
Section I - General Information    Name of Patient: Sanchez Zimmerman                 : 1940    Medicare #: [id number]  Transport Date: 20 (PCS is valid for round trips on this date and for all repetitive trips in the 60-day range as noted below )  Origin: [facility]                                                         Destination: [facility]  Is the pt's stay covered under Medicare Part A (PPS/DRG)   [x]     Closest appropriate facility? If no, why is transport to more distant facility required? Yes  If hospice pt, is this transport related to pt's terminal illness? NA       Section II - Medical Necessity Questionnaire  Ambulance transportation is medically necessary only if other means of transport are contraindicated or would be potentially harmful to the patient  To meet this requirement, the patient must either be "bed confined" or suffer from a condition such that transport by means other than ambulance is contraindicated by the patient's condition  The following questions must be answered by the medical professional signing below for this form to be valid:    1)  Describe the MEDICAL CONDITION (physical and/or mental) of this patient AT [address] [place] [address] that requires the patient to be transported in an ambulance and why transport by other means is contraindicated by the patient's condition: O2 (2L NC), comfort care, non ambulatory     2) Is the patient "bed confined" as defined below? Yes  To be "be confined" the patient must satisfy all three of the following conditions: (1) unable to get up from bed without Assistance; AND (2) unable to ambulate; AND (3) unable to sit in a chair or wheelchair  3) Can this patient safely be transported by car or wheelchair van (i e , seated during transport without a medical attendant or monitoring)?    No    4) In addition to completing questions 1-3 above, please check any of the following conditions that apply*:   *Note: supporting documentation for any boxes checked must be maintained in the patient's medical records  If hosp-hosp transfer, describe services needed at 2nd facility not available at 1st facility? Patient is confused  Medical attendant required   Requires oxygen-unable to self administer  Unable to tolerate seated position for time needed to transport   Other(specify) Comfort care       Section III - Signature of Physician or Healthcare Professional  I certify that the above information is true and correct based on my evaluation of this patient, and represent that the patient requires transport by ambulance and that other forms of transport are contraindicated  I understand that this information will be used by the Centers for Medicare and Medicaid Services (CMS) to support the determination of medical necessity for ambulance services, and I represent that I have personal knowledge of the patient's condition at time of transport  []  If this box is checked, I also certify that the patient is physically or mentally incapable of signing the ambulance service's claim and that the institution with which I am affiliated has furnished care, services, or assistance to the patient  My signature below is made on behalf of the patient pursuant to 42 CFR §424 36(b)(4)  In accordance with 42 CFR §424 37, the specific reason(s) that the patient is physically or mentally incapable of signing the claim form is as follows: Magda Najera of Physician* or Healthcare Professional______________________________________________________________  Signature Date 09/25/20 (For scheduled repetitive transports, this form is not valid for transports performed more than 60 days after this date)    Printed Name & Credentials of Physician or Healthcare Professional (MD, DO, RN, etc ) Tamra Jerome, MSW, LSW     *Form must be signed by patient's attending physician for scheduled, repetitive transports   For non-repetitive, unscheduled ambulance transports, if unable to obtain the signature of the attending physician, any of the following may sign (choose appropriate option below)  [] Physician Assistant []  Clinical Nurse Specialist []  Registered Nurse  []  Nurse Practitioner  [x] Discharge Planner

## 2020-09-25 NOTE — SOCIAL WORK
Current LOS 3 days  CM completed chart review  Pt case discussed with attending Dr Esa Angulo  Pt continues to refuse care/medications/meals  Pt is comfort care and stable for d/c back to [facility] later today  CM spoke with Diaz at [facility]  She states she spoke to [facility] to held determine what equipment pt may need  All equipment will be delivered between 2:30-3:00pm      CM lvm with pt son Juan Chua in regards to pt be transferred back to [facility] today  CM spoke to Felix Scott at [facility] to advise that pt will be returning today with anticipated p/u time of 4pm  CM requested Felix Scott send a copy of pt POA/LW to added to chart  CM contacted [facility], spoke to [place], requested BLS transport for 4pm    CM received tiger text from [address] will be picked up at 6 pm by [facility]  CMN form completed and on pt chart for transport crew  CM notified attending Dr Esa Angulo, bedside nurse Phu An, pt, pt family and facility of transfer time  CM will remain available until d/c

## 2020-09-25 NOTE — DISCHARGE SUMMARY
Discharge- Nayeli Moralez 1940, 68 y o  male MRN: [id number]    Unit/Bed#: -01 Encounter: [id number]    Primary Care Provider: Fermin Tian MD   Date and time admitted to hospital: 9/21/2020  9:53 PM    Comfort measures only status  Assessment & Plan  Severe dysphagia and refusal to eat  Goal of care discussed with patient and patient's next of kin  Patient barely eats, complaining dysphagia  Refuses for artificial feeding tube  Hospice care referral - reviewed and accepted  Family meeting, goal of care discussion and POLST form completed before discharge  Ulcerative oral region; appreciate ENT evaluation and suggested biopsy however patient refused  Continue oral care, bowel regimen as needed and nasal oxygen as necessary  Medication list revised for discharge    Discharging Physician / Practitioner: Karoline Do MD  PCP: Fermin Tian MD  Admission Date:   Admission Orders (From admission, onward)     Ordered        09/22/20 0236  Inpatient Admission  Once                   Discharge Date: 09/25/20    Disposition:      Other: Rehab    For Discharges to   [facility] SN[facility]:   · Not Applicable to this Patient - Not Applicable to this Patient    Reason for Admission:  Dehydration    Discharge Diagnoses:     Please see assessment and plan section above for further details regarding discharge diagnoses  Resolved Problems  Date Reviewed: 9/22/2020    None          Consultations During Hospital Stay:  IP CONSULT TO NUTRITION SERVICES  IP CONSULT TO CASE MANAGEMENT  IP CONSULT TO ENT     Procedures Performed:   * No surgery found *      Ct Chest Abdomen Pelvis Wo Contrast    Result Date: 9/22/2020  Narrative: CT CHEST, ABDOMEN AND PELVIS WITHOUT IV CONTRAST INDICATION:   Sepsis  COMPARISON:  None   TECHNIQUE: CT examination of the chest, abdomen and pelvis was performed without intravenous contrast   Axial, sagittal, and coronal 2D reformatted images were created from the source data and submitted for interpretation  Radiation dose length product (DLP) for this visit:  604 mGy-cm   This examination, like all CT scans performed in the [facility], was performed utilizing techniques to minimize radiation dose exposure, including the use of iterative reconstruction and automated exposure control  Enteric contrast was not administered  FINDINGS: CHEST LUNGS:  Lungs are clear  There is no tracheal or endobronchial lesion  PLEURA:  Unremarkable  HEART/GREAT VESSELS:  Atherosclerotic changes are noted in thoracic aorta and coronary arteries  MEDIASTINUM AND DEANA:  Unremarkable  CHEST WALL AND LOWER NECK:   Left chest wall PPM   Median sternotomy wires are noted  Small foci of soft tissue gas noted at the level of the right thoracic inlet, nonstress  ABDOMEN LIVER/BILIARY TREE:  Unremarkable  GALLBLADDER:  No calcified gallstones  No pericholecystic inflammatory change  SPLEEN:  Unremarkable  PANCREAS:  Unremarkable  ADRENAL GLANDS:  Unremarkable  KIDNEYS/URETERS:  Mildly atrophic  No hydronephrosis  STOMACH AND BOWEL:  Unremarkable  APPENDIX:  No findings to suggest appendicitis  ABDOMINOPELVIC CAVITY:  No ascites  No pneumoperitoneum  No lymphadenopathy  VESSELS:  Atherosclerotic changes are present  No evidence of aneurysm  PELVIS REPRODUCTIVE ORGANS:  Penile calcifications are noted, nonspecific  URINARY BLADDER:  Distended otherwise unremarkable  ABDOMINAL WALL/INGUINAL REGIONS:  Surgical clips are seen in the bilateral inguinal region  OSSEOUS STRUCTURES:  No acute fracture or destructive osseous lesion  Multilevel degenerative changes of the spine are seen  Impression: Limited examination secondary to lack of intravenous contrast   No definite source of infection identified within the thorax, abdomen or pelvis  No acute thoracic or abdominopelvic disease  Minimal soft tissue gas noted the level of the right thoracic inlet, which may be iatrogenic  Correlate clinically  Additional findings as above  Workstation performed: [id number]     Xr Chest Portable    Result Date: 9/22/2020  Narrative: CHEST INDICATION:   Altered mental status  COMPARISON:  06/27/2020 EXAM PERFORMED/VIEWS:  XR CHEST PORTABLE Images: 2 FINDINGS: Cardiomediastinal silhouette appears unremarkable  A median sternotomy has been performed  A defibrillator enters on the left  The pulmonary vessels are normal  The lungs are clear  The lungs appear hyperinflated  No pneumothorax or pleural effusion  Osseous structures appear within normal limits for patient age  Impression: No acute cardiopulmonary disease  Workstation performed: [id number]     Trauma - Ct Head Wo Contrast    Result Date: 9/22/2020  Narrative: CT BRAIN - WITHOUT CONTRAST INDICATION:   Altered mental status Status post fall on warfarin  COMPARISON:  None  TECHNIQUE:  CT examination of the brain was performed  In addition to axial images, sagittal and coronal 2D reformatted images were created and submitted for interpretation  Radiation dose length product (DLP) for this visit:  943 mGy-cm   This examination, like all CT scans performed in the [facility], was performed utilizing techniques to minimize radiation dose exposure, including the use of iterative reconstruction and automated exposure control  IMAGE QUALITY:  Diagnostic  FINDINGS: PARENCHYMA: Decreased attenuation is noted in periventricular and subcortical white matter demonstrating an appearance that is statistically most likely to represent moderate microangiopathic change  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  No CT signs of acute infarction  No intracranial mass, mass effect or midline shift  No acute parenchymal hemorrhage  VENTRICLES AND EXTRA-AXIAL SPACES:  Normal for the patient's age  VISUALIZED ORBITS AND PARANASAL SINUSES:  Unremarkable  CALVARIUM AND EXTRACRANIAL SOFT TISSUES:  Normal      Impression: No acute intracranial abnormality  Workstation performed: [id number]     Trauma - Ct Spine Cervical Wo Contrast    Result Date: 9/22/2020  Narrative: CT CERVICAL SPINE - WITHOUT CONTRAST INDICATION:   Status post fall  COMPARISON:  None  TECHNIQUE:  CT examination of the cervical spine was performed without intravenous contrast   Contiguous axial images were obtained  Sagittal and coronal reconstructions were performed  Radiation dose length product (DLP) for this visit:  304 mGy-cm   This examination, like all CT scans performed in the [facility], was performed utilizing techniques to minimize radiation dose exposure, including the use of iterative reconstruction and automated exposure control  IMAGE QUALITY:  Diagnostic  FINDINGS: ALIGNMENT:  Normal alignment of the cervical spine  No subluxation  VERTEBRAL BODIES:  No fracture  DEGENERATIVE CHANGES:  Mild multilevel cervical degenerative changes are noted without critical central canal stenosis  PREVERTEBRAL AND PARASPINAL SOFT TISSUES:  Unremarkable  THORACIC INLET:  Normal      Impression: No cervical spine fracture or traumatic malalignment  Workstation performed: [id number]        Medication Adjustments and Discharge Medications:  · Summary of Medication Adjustments made as a result of this hospitalization:  None  · Medication Dosing Tapers - Please refer to Discharge Medication List for details on any medication dosing tapers (if applicable to patient)  · Discharge Medication List: See after visit summary for reconciled discharge medications  Wound Care Recommendations:  When applicable, please see wound care section of After Visit Summary  Diet Recommendations at Discharge:  Diet -        Diet Orders   (From admission, onward)             Start     Ordered    09/23/20 1233  Diet Regular; Regular House;  Sodium 4 GM (JOSHUA)  Diet effective now     Question Answer Comment   Diet Type Regular    Regular Regular House    Other Restriction(s): Sodium 4 GM (JOSHUA)    RD to adjust diet per protocol? Yes        09/23/20 1232    09/22/20 1317  Dietary nutrition supplements  Once     Question Answer Comment   Select Supplement: Ensure Clear Berry    Frequency Dinner        09/22/20 1316    09/22/20 1317  Dietary nutrition supplements  Once     Question Answer Comment   Select Supplement: Ensure Clear Apple    Frequency Breakfast        09/22/20 1316                  Incidental Findings:   · None     Test Results Pending at Discharge (will require follow up): · None         Hospital Course:     Kyara Chino is a 68 y o  male patient who originally presented to the hospital on 9/21/2020 due to severe dehydration, fatigue, refusal to eat, supratherapeutic INR on Coumadin  Patient with significant past medical history of AFib on Coumadin, left ventricular thrombus, severe protein calorie malnutrition, poor oral intake presents to us with significant dehydration and refusal to eat  Patient rehydrated, INR followed  Patient had epistaxis and oral mucosa bleeding  ENT evaluated patient for ulcerative lesion over the left side of the hard palate; biopsy recommended however patient refused  Goal of care discussed with family during family meeting, details of POLST form discussed and family agreed for comfort care measures only  Hospice care referral made and accepted  Medication list revised before discharge      Condition at Discharge: stable     Discharge Day Visit / Exam:     Subjective:  No events  Vitals: Blood Pressure: 116/63 (09/25/20 1116)  Pulse: 67 (09/25/20 1116)  Temperature: 97 5 °F (36 4 °C) (09/25/20 1116)  Temp Source: Axillary (09/23/20 2216)  Respirations: 20 (09/25/20 1116)  Height: 5' 10" (177 8 cm) (09/22/20 1252)  Weight - Scale: 54 1 kg (119 lb 3 2 oz) (09/25/20 0600)  SpO2: 99 % (09/25/20 1116)  Exam:     General appearance: alert, appears stated age and cooperative  Head: Normocephalic, without obvious abnormality, atraumatic  Lungs: clear to auscultation bilaterally  Heart: regular rate and rhythm  Abdomen: soft, non-tender, positive bowel sounds   Back: negative  Extremities: extremities atraumatic, no cyanosis or edema  Neurologic: Alert and oriented X 3, normal strength and tone  Normal symmetric reflexes  Normal coordination and gait      Discharge instructions/Information to patient and family:   See after visit summary section titled Discharge Instructions for information provided to patient and family  Planned Readmission:  No      Discharge Statement:  I spent 35 minutes discharging the patient  This time was spent on the day of discharge  I had direct contact with the patient on the day of discharge  Greater than 50% of the total time was spent examining patient, answering all patient questions, arranging and discussing plan of care with patient as well as directly providing post-discharge instructions  Additional time then spent on discharge activities      ** Please Note: This note has been constructed using a voice recognition system **

## 2020-09-25 NOTE — PLAN OF CARE
Problem: Potential for Falls  Goal: Patient will remain free of falls  Description: INTERVENTIONS:  - Assess patient frequently for physical needs  -  Identify cognitive and physical deficits and behaviors that affect risk of falls    -  [place] fall precautions as indicated by assessment   - Educate patient/family on patient safety including physical limitations  - Instruct patient to call for assistance with activity based on assessment  - Modify environment to reduce risk of injury  - Consider OT/PT consult to assist with strengthening/mobility  Outcome: Progressing     Problem: Prexisting or High Potential for Compromised Skin Integrity  Goal: Skin integrity is maintained or improved  Description: INTERVENTIONS:  - Identify patients at risk for skin breakdown  - Assess and monitor skin integrity  - Assess and monitor nutrition and hydration status  - Monitor labs   - Assess for incontinence   - Turn and reposition patient  - Assist with mobility/ambulation  - Relieve pressure over bony prominences  - Avoid friction and shearing  - Provide appropriate hygiene as needed including keeping skin clean and dry  - Evaluate need for skin moisturizer/barrier cream  - Collaborate with interdisciplinary team   - Patient/family teaching  - Consider wound care consult   Outcome: Progressing     Problem: PAIN - ADULT  Goal: Verbalizes/displays adequate comfort level or baseline comfort level  Description: Interventions:  - Encourage patient to monitor pain and request assistance  - Assess pain using appropriate pain scale  - Administer analgesics based on type and severity of pain and evaluate response  - Implement non-pharmacological measures as appropriate and evaluate response  - Consider cultural and social influences on pain and pain management  - Notify physician/advanced practitioner if interventions unsuccessful or patient reports new pain  Outcome: Progressing     Problem: INFECTION - ADULT  Goal: Absence or prevention of progression during hospitalization  Description: INTERVENTIONS:  - Assess and monitor for signs and symptoms of infection  - Monitor lab/diagnostic results  - Monitor all insertion sites, i e  indwelling lines, tubes, and drains  - Monitor endotracheal if appropriate and nasal secretions for changes in amount and color  - [place] appropriate cooling/warming therapies per order  - Administer medications as ordered  - Instruct and encourage patient and family to use good hand hygiene technique  - Identify and instruct in appropriate isolation precautions for identified infection/condition  Outcome: Progressing     Problem: SAFETY ADULT  Goal: Patient will remain free of falls  Description: INTERVENTIONS:  - Assess patient frequently for physical needs  -  Identify cognitive and physical deficits and behaviors that affect risk of falls    -  [place] fall precautions as indicated by assessment   - Educate patient/family on patient safety including physical limitations  - Instruct patient to call for assistance with activity based on assessment  - Modify environment to reduce risk of injury  - Consider OT/PT consult to assist with strengthening/mobility  Outcome: Progressing  Goal: Maintain or return to baseline ADL function  Description: INTERVENTIONS:  -  Assess patient's ability to carry out ADLs; assess patient's baseline for ADL function and identify physical deficits which impact ability to perform ADLs (bathing, care of mouth/teeth, toileting, grooming, dressing, etc )  - Assess/evaluate cause of self-care deficits   - Assess range of motion  - Assess patient's mobility; develop plan if impaired  - Assess patient's need for assistive devices and provide as appropriate  - Encourage maximum independence but intervene and supervise when necessary  - Involve family in performance of ADLs  - Assess for home care needs following discharge   - Consider OT consult to assist with ADL evaluation and planning for discharge  - Provide patient education as appropriate  Outcome: Progressing  Goal: Maintain or return mobility status to optimal level  Description: INTERVENTIONS:  - Assess patient's baseline mobility status (ambulation, transfers, stairs, etc )    - Identify cognitive and physical deficits and behaviors that affect mobility  - Identify mobility aids required to assist with transfers and/or ambulation (gait belt, sit-to-stand, lift, walker, cane, etc )  - [place] fall precautions as indicated by assessment  - Record patient progress and toleration of activity level on Mobility SBAR; progress patient to next Phase/Stage  - Instruct patient to call for assistance with activity based on assessment  - Consider rehabilitation consult to assist with strengthening/weightbearing, etc   Outcome: Progressing     Problem: DISCHARGE PLANNING  Goal: Discharge to home or other facility with appropriate resources  Description: INTERVENTIONS:  - Identify barriers to discharge w/patient and caregiver  - Arrange for needed discharge resources and transportation as appropriate  - Identify discharge learning needs (meds, wound care, etc )  - Arrange for interpretive services to assist at discharge as needed  - Refer to Case Management Department for coordinating discharge planning if the patient needs post-hospital services based on physician/advanced practitioner order or complex needs related to functional status, cognitive ability, or social support system  Outcome: Progressing     Problem: Knowledge Deficit  Goal: Patient/family/caregiver demonstrates understanding of disease process, treatment plan, medications, and discharge instructions  Description: Complete learning assessment and assess knowledge base    Interventions:  - Provide teaching at level of understanding  - Provide teaching via preferred learning methods  Outcome: Progressing     Problem: Nutrition/Hydration-ADULT  Goal: Nutrient/Hydration intake appropriate for improving, restoring or maintaining nutritional needs  Description: Monitor and assess patient's nutrition/hydration status for malnutrition  Collaborate with interdisciplinary team and initiate plan and interventions as ordered  Monitor patient's weight and dietary intake as ordered or per policy  Utilize nutrition screening tool and intervene as necessary  Determine patient's food preferences and provide high-protein, high-caloric foods as appropriate       INTERVENTIONS:  - Monitor oral intake, urinary output, labs, and treatment plans  - Assess nutrition and hydration status and recommend course of action  - Evaluate amount of meals eaten  - Assist patient with eating if necessary   - Allow adequate time for meals  - Recommend/ encourage appropriate diets, oral nutritional supplements, and vitamin/mineral supplements  - Order, calculate, and assess calorie counts as needed  - Recommend, monitor, and adjust tube feedings and TPN/PPN based on assessed needs  - Assess need for intravenous fluids  - Provide specific nutrition/hydration education as appropriate  - Include patient/family/caregiver in decisions related to nutrition  Outcome: Progressing

## 2020-09-25 NOTE — NURSING NOTE
Pt bladder scanned at 0250  Scanned for 362ml, voided 350ml, PVR 83ml  Noticed small amount of blood post void coming from urinary meatus  Fatimah Bartlett (SLIM) made aware  Will continue to monitor

## 2020-09-27 LAB
BACTERIA BLD CULT: NORMAL
BACTERIA BLD CULT: NORMAL

## 2021-08-19 NOTE — PHYSICAL THERAPY NOTE
PHYSICAL THERAPY CANCELLATION NOTE          Patient Name: Michaelle Bates  QDERG'G Date: 9/22/2020     Received order for PT consult  Chart reviewed  Patient with elevated INR increased from 8 77 to 9 40 this date  Will hold PT services this date due to elevated INR to unsafe level for participation in PT services  Will continue to follow and see patient as medically appropriate at a later time       Cayla Antonio, PT,DPT No

## 2023-01-01 NOTE — PLAN OF CARE
Problem: Potential for Falls  Goal: Patient will remain free of falls  Description: INTERVENTIONS:  - Assess patient frequently for physical needs  -  Identify cognitive and physical deficits and behaviors that affect risk of falls    -  [place] fall precautions as indicated by assessment   - Educate patient/family on patient safety including physical limitations  - Instruct patient to call for assistance with activity based on assessment  - Modify environment to reduce risk of injury  - Consider OT/PT consult to assist with strengthening/mobility  Outcome: Progressing     Problem: Prexisting or High Potential for Compromised Skin Integrity  Goal: Skin integrity is maintained or improved  Description: INTERVENTIONS:  - Identify patients at risk for skin breakdown  - Assess and monitor skin integrity  - Assess and monitor nutrition and hydration status  - Monitor labs   - Assess for incontinence   - Turn and reposition patient  - Assist with mobility/ambulation  - Relieve pressure over bony prominences  - Avoid friction and shearing  - Provide appropriate hygiene as needed including keeping skin clean and dry  - Evaluate need for skin moisturizer/barrier cream  - Collaborate with interdisciplinary team   - Patient/family teaching  - Consider wound care consult   Outcome: Progressing     Problem: PAIN - ADULT  Goal: Verbalizes/displays adequate comfort level or baseline comfort level  Description: Interventions:  - Encourage patient to monitor pain and request assistance  - Assess pain using appropriate pain scale  - Administer analgesics based on type and severity of pain and evaluate response  - Implement non-pharmacological measures as appropriate and evaluate response  - Consider cultural and social influences on pain and pain management  - Notify physician/advanced practitioner if interventions unsuccessful or patient reports new pain  Outcome: Progressing     Problem: INFECTION - ADULT  Goal: Absence or prevention of progression during hospitalization  Description: INTERVENTIONS:  - Assess and monitor for signs and symptoms of infection  - Monitor lab/diagnostic results  - Monitor all insertion sites, i e  indwelling lines, tubes, and drains  - Monitor endotracheal if appropriate and nasal secretions for changes in amount and color  - [place] appropriate cooling/warming therapies per order  - Administer medications as ordered  - Instruct and encourage patient and family to use good hand hygiene technique  - Identify and instruct in appropriate isolation precautions for identified infection/condition  Outcome: Progressing  Goal: Absence of fever/infection during neutropenic period  Description: INTERVENTIONS:  - Monitor WBC    Outcome: Progressing     Problem: SAFETY ADULT  Goal: Patient will remain free of falls  Description: INTERVENTIONS:  - Assess patient frequently for physical needs  -  Identify cognitive and physical deficits and behaviors that affect risk of falls    -  [place] fall precautions as indicated by assessment   - Educate patient/family on patient safety including physical limitations  - Instruct patient to call for assistance with activity based on assessment  - Modify environment to reduce risk of injury  - Consider OT/PT consult to assist with strengthening/mobility  Outcome: Progressing  Goal: Maintain or return to baseline ADL function  Description: INTERVENTIONS:  -  Assess patient's ability to carry out ADLs; assess patient's baseline for ADL function and identify physical deficits which impact ability to perform ADLs (bathing, care of mouth/teeth, toileting, grooming, dressing, etc )  - Assess/evaluate cause of self-care deficits   - Assess range of motion  - Assess patient's mobility; develop plan if impaired  - Assess patient's need for assistive devices and provide as appropriate  - Encourage maximum independence but intervene and supervise when necessary  - Involve family in performance of ADLs  - Assess for home care needs following discharge   - Consider OT consult to assist with ADL evaluation and planning for discharge  - Provide patient education as appropriate  Outcome: Progressing  Goal: Maintain or return mobility status to optimal level  Description: INTERVENTIONS:  - Assess patient's baseline mobility status (ambulation, transfers, stairs, etc )    - Identify cognitive and physical deficits and behaviors that affect mobility  - Identify mobility aids required to assist with transfers and/or ambulation (gait belt, sit-to-stand, lift, walker, cane, etc )  - [place] fall precautions as indicated by assessment  - Record patient progress and toleration of activity level on Mobility SBAR; progress patient to next Phase/Stage  - Instruct patient to call for assistance with activity based on assessment  - Consider rehabilitation consult to assist with strengthening/weightbearing, etc   Outcome: Progressing     Problem: DISCHARGE PLANNING  Goal: Discharge to home or other facility with appropriate resources  Description: INTERVENTIONS:  - Identify barriers to discharge w/patient and caregiver  - Arrange for needed discharge resources and transportation as appropriate  - Identify discharge learning needs (meds, wound care, etc )  - Arrange for interpretive services to assist at discharge as needed  - Refer to Case Management Department for coordinating discharge planning if the patient needs post-hospital services based on physician/advanced practitioner order or complex needs related to functional status, cognitive ability, or social support system  Outcome: Progressing     Problem: Knowledge Deficit  Goal: Patient/family/caregiver demonstrates understanding of disease process, treatment plan, medications, and discharge instructions  Description: Complete learning assessment and assess knowledge base    Interventions:  - Provide teaching at level of understanding  - Provide teaching via preferred learning methods  Outcome: Progressing     Problem: Nutrition/Hydration-ADULT  Goal: Nutrient/Hydration intake appropriate for improving, restoring or maintaining nutritional needs  Description: Monitor and assess patient's nutrition/hydration status for malnutrition  Collaborate with interdisciplinary team and initiate plan and interventions as ordered  Monitor patient's weight and dietary intake as ordered or per policy  Utilize nutrition screening tool and intervene as necessary  Determine patient's food preferences and provide high-protein, high-caloric foods as appropriate       INTERVENTIONS:  - Monitor oral intake, urinary output, labs, and treatment plans  - Assess nutrition and hydration status and recommend course of action  - Evaluate amount of meals eaten  - Assist patient with eating if necessary   - Allow adequate time for meals  - Recommend/ encourage appropriate diets, oral nutritional supplements, and vitamin/mineral supplements  - Order, calculate, and assess calorie counts as needed  - Recommend, monitor, and adjust tube feedings and TPN/PPN based on assessed needs  - Assess need for intravenous fluids  - Provide specific nutrition/hydration education as appropriate  - Include patient/family/caregiver in decisions related to nutrition  Outcome: Progressing (2) well flexed

## 2023-10-20 NOTE — ASSESSMENT & PLAN NOTE
Is This A New Presentation, Or A Follow-Up?: Follow Up Isotretinoin · Platelets 82   His recent baseline has been around 100-110  · Was started on B12 last admission-continue this  · Daily CBC and trend platelets  · Monitor for bleeding